# Patient Record
Sex: FEMALE | Race: WHITE | NOT HISPANIC OR LATINO | Employment: OTHER | URBAN - METROPOLITAN AREA
[De-identification: names, ages, dates, MRNs, and addresses within clinical notes are randomized per-mention and may not be internally consistent; named-entity substitution may affect disease eponyms.]

---

## 2017-09-12 ENCOUNTER — OFFICE VISIT (OUTPATIENT)
Dept: CARDIOLOGY | Facility: CLINIC | Age: 82
End: 2017-09-12

## 2017-09-12 VITALS
HEIGHT: 63 IN | DIASTOLIC BLOOD PRESSURE: 70 MMHG | BODY MASS INDEX: 19.7 KG/M2 | SYSTOLIC BLOOD PRESSURE: 130 MMHG | WEIGHT: 111.2 LBS | HEART RATE: 70 BPM

## 2017-09-12 DIAGNOSIS — I10 ESSENTIAL HYPERTENSION: Primary | ICD-10-CM

## 2017-09-12 DIAGNOSIS — R07.9 CHEST PAIN SYNDROME: ICD-10-CM

## 2017-09-12 DIAGNOSIS — I65.23 BILATERAL CAROTID ARTERY STENOSIS: ICD-10-CM

## 2017-09-12 DIAGNOSIS — E78.2 MIXED HYPERLIPIDEMIA: ICD-10-CM

## 2017-09-12 DIAGNOSIS — R94.31 ABNORMAL EKG: ICD-10-CM

## 2017-09-12 PROBLEM — R00.1 BRADYCARDIA ON ECG: Status: ACTIVE | Noted: 2017-09-12

## 2017-09-12 PROCEDURE — 99213 OFFICE O/P EST LOW 20 MIN: CPT | Performed by: NURSE PRACTITIONER

## 2017-09-12 PROCEDURE — 93000 ELECTROCARDIOGRAM COMPLETE: CPT | Performed by: NURSE PRACTITIONER

## 2017-09-12 RX ORDER — DORZOLAMIDE HYDROCHLORIDE AND TIMOLOL MALEATE 20; 5 MG/ML; MG/ML
1 SOLUTION/ DROPS OPHTHALMIC EVERY 12 HOURS
COMMUNITY
Start: 2017-06-09 | End: 2018-06-05 | Stop reason: SDUPTHER

## 2017-09-12 NOTE — ASSESSMENT & PLAN NOTE
· EKG shows ST and T wave abnormality in lateral leads  · Continue aspirin 81 mg daily  · Schedule myocardial perfusion study

## 2017-09-12 NOTE — ASSESSMENT & PLAN NOTE
· Patient currently asymptomatic  · If patient develops symptoms consider decreasing diltiazem to 90 mg daily

## 2017-09-12 NOTE — PROGRESS NOTES
Encounter Date:09/12/2017    Patient ID: Cristiane Harris is a 87 y.o. female who is retired and lives in [place] 6 months out of the year and the other 6 months she resides in [place], [place].    CC/Reason for visit:  Chest pain Syndrome and Hypertension          Cristiane Harris returns today for follow-up for chest pain syndrome, hypertension and hyperlipidemia.  Since her last visit she had a follow-up appointment with her oncologist and they performed a EKG because her heart rate was found to be low with a pulse rate of 48.  The EKG showed her heart rate was 64 but there was some question as to ST changes in the lateral leads and Q waves in the inferior leads and she was instructed to follow-up with her cardiologist.  The patient has not experienced any episodes of chest pain or dyspnea but she does occasionally feel pain in her bilateral jaws as well as a choking sensation in her throat.  This usually occurs only with exertion such as when she is walking.  She is 87 years ago but still lives independently and drives.  She does perform stretching exercises 3 times per week.  She is fairly asymptomatic in regards to her bradycardia and denies weakness, dizziness or increased fatigue.  She did have a heart catheter back in the 1990s that was reportedly normal.  Her last stress test was in 2014 and was negative for ischemia or scar but frequent PACs were noted.  She has known carotid artery disease in his underwent bilateral carotid endarterectomies in the past with Dr. Kendell Willams.  She recently had a repeat carotid ultrasound that showed no hemodynamically significant disease bilaterally.  She denies any signs or symptoms of a TIA or stroke.  She is currently taking statin therapy and tolerating without any reports myalgias.  She reports having blood work with her primary care provider who manages her cholesterol levels.    Review of Systems   HENT:        Throat tightness, jaw discomfort        The  "patient's past medical, social, family history and ROS reviewed in the patient's electronic medical record.    Allergies  Review of patient's allergies indicates no known allergies.    Outpatient Prescriptions Marked as Taking for the 9/12/17 encounter (Office Visit) with EARNEST Alexander   Medication Sig Dispense Refill   • aspirin 81 MG tablet Take  by mouth.     • Calcium Carbonate (CALCIUM 600 PO) Take  by mouth.     • diltiazem CD (CARDIZEM CD) 180 MG 24 hr capsule Take  by mouth daily.     • dorzolamide-timolol (COSOPT) 22.3-6.8 MG/ML ophthalmic solution Daily.     • Glucosamine HCl (GLUCOSAMINE PO) Take  by mouth daily.     • imatinib (GLEEVEC) 400 MG chemo tablet Take 200 mg by mouth daily.     • Multiple Vitamins-Minerals (MULTIVITAMIN PO) Take  by mouth daily.     • omeprazole (PriLOSEC) 20 MG capsule Take 20 mg by mouth Daily.     • pentosan polysulfate (ELMIRON) 100 MG capsule Take  by mouth 2 (two) times a day.     • simvastatin (ZOCOR) 20 MG tablet Take 20 mg by mouth.     • telmisartan (MICARDIS) 40 MG tablet Take 40 mg by mouth Daily.     • TRAMADOL HCL PO Take  by mouth as needed.     • Travoprost (TRAVATAN OP) Apply 1 drop to eye 2 (Two) Times a Day.           Blood pressure 130/70, pulse 70, height 63\" (160 cm), weight 111 lb 3.2 oz (50.4 kg).  Body mass index is 19.7 kg/(m^2).    Physical Exam   Constitutional: She is oriented to person, place, and time. She appears well-developed and well-nourished.   HENT:   Head: Normocephalic and atraumatic.   Eyes: Pupils are equal, round, and reactive to light. No scleral icterus.   Neck: No JVD present. Carotid bruit is not present. No thyromegaly present.   Cardiovascular: Normal rate, regular rhythm, S1 normal and S2 normal.  Exam reveals no gallop.    No murmur heard.  Pulmonary/Chest: Effort normal and breath sounds normal.   Abdominal: Soft. There is no hepatosplenomegaly. There is no tenderness.   Neurological: She is alert and oriented to " person, place, and time.   Skin: Skin is warm and dry. No cyanosis. Nails show no clubbing.   Psychiatric: She has a normal mood and affect. Her behavior is normal.       Data Review:     ECG 12 Lead  Date/Time: 9/12/2017 4:38 PM  Performed by: PREM FERNÁNDEZ  Authorized by: PREM FERNÁNDEZ   Rhythm: sinus bradycardia  BPM: 53  Clinical impression: abnormal ECG  Comments: Sinus bradycardia  ST and T wave abnormality, consider lateral ischemia  MA interval 1:30 MS  QRS duration 98 MS  QT/QTc 432/405 MS                 Problem List Items Addressed This Visit        Cardiovascular and Mediastinum    Essential hypertension - Primary    Current Assessment & Plan     · Hypertension is controlled  · Continue diltiazem 180 mg daily  · Continue Micardis 40 mg daily         Stenosis of carotid artery    Overview     · 2012 Right carotid endarterectomy by Dr Kendell Willams   · 2015 Left carotid endarterectomy by Kendell Lazar  · Bilateral carotid ultrasound 08/28/2017: Right carotid mild luminal irregularities.  Left internal carotid 16-49% stenosis.         Current Assessment & Plan     · Patient asymptomatic  · No significant stenosis noted on recent ultrasound         Mixed hyperlipidemia    Overview     · Follow-up with PCP to have lipids checked  · Titrate statin therapy to achieve LDL less than 100         Current Assessment & Plan     · Continue Zocor 20 mg daily  · Follow-up PCP for routine lipid monitoring            Nervous and Auditory    Chest pain syndrome    Overview     · Cardiac catheterization, 1993, reportedly normal.   · Nuclear stress test at [facility], July 2009 shows no inducible ischemia, normal EF.   · Echocardiogram, 08/18/2014: LVEF (60% to 65%). Impaired LV relaxation. Mild MR. RVSP 29 mmHg.  · Nuclear treadmill exercise stress test, 08/18/2014: Negative for ischemia and scars; LVEF (74%). JUSTIN = -30%. Frequent PACs.         Current Assessment & Plan     · EKG shows ST and T wave  abnormality in lateral leads  · Continue aspirin 81 mg daily  · Schedule myocardial perfusion study         Relevant Orders    Stress Test With Myocardial Perfusion         Mrs. Harris has been experiencing bilateral jaw discomfort along with neck tightness which could be unstable angina.  I will schedule her to undergo a myocardial perfusion study.  She does have bradycardia but appears to be asymptomatic with it.  She developed symptoms we will consider decreasing her diltiazem to 90 mg daily.  She should continue all her current medications.  We will schedule follow-up for 6 months or sooner pending results of her stress test.         · Continue current medications  · Follow-up PCP for titration of statin therapy  · Schedule myocardial perfusion study  · Follow-up 6 months or sooner if needed pending results    Zeny Maza, APRN  9/12/2017

## 2017-10-05 ENCOUNTER — HOSPITAL ENCOUNTER (OUTPATIENT)
Dept: CARDIOLOGY | Facility: HOSPITAL | Age: 82
Discharge: HOME | End: 2017-10-05

## 2017-10-05 ENCOUNTER — HOSPITAL ENCOUNTER (OUTPATIENT)
Dept: CARDIOLOGY | Facility: HOSPITAL | Age: 82
Discharge: HOME | End: 2017-10-05
Admitting: NURSE PRACTITIONER

## 2017-10-05 VITALS
DIASTOLIC BLOOD PRESSURE: 72 MMHG | SYSTOLIC BLOOD PRESSURE: 128 MMHG | WEIGHT: 110 LBS | HEART RATE: 58 BPM | HEIGHT: 63 IN | BODY MASS INDEX: 19.49 KG/M2

## 2017-10-05 DIAGNOSIS — R07.9 CHEST PAIN SYNDROME: ICD-10-CM

## 2017-10-05 LAB
BH CV STRESS BP STAGE 1: NORMAL
BH CV STRESS BP STAGE 2: NORMAL
BH CV STRESS DURATION MIN STAGE 1: 3
BH CV STRESS DURATION MIN STAGE 2: 3
BH CV STRESS DURATION MIN STAGE 3: 1
BH CV STRESS DURATION SEC STAGE 1: 0
BH CV STRESS DURATION SEC STAGE 2: 0
BH CV STRESS DURATION SEC STAGE 3: 37
BH CV STRESS GRADE STAGE 1: 10
BH CV STRESS GRADE STAGE 2: 12
BH CV STRESS GRADE STAGE 3: 14
BH CV STRESS HR STAGE 1: 86
BH CV STRESS HR STAGE 2: 108
BH CV STRESS HR STAGE 3: 117
BH CV STRESS METS STAGE 1: 5
BH CV STRESS METS STAGE 2: 7.5
BH CV STRESS METS STAGE 3: 10
BH CV STRESS O2 STAGE 2: 99
BH CV STRESS PROTOCOL 1: NORMAL
BH CV STRESS RECOVERY BP: NORMAL MMHG
BH CV STRESS RECOVERY HR: 64 BPM
BH CV STRESS SPEED STAGE 1: 1.7
BH CV STRESS SPEED STAGE 2: 2.5
BH CV STRESS SPEED STAGE 3: 3.4
BH CV STRESS STAGE 1: 1
BH CV STRESS STAGE 2: 2
BH CV STRESS STAGE 3: 3
LV EF NUC BP: 75 %
MAXIMAL PREDICTED HEART RATE: 133 BPM
PERCENT MAX PREDICTED HR: 87.97 %
STRESS BASELINE BP: NORMAL MMHG
STRESS BASELINE HR: 59 BPM
STRESS PERCENT HR: 103 %
STRESS POST ESTIMATED WORKLOAD: 7.2 METS
STRESS POST EXERCISE DUR MIN: 7 MIN
STRESS POST EXERCISE DUR SEC: 37 SEC
STRESS POST O2 SAT PEAK: 99 %
STRESS POST PEAK BP: NORMAL MMHG
STRESS POST PEAK HR: 117 BPM
STRESS TARGET HR: 113 BPM

## 2017-10-05 PROCEDURE — 0 TECHNETIUM SESTAMIBI: Performed by: NURSE PRACTITIONER

## 2017-10-05 PROCEDURE — 93017 CV STRESS TEST TRACING ONLY: CPT

## 2017-10-05 PROCEDURE — 93018 CV STRESS TEST I&R ONLY: CPT | Performed by: INTERNAL MEDICINE

## 2017-10-05 PROCEDURE — 78452 HT MUSCLE IMAGE SPECT MULT: CPT

## 2017-10-05 PROCEDURE — 78452 HT MUSCLE IMAGE SPECT MULT: CPT | Performed by: INTERNAL MEDICINE

## 2017-10-05 PROCEDURE — A9500 TC99M SESTAMIBI: HCPCS | Performed by: NURSE PRACTITIONER

## 2017-10-05 RX ADMIN — TECHNETIUM TC-99M SESTAMIBI 1 DOSE: 1 INJECTION INTRAVENOUS at 11:10

## 2017-10-05 RX ADMIN — TECHNETIUM TC-99M SESTAMIBI 1 DOSE: 1 INJECTION INTRAVENOUS at 12:40

## 2018-06-05 ENCOUNTER — OFFICE VISIT (OUTPATIENT)
Dept: CARDIOLOGY | Facility: CLINIC | Age: 83
End: 2018-06-05

## 2018-06-05 VITALS
DIASTOLIC BLOOD PRESSURE: 56 MMHG | HEART RATE: 46 BPM | HEIGHT: 62 IN | SYSTOLIC BLOOD PRESSURE: 136 MMHG | WEIGHT: 109.4 LBS | BODY MASS INDEX: 20.13 KG/M2

## 2018-06-05 DIAGNOSIS — Z01.810 PREOPERATIVE CARDIOVASCULAR EXAMINATION: ICD-10-CM

## 2018-06-05 DIAGNOSIS — I10 ESSENTIAL HYPERTENSION: Primary | ICD-10-CM

## 2018-06-05 DIAGNOSIS — R00.1 BRADYCARDIA ON ECG: ICD-10-CM

## 2018-06-05 DIAGNOSIS — E78.5 HYPERLIPIDEMIA LDL GOAL <100: ICD-10-CM

## 2018-06-05 DIAGNOSIS — I65.23 ATHEROSCLEROSIS OF BOTH CAROTID ARTERIES: ICD-10-CM

## 2018-06-05 PROCEDURE — 93000 ELECTROCARDIOGRAM COMPLETE: CPT | Performed by: INTERNAL MEDICINE

## 2018-06-05 PROCEDURE — 99214 OFFICE O/P EST MOD 30 MIN: CPT | Performed by: INTERNAL MEDICINE

## 2018-06-05 RX ORDER — DORZOLAMIDE HCL 20 MG/ML
1 SOLUTION/ DROPS OPHTHALMIC 2 TIMES DAILY
COMMUNITY
Start: 2018-05-21

## 2018-06-05 RX ORDER — DILTIAZEM HYDROCHLORIDE 120 MG/1
120 CAPSULE, COATED, EXTENDED RELEASE ORAL DAILY
Qty: 90 CAPSULE | Refills: 3 | Status: SHIPPED | OUTPATIENT
Start: 2018-06-05 | End: 2019-05-31

## 2018-06-05 RX ORDER — TRAVOPROST 0.004 %
1 DROPS OPHTHALMIC (EYE) DAILY
COMMUNITY
Start: 2018-05-21

## 2018-06-05 RX ORDER — ESOMEPRAZOLE MAGNESIUM 40 MG/1
40 CAPSULE, DELAYED RELEASE ORAL 2 TIMES DAILY
COMMUNITY
End: 2018-07-07 | Stop reason: HOSPADM

## 2018-06-05 RX ORDER — TIMOLOL MALEATE 5 MG/ML
1 SOLUTION/ DROPS OPHTHALMIC 2 TIMES DAILY
COMMUNITY
Start: 2018-05-21 | End: 2022-09-06

## 2018-06-05 NOTE — ASSESSMENT & PLAN NOTE
The patient is asymptomatic for angina and heart failure symptoms. Her EKG today is normal with the exception of sinus bradycardia, which is been chronic and asymptomatic. She is at acceptable cardiovascular risk to proceed with right total knee arthroplasty.

## 2018-07-02 ENCOUNTER — APPOINTMENT (OUTPATIENT)
Dept: GENERAL RADIOLOGY | Facility: HOSPITAL | Age: 83
End: 2018-07-02

## 2018-07-02 ENCOUNTER — HOSPITAL ENCOUNTER (INPATIENT)
Facility: HOSPITAL | Age: 83
LOS: 5 days | Discharge: HOME HEALTH | End: 2018-07-07
Attending: EMERGENCY MEDICINE | Admitting: INTERNAL MEDICINE

## 2018-07-02 DIAGNOSIS — Z74.09 IMPAIRED MOBILITY AND ADLS: ICD-10-CM

## 2018-07-02 DIAGNOSIS — D64.9 ANEMIA, UNSPECIFIED TYPE: Primary | ICD-10-CM

## 2018-07-02 DIAGNOSIS — R11.2 NAUSEA AND VOMITING, INTRACTABILITY OF VOMITING NOT SPECIFIED, UNSPECIFIED VOMITING TYPE: ICD-10-CM

## 2018-07-02 DIAGNOSIS — K59.39 DILATATION OF COLON: ICD-10-CM

## 2018-07-02 DIAGNOSIS — R19.7 DIARRHEA, UNSPECIFIED TYPE: ICD-10-CM

## 2018-07-02 DIAGNOSIS — K92.2 GASTROINTESTINAL HEMORRHAGE, UNSPECIFIED GASTROINTESTINAL HEMORRHAGE TYPE: ICD-10-CM

## 2018-07-02 DIAGNOSIS — E87.6 HYPOKALEMIA: ICD-10-CM

## 2018-07-02 DIAGNOSIS — Z78.9 IMPAIRED MOBILITY AND ADLS: ICD-10-CM

## 2018-07-02 LAB
ABO GROUP BLD: NORMAL
ALBUMIN SERPL-MCNC: 2.7 G/DL (ref 3.5–5)
ALBUMIN/GLOB SERPL: 1.1 G/DL (ref 1.1–2.5)
ALP SERPL-CCNC: 46 U/L (ref 24–120)
ALT SERPL W P-5'-P-CCNC: 28 U/L (ref 0–54)
ANION GAP SERPL CALCULATED.3IONS-SCNC: 13 MMOL/L (ref 4–13)
AST SERPL-CCNC: 32 U/L (ref 7–45)
BASOPHILS # BLD MANUAL: 0.05 10*3/MM3 (ref 0–0.2)
BASOPHILS NFR BLD AUTO: 1.1 % (ref 0–2)
BILIRUB SERPL-MCNC: 0.2 MG/DL (ref 0.1–1)
BLD GP AB SCN SERPL QL: NEGATIVE
BUN BLD-MCNC: 14 MG/DL (ref 5–21)
BUN/CREAT SERPL: 18.4 (ref 7–25)
CALCIUM SPEC-SCNC: 8.3 MG/DL (ref 8.4–10.4)
CHLORIDE SERPL-SCNC: 104 MMOL/L (ref 98–110)
CO2 SERPL-SCNC: 20 MMOL/L (ref 24–31)
CREAT BLD-MCNC: 0.76 MG/DL (ref 0.5–1.4)
D-LACTATE SERPL-SCNC: 0.9 MMOL/L (ref 0.5–2)
DEPRECATED RDW RBC AUTO: 49 FL (ref 40–54)
DEVELOPER EXPIRATION DATE: ABNORMAL
DEVELOPER LOT NUMBER: 129
ERYTHROCYTE [DISTWIDTH] IN BLOOD BY AUTOMATED COUNT: 14.2 % (ref 12–15)
EXPIRATION DATE: ABNORMAL
FECAL OCCULT BLOOD SCREEN, POC: POSITIVE
GFR SERPL CREATININE-BSD FRML MDRD: 72 ML/MIN/1.73
GIANT PLATELETS: ABNORMAL
GLOBULIN UR ELPH-MCNC: 2.5 GM/DL
GLUCOSE BLD-MCNC: 114 MG/DL (ref 70–100)
HCT VFR BLD AUTO: 22.3 % (ref 37–47)
HGB BLD-MCNC: 7.3 G/DL (ref 12–16)
HYPOCHROMIA BLD QL: ABNORMAL
INR PPP: 1.37 (ref 0.91–1.09)
LIPASE SERPL-CCNC: <10 U/L (ref 23–203)
LYMPHOCYTES # BLD MANUAL: 0.49 10*3/MM3 (ref 0.72–4.86)
LYMPHOCYTES NFR BLD MANUAL: 10.3 % (ref 15–45)
LYMPHOCYTES NFR BLD MANUAL: 8 % (ref 4–12)
Lab: 129
MCH RBC QN AUTO: 31.2 PG (ref 28–32)
MCHC RBC AUTO-ENTMCNC: 32.7 G/DL (ref 33–36)
MCV RBC AUTO: 95.3 FL (ref 82–98)
METAMYELOCYTES NFR BLD MANUAL: 1.1 % (ref 0–0)
MONOCYTES # BLD AUTO: 0.38 10*3/MM3 (ref 0.19–1.3)
MYELOCYTES NFR BLD MANUAL: 2.3 % (ref 0–0)
NEGATIVE CONTROL: NEGATIVE
NEUTROPHILS # BLD AUTO: 3.63 10*3/MM3 (ref 1.87–8.4)
NEUTROPHILS NFR BLD MANUAL: 48.3 % (ref 39–78)
NEUTS BAND NFR BLD MANUAL: 28.7 % (ref 0–10)
PLATELET # BLD AUTO: 328 10*3/MM3 (ref 130–400)
PMV BLD AUTO: 9.6 FL (ref 6–12)
POLYCHROMASIA BLD QL SMEAR: ABNORMAL
POSITIVE CONTROL: POSITIVE
POTASSIUM BLD-SCNC: 2.6 MMOL/L (ref 3.5–5.3)
PROT SERPL-MCNC: 5.2 G/DL (ref 6.3–8.7)
PROTHROMBIN TIME: 17.3 SECONDS (ref 11.9–14.6)
RBC # BLD AUTO: 2.34 10*6/MM3 (ref 4.2–5.4)
RH BLD: NEGATIVE
SODIUM BLD-SCNC: 137 MMOL/L (ref 135–145)
T&S EXPIRATION DATE: NORMAL
WBC MORPH BLD: NORMAL
WBC NRBC COR # BLD: 4.72 10*3/MM3 (ref 4.8–10.8)

## 2018-07-02 PROCEDURE — 85025 COMPLETE CBC W/AUTO DIFF WBC: CPT | Performed by: EMERGENCY MEDICINE

## 2018-07-02 PROCEDURE — 74018 RADEX ABDOMEN 1 VIEW: CPT

## 2018-07-02 PROCEDURE — 25010000002 ONDANSETRON PER 1 MG: Performed by: FAMILY MEDICINE

## 2018-07-02 PROCEDURE — 85007 BL SMEAR W/DIFF WBC COUNT: CPT | Performed by: EMERGENCY MEDICINE

## 2018-07-02 PROCEDURE — 83605 ASSAY OF LACTIC ACID: CPT | Performed by: EMERGENCY MEDICINE

## 2018-07-02 PROCEDURE — 80053 COMPREHEN METABOLIC PANEL: CPT | Performed by: EMERGENCY MEDICINE

## 2018-07-02 PROCEDURE — 25010000003 POTASSIUM CHLORIDE 10 MEQ/100ML SOLUTION: Performed by: EMERGENCY MEDICINE

## 2018-07-02 PROCEDURE — 25010000002 POTASSIUM CHLORIDE PER 2 MEQ: Performed by: FAMILY MEDICINE

## 2018-07-02 PROCEDURE — 87040 BLOOD CULTURE FOR BACTERIA: CPT | Performed by: EMERGENCY MEDICINE

## 2018-07-02 PROCEDURE — 86901 BLOOD TYPING SEROLOGIC RH(D): CPT | Performed by: EMERGENCY MEDICINE

## 2018-07-02 PROCEDURE — 94799 UNLISTED PULMONARY SVC/PX: CPT

## 2018-07-02 PROCEDURE — 86900 BLOOD TYPING SEROLOGIC ABO: CPT | Performed by: EMERGENCY MEDICINE

## 2018-07-02 PROCEDURE — 82270 OCCULT BLOOD FECES: CPT | Performed by: EMERGENCY MEDICINE

## 2018-07-02 PROCEDURE — 83690 ASSAY OF LIPASE: CPT | Performed by: EMERGENCY MEDICINE

## 2018-07-02 PROCEDURE — 85610 PROTHROMBIN TIME: CPT | Performed by: EMERGENCY MEDICINE

## 2018-07-02 PROCEDURE — 99285 EMERGENCY DEPT VISIT HI MDM: CPT

## 2018-07-02 PROCEDURE — 86850 RBC ANTIBODY SCREEN: CPT | Performed by: EMERGENCY MEDICINE

## 2018-07-02 RX ORDER — DILTIAZEM HYDROCHLORIDE 120 MG/1
120 CAPSULE, COATED, EXTENDED RELEASE ORAL DAILY
Status: DISCONTINUED | OUTPATIENT
Start: 2018-07-03 | End: 2018-07-07 | Stop reason: HOSPADM

## 2018-07-02 RX ORDER — ONDANSETRON 2 MG/ML
4 INJECTION INTRAMUSCULAR; INTRAVENOUS EVERY 6 HOURS PRN
Status: DISCONTINUED | OUTPATIENT
Start: 2018-07-02 | End: 2018-07-07 | Stop reason: HOSPADM

## 2018-07-02 RX ORDER — POTASSIUM CHLORIDE 750 MG/1
40 CAPSULE, EXTENDED RELEASE ORAL ONCE
Status: COMPLETED | OUTPATIENT
Start: 2018-07-02 | End: 2018-07-02

## 2018-07-02 RX ORDER — SODIUM CHLORIDE 0.9 % (FLUSH) 0.9 %
1-10 SYRINGE (ML) INJECTION AS NEEDED
Status: DISCONTINUED | OUTPATIENT
Start: 2018-07-02 | End: 2018-07-07 | Stop reason: HOSPADM

## 2018-07-02 RX ORDER — POTASSIUM CHLORIDE 14.9 MG/ML
20 INJECTION INTRAVENOUS ONCE
Status: COMPLETED | OUTPATIENT
Start: 2018-07-02 | End: 2018-07-03

## 2018-07-02 RX ORDER — SODIUM CHLORIDE 9 MG/ML
50 INJECTION, SOLUTION INTRAVENOUS CONTINUOUS
Status: DISCONTINUED | OUTPATIENT
Start: 2018-07-02 | End: 2018-07-04

## 2018-07-02 RX ORDER — POTASSIUM CHLORIDE 7.45 MG/ML
10 INJECTION INTRAVENOUS ONCE
Status: COMPLETED | OUTPATIENT
Start: 2018-07-02 | End: 2018-07-02

## 2018-07-02 RX ORDER — SODIUM CHLORIDE 0.9 % (FLUSH) 0.9 %
10 SYRINGE (ML) INJECTION AS NEEDED
Status: DISCONTINUED | OUTPATIENT
Start: 2018-07-02 | End: 2018-07-07 | Stop reason: HOSPADM

## 2018-07-02 RX ORDER — LOSARTAN POTASSIUM 50 MG/1
50 TABLET ORAL
Status: DISCONTINUED | OUTPATIENT
Start: 2018-07-03 | End: 2018-07-03

## 2018-07-02 RX ORDER — ATORVASTATIN CALCIUM 10 MG/1
10 TABLET, FILM COATED ORAL NIGHTLY
Status: DISCONTINUED | OUTPATIENT
Start: 2018-07-02 | End: 2018-07-07 | Stop reason: HOSPADM

## 2018-07-02 RX ADMIN — POTASSIUM CHLORIDE 10 MEQ: 10 INJECTION, SOLUTION INTRAVENOUS at 18:45

## 2018-07-02 RX ADMIN — SODIUM CHLORIDE 1000 ML: 9 INJECTION, SOLUTION INTRAVENOUS at 18:18

## 2018-07-02 RX ADMIN — ONDANSETRON 4 MG: 2 INJECTION, SOLUTION INTRAMUSCULAR; INTRAVENOUS at 23:35

## 2018-07-02 RX ADMIN — ATORVASTATIN CALCIUM 10 MG: 10 TABLET, FILM COATED ORAL at 22:54

## 2018-07-02 RX ADMIN — POTASSIUM CHLORIDE 20 MEQ: 200 INJECTION, SOLUTION INTRAVENOUS at 22:58

## 2018-07-02 RX ADMIN — POTASSIUM CHLORIDE 40 MEQ: 750 CAPSULE, EXTENDED RELEASE ORAL at 19:47

## 2018-07-02 RX ADMIN — SODIUM CHLORIDE 8 MG/HR: 900 INJECTION INTRAVENOUS at 22:57

## 2018-07-02 RX ADMIN — PENTOSAN POLYSULFATE SODIUM 100 MG: 100 CAPSULE, GELATIN COATED ORAL at 22:54

## 2018-07-02 RX ADMIN — SODIUM CHLORIDE 100 ML/HR: 9 INJECTION, SOLUTION INTRAVENOUS at 21:55

## 2018-07-02 NOTE — ED PROVIDER NOTES
Subjective   Patient is an 88-year-old female who presents with anemia and hypokalemia.  Patient had total knee arthroplasty on June 20 in [place].  Patient has been doing well however is 2 days ago, started having multiple episodes of diarrhea and vomiting.  She notes at least 20 episodes per day of loose stool which has been nonbloody.  No fevers.  She also notes multiple episodes of nonbloody and nonbilious emesis.  Denies any significant abdominal pain.  Denies any fevers.  No previous history of GI bleed.  She has been taking 650 mg of aspirin twice daily since her surgery on June 20.  No other sick family members.  She does have generalized weakness and has been trying Pedialyte at home but does not feel any better.  She had her blood work drawn today and her hemoglobin was 7.4 and potassium was 2.7.  EMS did give her fluids and Zofran with some relief of her nausea.    Family does think she looks slightly more pale than normal but she denies any dizziness, lightheadedness, shortness of breath, syncope, presyncope.  She denies any knee pain.  She has been ambulatory.  No history of potassium problems.            Review of Systems   Constitutional: Negative for chills, diaphoresis, fatigue and fever.   HENT: Negative for sore throat.    Eyes: Negative for visual disturbance.   Respiratory: Negative for shortness of breath.    Cardiovascular: Negative for chest pain.   Gastrointestinal: Positive for diarrhea, nausea and vomiting. Negative for abdominal pain, blood in stool and constipation.   Genitourinary: Negative for dysuria, hematuria and vaginal bleeding.   Musculoskeletal: Negative for back pain.   Skin: Negative for rash.   Neurological: Negative for dizziness, syncope, weakness, light-headedness, numbness and headaches.       Past Medical History:   Diagnosis Date   • Hypertension    • Skin cancer     squamous cell   • Stenosis of carotid artery        No Known Allergies    Past Surgical History:    Procedure Laterality Date   • BREAST SURGERY  2011   • CAROTID ENDARTERECTOMY Left    • OTHER SURGICAL HISTORY      Facial Surgery - Skin cancer removed   • THROMBOENDARTERECTOMY Right 11/20/2012    carotid   • TUMOR REMOVAL      GASTROINTESTINAL STOMA TUMOR       Family History   Problem Relation Age of Onset   • Cancer Mother    • Aneurysm Father         of abdominal aorta   • Coronary artery disease Father    • Diabetes Father    • Cancer Sister        Social History     Social History   • Marital status:      Social History Main Topics   • Smoking status: Never Smoker   • Smokeless tobacco: Never Used   • Alcohol use No   • Drug use: No   • Sexual activity: Defer     Other Topics Concern   • Not on file       Lab Results (last 24 hours)     Procedure Component Value Units Date/Time    CBC & Differential [[id number]] Collected:  07/02/18 1833    Specimen:  Blood Updated:  07/02/18 1921    Narrative:       The following orders were created for panel order CBC & Differential.  Procedure                               Abnormality         Status                     ---------                               -----------         ------                     Manual Differential[[id number]]          Abnormal            Final result               CBC Auto Differential[[id number]]        Abnormal            Final result                 Please view results for these tests on the individual orders.    Comprehensive Metabolic Panel [[id number]]  (Abnormal) Collected:  07/02/18 1833    Specimen:  Blood Updated:  07/02/18 1901     Glucose 114 (H) mg/dL      BUN 14 mg/dL      Creatinine 0.76 mg/dL      Sodium 137 mmol/L      Potassium 2.6 (C) mmol/L      Chloride 104 mmol/L      CO2 20.0 (L) mmol/L      Calcium 8.3 (L) mg/dL      Total Protein 5.2 (L) g/dL      Albumin 2.70 (L) g/dL      ALT (SGPT) 28 U/L      AST (SGOT) 32 U/L      Alkaline Phosphatase 46 U/L      Total Bilirubin 0.2 mg/dL      eGFR Non African Amer 72  mL/min/1.73      Globulin 2.5 gm/dL      A/G Ratio 1.1 g/dL      BUN/Creatinine Ratio 18.4     Anion Gap 13.0 mmol/L     Narrative:       The MDRD GFR formula is only valid for adults with stable renal function between ages 18 and 70.    Protime-INR [[id number]]  (Abnormal) Collected:  07/02/18 1833    Specimen:  Blood Updated:  07/02/18 1856     Protime 17.3 (H) Seconds      INR 1.37 (H)    Lactic Acid, Plasma [[id number]]  (Normal) Collected:  07/02/18 1833    Specimen:  Blood Updated:  07/02/18 1900     Lactate 0.9 mmol/L     Blood Culture - Blood, [[id number]] Collected:  07/02/18 1833    Specimen:  Blood from Arm, Left Updated:  07/02/18 1902    Lipase [[id number]]  (Abnormal) Collected:  07/02/18 1833    Specimen:  Blood Updated:  07/02/18 1859     Lipase <10 (L) U/L     CBC Auto Differential [[id number]]  (Abnormal) Collected:  07/02/18 1833    Specimen:  Blood Updated:  07/02/18 1921     WBC 4.72 (L) 10*3/mm3      RBC 2.34 (L) 10*6/mm3      Hemoglobin 7.3 (L) g/dL      Hematocrit 22.3 (L) %      MCV 95.3 fL      MCH 31.2 pg      MCHC 32.7 (L) g/dL      RDW 14.2 %      RDW-SD 49.0 fl      MPV 9.6 fL      Platelets 328 10*3/mm3     Manual Differential [[id number]]  (Abnormal) Collected:  07/02/18 1833    Specimen:  Blood Updated:  07/02/18 1921     Neutrophil % 48.3 %      Lymphocyte % 10.3 (L) %      Monocyte % 8.0 %      Basophil % 1.1 %      Bands %  28.7 (H) %      Metamyelocyte % 1.1 (H) %      Myelocyte % 2.3 (H) %      Neutrophils Absolute 3.63 10*3/mm3      Lymphocytes Absolute 0.49 (L) 10*3/mm3      Monocytes Absolute 0.38 10*3/mm3      Basophils Absolute 0.05 10*3/mm3      Hypochromia Slight/1+     Polychromasia Slight/1+     WBC Morphology Normal     Giant Platelets Mod/2+          Objective   Physical Exam   Constitutional: She is oriented to person, place, and time. She appears well-nourished. No distress.   Temperature is 100.1   HENT:   Head: Atraumatic.   Mouth/Throat: Oropharynx is clear and moist  "and mucous membranes are normal.   Eyes: Conjunctivae and EOM are normal. Pupils are equal, round, and reactive to light. No scleral icterus.   Neck: Normal range of motion. Neck supple.   Cardiovascular: Normal rate, regular rhythm, normal heart sounds and intact distal pulses.  Exam reveals no gallop and no friction rub.    No murmur heard.  Pulmonary/Chest: Effort normal and breath sounds normal. No respiratory distress.   Abdominal: Soft. She exhibits no distension. There is no tenderness. There is no rebound and no guarding.   Genitourinary: Rectal exam shows external hemorrhoid and guaiac positive stool. Rectal exam shows no internal hemorrhoid, no fissure, no mass, no tenderness and anal tone normal.   Genitourinary Comments: No blood noted on finger.  Brown stool noted.  Hemoccult was very faintly positive   Musculoskeletal: She exhibits no edema, tenderness or deformity.   Patient does have healing incision of her right knee.  No erythema, purulence, tenderness.  Normal range of motion.   Neurological: She is alert and oriented to person, place, and time. No cranial nerve deficit.   Skin: Skin is warm and dry. Capillary refill takes less than 2 seconds. No pallor.   Psychiatric: She has a normal mood and affect.   Nursing note and vitals reviewed.      Procedures         XR Abdomen KUB   Final Result   1. No radiographic evidence of acute abdominopelvic process. Bowel gas   pattern is nonobstructive.   2. Moderate colonic stool with mild dilatation of the ascending colon up   to 6.3 cm.           This report was finalized on 07/02/2018 18:13 by Dr Sandip Fox, .          /59   Pulse 97   Temp 100.1 °F (37.8 °C) (Temporal Artery )   Resp 20   Ht 157.5 cm (62\")   Wt 51.2 kg (112 lb 14.4 oz)   SpO2 100%   BMI 20.65 kg/m²     ED Course    ED Course as of Jul 02 1951 Mon Jul 02, 2018 1915 Potassium: (!!) 2.6 [TH]   1915 CO2: (!) 20.0 [TH]   1915 Calcium: (!) 8.3 [TH]   1915 Albumin: (!) 2.70 " [TH]   1924 Hemoglobin: (!) 7.3 [TH]   1933 This is an 88-year-old female who presents in setting of nausea, vomiting, diarrhea, anemia, hypokalemia.  Recent right knee arthroplasty.  Patient has had multiple episodes of vomiting and diarrhea over 2 days.  Also noted acute anemia with hemoglobin 7.3.  Previous hemoglobin was 9 two years ago.   [TH]   1938 Potassium: (!!) 2.6 [TH]   1938 WBC: (!) 4.72 [TH]   1938 RBC: (!) 2.34 [TH]   1938 Hemoglobin: (!) 7.3 [TH]   1938 Hematocrit: (!) 22.3 [TH]   1938 Her abdominal x-ray does show moderate stool with colonic dilatation up to 6.3 cm.  [TH]   1946 Her rectal exam was faintly positive but no obvious blood on finger.  We have repleted her potassium.  She is currently stable.  We will admit to the hospitalist for further care given lab abnormalities for further hydration, serial H&H, electrolyte replacement, and further workup.  [TH]      ED Course User Index  [TH] Nicko Seth MD       Medications   sodium chloride 0.9 % flush 10 mL (not administered)   potassium chloride 10 mEq in 100 mL IVPB (0 mEq Intravenous Stopped 7/2/18 1922)   sodium chloride 0.9 % bolus 1,000 mL (1,000 mL Intravenous New Bag 7/2/18 1818)   potassium chloride (MICRO-K) CR capsule 40 mEq (40 mEq Oral Given 7/2/18 1947)            MDM  Number of Diagnoses or Management Options  Anemia, unspecified type: new and requires workup  Diarrhea, unspecified type: new and requires workup  Dilatation of colon: new and requires workup  Gastrointestinal hemorrhage, unspecified gastrointestinal hemorrhage type: new and requires workup  Hypokalemia: new and requires workup  Nausea and vomiting, intractability of vomiting not specified, unspecified vomiting type: new and requires workup     Amount and/or Complexity of Data Reviewed  Clinical lab tests: reviewed and ordered  Tests in the radiology section of CPT®: reviewed and ordered  Decide to obtain previous medical records or to obtain history from  someone other than the patient: yes  Review and summarize past medical records: yes    Risk of Complications, Morbidity, and/or Mortality  Presenting problems: moderate  Diagnostic procedures: moderate  Management options: moderate    Patient Progress  Patient progress: stable      Final diagnoses:   Anemia, unspecified type   Gastrointestinal hemorrhage, unspecified gastrointestinal hemorrhage type   Hypokalemia   Diarrhea, unspecified type   Nausea and vomiting, intractability of vomiting not specified, unspecified vomiting type   Dilatation of colon          Nicko Seth MD  07/02/18 1951

## 2018-07-03 ENCOUNTER — ANESTHESIA (OUTPATIENT)
Dept: GASTROENTEROLOGY | Facility: HOSPITAL | Age: 83
End: 2018-07-03

## 2018-07-03 ENCOUNTER — ANESTHESIA EVENT (OUTPATIENT)
Dept: GASTROENTEROLOGY | Facility: HOSPITAL | Age: 83
End: 2018-07-03

## 2018-07-03 ENCOUNTER — APPOINTMENT (OUTPATIENT)
Dept: GENERAL RADIOLOGY | Facility: HOSPITAL | Age: 83
End: 2018-07-03

## 2018-07-03 PROBLEM — R11.2 NAUSEA AND VOMITING: Status: ACTIVE | Noted: 2018-07-02

## 2018-07-03 LAB
ADV 40+41 DNA STL QL NAA+NON-PROBE: NOT DETECTED
ALBUMIN SERPL-MCNC: 2.7 G/DL (ref 3.5–5)
ALBUMIN/GLOB SERPL: 1.1 G/DL (ref 1.1–2.5)
ALP SERPL-CCNC: 44 U/L (ref 24–120)
ALT SERPL W P-5'-P-CCNC: 32 U/L (ref 0–54)
ANION GAP SERPL CALCULATED.3IONS-SCNC: 10 MMOL/L (ref 4–13)
ANISOCYTOSIS BLD QL: ABNORMAL
AST SERPL-CCNC: 35 U/L (ref 7–45)
ASTRO TYP 1-8 RNA STL QL NAA+NON-PROBE: NOT DETECTED
BACTERIA UR QL AUTO: ABNORMAL /HPF
BILIRUB SERPL-MCNC: 0.2 MG/DL (ref 0.1–1)
BILIRUB UR QL STRIP: NEGATIVE
BUN BLD-MCNC: 14 MG/DL (ref 5–21)
BUN/CREAT SERPL: 18.4 (ref 7–25)
C CAYETANENSIS DNA STL QL NAA+NON-PROBE: NOT DETECTED
C DIFF TOX GENS STL QL NAA+PROBE: DETECTED
CALCIUM SPEC-SCNC: 8 MG/DL (ref 8.4–10.4)
CAMPY SP DNA.DIARRHEA STL QL NAA+PROBE: NOT DETECTED
CHLORIDE SERPL-SCNC: 106 MMOL/L (ref 98–110)
CLARITY UR: CLEAR
CO2 SERPL-SCNC: 20 MMOL/L (ref 24–31)
COLOR UR: YELLOW
CREAT BLD-MCNC: 0.76 MG/DL (ref 0.5–1.4)
CRYPTOSP STL CULT: NOT DETECTED
DEPRECATED RDW RBC AUTO: 49.9 FL (ref 40–54)
E COLI DNA SPEC QL NAA+PROBE: NOT DETECTED
E HISTOLYT AG STL-ACNC: NOT DETECTED
EAEC PAA PLAS AGGR+AATA ST NAA+NON-PRB: NOT DETECTED
EC STX1 + STX2 GENES STL NAA+PROBE: NOT DETECTED
EPEC EAE GENE STL QL NAA+NON-PROBE: DETECTED
ERYTHROCYTE [DISTWIDTH] IN BLOOD BY AUTOMATED COUNT: 14.3 % (ref 12–15)
ETEC LTA+ST1A+ST1B TOX ST NAA+NON-PROBE: NOT DETECTED
FERRITIN SERPL-MCNC: 47.9 NG/ML (ref 11.1–264)
FOLATE SERPL-MCNC: >20 NG/ML
G LAMBLIA DNA SPEC QL NAA+PROBE: NOT DETECTED
GFR SERPL CREATININE-BSD FRML MDRD: 72 ML/MIN/1.73
GLOBULIN UR ELPH-MCNC: 2.4 GM/DL
GLUCOSE BLD-MCNC: 110 MG/DL (ref 70–100)
GLUCOSE UR STRIP-MCNC: NEGATIVE MG/DL
HCT VFR BLD AUTO: 21.3 % (ref 37–47)
HCT VFR BLD AUTO: 22 % (ref 37–47)
HCT VFR BLD AUTO: 22.2 % (ref 37–47)
HCT VFR BLD AUTO: 22.5 % (ref 37–47)
HGB BLD-MCNC: 7 G/DL (ref 12–16)
HGB BLD-MCNC: 7.1 G/DL (ref 12–16)
HGB UR QL STRIP.AUTO: NEGATIVE
HYALINE CASTS UR QL AUTO: ABNORMAL /LPF
IRON 24H UR-MRATE: <10 MCG/DL (ref 42–180)
IRON SATN MFR SERPL: ABNORMAL % (ref 20–45)
KETONES UR QL STRIP: ABNORMAL
LEUKOCYTE ESTERASE UR QL STRIP.AUTO: ABNORMAL
LYMPHOCYTES # BLD MANUAL: 0.74 10*3/MM3 (ref 0.72–4.86)
LYMPHOCYTES NFR BLD MANUAL: 18 % (ref 15–45)
LYMPHOCYTES NFR BLD MANUAL: 7 % (ref 4–12)
MAGNESIUM SERPL-MCNC: 1.7 MG/DL (ref 1.4–2.2)
MCH RBC QN AUTO: 32.1 PG (ref 28–32)
MCHC RBC AUTO-ENTMCNC: 33.3 G/DL (ref 33–36)
MCV RBC AUTO: 96.4 FL (ref 82–98)
MONOCYTES # BLD AUTO: 0.29 10*3/MM3 (ref 0.19–1.3)
NEUTROPHILS # BLD AUTO: 3.08 10*3/MM3 (ref 1.87–8.4)
NEUTROPHILS NFR BLD MANUAL: 49 % (ref 39–78)
NEUTS BAND NFR BLD MANUAL: 26 % (ref 0–10)
NITRITE UR QL STRIP: NEGATIVE
NOROVIRUS GI+II RNA STL QL NAA+NON-PROBE: NOT DETECTED
P SHIGELLOIDES DNA STL QL NAA+NON-PROBE: NOT DETECTED
PH UR STRIP.AUTO: <=5 [PH] (ref 5–8)
PHOSPHATE SERPL-MCNC: 2.5 MG/DL (ref 2.5–4.5)
PLAT MORPH BLD: NORMAL
PLATELET # BLD AUTO: 308 10*3/MM3 (ref 130–400)
PMV BLD AUTO: 9.7 FL (ref 6–12)
POIKILOCYTOSIS BLD QL SMEAR: ABNORMAL
POTASSIUM BLD-SCNC: 3.4 MMOL/L (ref 3.5–5.3)
PROT SERPL-MCNC: 5.1 G/DL (ref 6.3–8.7)
PROT UR QL STRIP: ABNORMAL
RBC # BLD AUTO: 2.21 10*6/MM3 (ref 4.2–5.4)
RBC # UR: ABNORMAL /HPF
REF LAB TEST METHOD: ABNORMAL
RV RNA STL NAA+PROBE: NOT DETECTED
SALMONELLA DNA SPEC QL NAA+PROBE: DETECTED
SAPO I+II+IV+V RNA STL QL NAA+NON-PROBE: NOT DETECTED
SHIGELLA SP+EIEC IPAH STL QL NAA+PROBE: NOT DETECTED
SODIUM BLD-SCNC: 136 MMOL/L (ref 135–145)
SP GR UR STRIP: 1.02 (ref 1–1.03)
SQUAMOUS #/AREA URNS HPF: ABNORMAL /HPF
TIBC SERPL-MCNC: 293 MCG/DL (ref 225–420)
UROBILINOGEN UR QL STRIP: ABNORMAL
V CHOLERAE DNA SPEC QL NAA+PROBE: NOT DETECTED
VIBRIO DNA SPEC NAA+PROBE: NOT DETECTED
VIT B12 BLD-MCNC: 995 PG/ML (ref 239–931)
WBC MORPH BLD: NORMAL
WBC NRBC COR # BLD: 4.11 10*3/MM3 (ref 4.8–10.8)
WBC UR QL AUTO: ABNORMAL /HPF
YERSINIA STL CULT: NOT DETECTED

## 2018-07-03 PROCEDURE — 93010 ELECTROCARDIOGRAM REPORT: CPT | Performed by: INTERNAL MEDICINE

## 2018-07-03 PROCEDURE — 85014 HEMATOCRIT: CPT | Performed by: NURSE PRACTITIONER

## 2018-07-03 PROCEDURE — 82728 ASSAY OF FERRITIN: CPT | Performed by: NURSE PRACTITIONER

## 2018-07-03 PROCEDURE — 87507 IADNA-DNA/RNA PROBE TQ 12-25: CPT | Performed by: NURSE PRACTITIONER

## 2018-07-03 PROCEDURE — G8989 SELF CARE D/C STATUS: HCPCS

## 2018-07-03 PROCEDURE — 82607 VITAMIN B-12: CPT | Performed by: NURSE PRACTITIONER

## 2018-07-03 PROCEDURE — 43239 EGD BIOPSY SINGLE/MULTIPLE: CPT | Performed by: INTERNAL MEDICINE

## 2018-07-03 PROCEDURE — 81001 URINALYSIS AUTO W/SCOPE: CPT | Performed by: FAMILY MEDICINE

## 2018-07-03 PROCEDURE — 85025 COMPLETE CBC W/AUTO DIFF WBC: CPT | Performed by: FAMILY MEDICINE

## 2018-07-03 PROCEDURE — 25010000002 PROPOFOL 10 MG/ML EMULSION: Performed by: NURSE ANESTHETIST, CERTIFIED REGISTERED

## 2018-07-03 PROCEDURE — 82746 ASSAY OF FOLIC ACID SERUM: CPT | Performed by: NURSE PRACTITIONER

## 2018-07-03 PROCEDURE — 71045 X-RAY EXAM CHEST 1 VIEW: CPT

## 2018-07-03 PROCEDURE — 85014 HEMATOCRIT: CPT | Performed by: FAMILY MEDICINE

## 2018-07-03 PROCEDURE — 87081 CULTURE SCREEN ONLY: CPT | Performed by: NURSE PRACTITIONER

## 2018-07-03 PROCEDURE — 0W3P8ZZ CONTROL BLEEDING IN GASTROINTESTINAL TRACT, VIA NATURAL OR ARTIFICIAL OPENING ENDOSCOPIC: ICD-10-PCS | Performed by: INTERNAL MEDICINE

## 2018-07-03 PROCEDURE — 97165 OT EVAL LOW COMPLEX 30 MIN: CPT

## 2018-07-03 PROCEDURE — 99222 1ST HOSP IP/OBS MODERATE 55: CPT | Performed by: INTERNAL MEDICINE

## 2018-07-03 PROCEDURE — 85018 HEMOGLOBIN: CPT | Performed by: FAMILY MEDICINE

## 2018-07-03 PROCEDURE — 85018 HEMOGLOBIN: CPT | Performed by: NURSE PRACTITIONER

## 2018-07-03 PROCEDURE — 80053 COMPREHEN METABOLIC PANEL: CPT | Performed by: FAMILY MEDICINE

## 2018-07-03 PROCEDURE — 88305 TISSUE EXAM BY PATHOLOGIST: CPT | Performed by: INTERNAL MEDICINE

## 2018-07-03 PROCEDURE — 83735 ASSAY OF MAGNESIUM: CPT | Performed by: FAMILY MEDICINE

## 2018-07-03 PROCEDURE — 83550 IRON BINDING TEST: CPT | Performed by: NURSE PRACTITIONER

## 2018-07-03 PROCEDURE — 94799 UNLISTED PULMONARY SVC/PX: CPT

## 2018-07-03 PROCEDURE — G8987 SELF CARE CURRENT STATUS: HCPCS

## 2018-07-03 PROCEDURE — 25010000002 POTASSIUM CHLORIDE PER 2 MEQ: Performed by: NURSE PRACTITIONER

## 2018-07-03 PROCEDURE — 93005 ELECTROCARDIOGRAM TRACING: CPT | Performed by: FAMILY MEDICINE

## 2018-07-03 PROCEDURE — 87077 CULTURE AEROBIC IDENTIFY: CPT | Performed by: NURSE PRACTITIONER

## 2018-07-03 PROCEDURE — 25010000002 IRON SUCROSE PER 1 MG: Performed by: NURSE PRACTITIONER

## 2018-07-03 PROCEDURE — 0DB68ZX EXCISION OF STOMACH, VIA NATURAL OR ARTIFICIAL OPENING ENDOSCOPIC, DIAGNOSTIC: ICD-10-PCS | Performed by: INTERNAL MEDICINE

## 2018-07-03 PROCEDURE — G8988 SELF CARE GOAL STATUS: HCPCS

## 2018-07-03 PROCEDURE — 25010000002 PHENYLEPHRINE PER 1 ML: Performed by: NURSE ANESTHETIST, CERTIFIED REGISTERED

## 2018-07-03 PROCEDURE — 84100 ASSAY OF PHOSPHORUS: CPT | Performed by: FAMILY MEDICINE

## 2018-07-03 PROCEDURE — 85007 BL SMEAR W/DIFF WBC COUNT: CPT | Performed by: FAMILY MEDICINE

## 2018-07-03 PROCEDURE — 83540 ASSAY OF IRON: CPT | Performed by: NURSE PRACTITIONER

## 2018-07-03 RX ORDER — LIDOCAINE HYDROCHLORIDE 20 MG/ML
INJECTION, SOLUTION INFILTRATION; PERINEURAL AS NEEDED
Status: DISCONTINUED | OUTPATIENT
Start: 2018-07-03 | End: 2018-07-03 | Stop reason: SURG

## 2018-07-03 RX ORDER — TIMOLOL MALEATE 5 MG/ML
1 SOLUTION/ DROPS OPHTHALMIC EVERY 12 HOURS SCHEDULED
Status: DISCONTINUED | OUTPATIENT
Start: 2018-07-03 | End: 2018-07-07 | Stop reason: HOSPADM

## 2018-07-03 RX ORDER — PANTOPRAZOLE SODIUM 40 MG/1
40 TABLET, DELAYED RELEASE ORAL
Status: DISCONTINUED | OUTPATIENT
Start: 2018-07-04 | End: 2018-07-07 | Stop reason: HOSPADM

## 2018-07-03 RX ORDER — DORZOLAMIDE HCL 20 MG/ML
1 SOLUTION/ DROPS OPHTHALMIC 2 TIMES DAILY
Status: DISCONTINUED | OUTPATIENT
Start: 2018-07-03 | End: 2018-07-07 | Stop reason: HOSPADM

## 2018-07-03 RX ORDER — SODIUM CHLORIDE 9 MG/ML
100 INJECTION, SOLUTION INTRAVENOUS CONTINUOUS
Status: DISCONTINUED | OUTPATIENT
Start: 2018-07-03 | End: 2018-07-03 | Stop reason: HOSPADM

## 2018-07-03 RX ORDER — SODIUM CHLORIDE 0.9 % (FLUSH) 0.9 %
1-10 SYRINGE (ML) INJECTION AS NEEDED
Status: DISCONTINUED | OUTPATIENT
Start: 2018-07-03 | End: 2018-07-03 | Stop reason: HOSPADM

## 2018-07-03 RX ORDER — PROPOFOL 10 MG/ML
VIAL (ML) INTRAVENOUS AS NEEDED
Status: DISCONTINUED | OUTPATIENT
Start: 2018-07-03 | End: 2018-07-03 | Stop reason: SURG

## 2018-07-03 RX ORDER — LATANOPROST 50 UG/ML
1 SOLUTION/ DROPS OPHTHALMIC NIGHTLY
Status: DISCONTINUED | OUTPATIENT
Start: 2018-07-03 | End: 2018-07-07 | Stop reason: HOSPADM

## 2018-07-03 RX ORDER — POTASSIUM CHLORIDE 14.9 MG/ML
20 INJECTION INTRAVENOUS ONCE
Status: COMPLETED | OUTPATIENT
Start: 2018-07-03 | End: 2018-07-03

## 2018-07-03 RX ADMIN — POTASSIUM CHLORIDE 20 MEQ: 200 INJECTION, SOLUTION INTRAVENOUS at 08:33

## 2018-07-03 RX ADMIN — DILTIAZEM HYDROCHLORIDE 120 MG: 120 CAPSULE, COATED, EXTENDED RELEASE ORAL at 08:34

## 2018-07-03 RX ADMIN — ATORVASTATIN CALCIUM 10 MG: 10 TABLET, FILM COATED ORAL at 20:43

## 2018-07-03 RX ADMIN — SODIUM CHLORIDE 8 MG/HR: 900 INJECTION INTRAVENOUS at 08:33

## 2018-07-03 RX ADMIN — SODIUM CHLORIDE 50 ML/HR: 9 INJECTION, SOLUTION INTRAVENOUS at 13:54

## 2018-07-03 RX ADMIN — IRON SUCROSE 300 MG: 20 INJECTION, SOLUTION INTRAVENOUS at 13:36

## 2018-07-03 RX ADMIN — VANCOMYCIN HYDROCHLORIDE 125 MG: KIT at 15:03

## 2018-07-03 RX ADMIN — LOSARTAN POTASSIUM 50 MG: 50 TABLET, FILM COATED ORAL at 08:34

## 2018-07-03 RX ADMIN — DORZOLAMIDE HYDROCHLORIDE 1 DROP: 20 SOLUTION OPHTHALMIC at 20:43

## 2018-07-03 RX ADMIN — PHENYLEPHRINE HYDROCHLORIDE 160 MCG: 10 INJECTION INTRAVENOUS at 12:31

## 2018-07-03 RX ADMIN — PROPOFOL 50 MG: 10 INJECTION, EMULSION INTRAVENOUS at 12:26

## 2018-07-03 RX ADMIN — LATANOPROST 1 DROP: 50 SOLUTION OPHTHALMIC at 20:44

## 2018-07-03 RX ADMIN — TIMOLOL MALEATE 1 DROP: 5 SOLUTION/ DROPS OPHTHALMIC at 20:43

## 2018-07-03 RX ADMIN — SODIUM CHLORIDE 8 MG/HR: 900 INJECTION INTRAVENOUS at 03:10

## 2018-07-03 RX ADMIN — TIMOLOL MALEATE 1 DROP: 5 SOLUTION/ DROPS OPHTHALMIC at 13:36

## 2018-07-03 RX ADMIN — PENTOSAN POLYSULFATE SODIUM 100 MG: 100 CAPSULE, GELATIN COATED ORAL at 16:48

## 2018-07-03 RX ADMIN — VANCOMYCIN HYDROCHLORIDE 250 MG: KIT at 20:43

## 2018-07-03 RX ADMIN — DORZOLAMIDE HYDROCHLORIDE 1 DROP: 20 SOLUTION OPHTHALMIC at 08:35

## 2018-07-03 RX ADMIN — PENTOSAN POLYSULFATE SODIUM 100 MG: 100 CAPSULE, GELATIN COATED ORAL at 06:41

## 2018-07-03 RX ADMIN — LIDOCAINE HYDROCHLORIDE 50 MG: 20 INJECTION, SOLUTION INFILTRATION; PERINEURAL at 12:26

## 2018-07-03 NOTE — H&P (VIEW-ONLY)
[facility] Gastroenterology  Inpatient Consult Note  Today's date:  07/03/18    Cristiane Harris  11/13/1929       Referring Provider: Mayela Hartmann MD  Primary Physician: Julius Daniel MD   Primary Gastroenterologist: Unknown    Date of Admission: 7/2/2018  Date of Service:  07/03/18    Reason for Consultation/Chief Complaint: Diarrhea/anemia    History of present illness: This is a very pleasant 88-year-old female who tells me today that on Saturday she began to have diarrhea.  She states that this continued all day and into Sunday until yesterday she came to the hospital.  She states that she really had no abdominal pain with this.  She states that she had 102 fever at home and her son is at bedside in agrees with this.  The patient and her son state that she was having approximately 25 stools a day.  She tells me that today it has decreased some.  Of note the patient tells me that she had a UTI in June and was treated with this with an antibiotic however she does not remember the name of it.  She states that she took this for about 7 days and then she underwent knee surgery in [place] where her son states that she was given IV antibiotics at that time as well.  She states she was given 650 mg of aspirin twice a day after surgery and has continued to take it since that time.  She tells me that she has had a history of chronic anemia.    The patient denies any nausea, vomiting, epigastric pain, dysphagia, pyrosis or hematemesis.  The patient denies any fever or chills.  Denies any melena or hematochezia.  Denies any unintentional weight loss or loss of appetite.    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  According to records from [facility] in care everywhere the patient had a colonoscopy in 2011 and 2012.  The patient tells me that she has had a history of colon polyps.  She also tells me that her father had colon cancer greater than  60 years old.  The patient's last EGD was performed on 7/25/14 for chronic anemia.  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  The patient underwent Whipple per Dr. Stewart Merchant in [facility]. The patient tells me that she was told this was nonmalignant however she states that she has been treated with Gleevac since that time.    Labs and imaging: CMP on admission revealed hyperglycemia, hypokalemia and hypocalcemia, total protein 5.2, 2.70.  INR 1.37.  CBC reveals a hemoglobin of 7.3.  Hemoglobin today 7.1.  Stools positive for occult blood.  Iron profile reveals iron of less than 10, ferritin 47.9, total iron-binding capacity 293.  Percent saturation lab unable to calculate.  KUB revealed no acute abdominal pelvic process.  Moderate colonic stool with mild dilation of ascending colon up to 6.3 cm.    Past Medical History:   Diagnosis Date   • Anemia    • Arthritis    • GERD (gastroesophageal reflux disease)    • GIST (gastrointestinal stromal tumor), non-malignant    • Hyperlipemia    • Hypertension    • Skin cancer     squamous cell   • Stenosis of carotid artery        Past Surgical History:   Procedure Laterality Date   • BREAST SURGERY  2011   • CAROTID ENDARTERECTOMY Left    • CATARACT EXTRACTION W/ INTRAOCULAR LENS IMPLANT     • JOINT REPLACEMENT     • OTHER SURGICAL HISTORY      Facial Surgery - Skin cancer removed   • THROMBOENDARTERECTOMY Right 11/20/2012    carotid   • TOTAL KNEE ARTHROPLASTY Right    • TUMOR REMOVAL      GASTROINTESTINAL STOMA TUMOR   • WHIPPLE PROCEDURE          No Known Allergies    Prescriptions Prior to Admission   Medication Sig Dispense Refill Last Dose   • aspirin 81 MG tablet Take 81 mg by mouth Daily.   Taking   • diltiaZEM CD (CARDIZEM CD) 120 MG 24 hr capsule Take 1 capsule by mouth Daily for 360 days. 90 capsule 3    • dorzolamide (TRUSOPT) 2 % ophthalmic solution Administer  1 drop to both eyes 2 (Two) Times a Day.   Taking   • esomeprazole (nexIUM) 40 MG capsule Take 40 mg by mouth 2 (Two) Times a Day.   Taking   • Glucosamine HCl (GLUCOSAMINE PO) Take 1 tablet by mouth Daily.   Taking   • imatinib (GLEEVEC) 400 MG chemo tablet Take 200 mg by mouth daily.   Taking   • Multiple Vitamins-Minerals (MULTIVITAMIN PO) Take 1 tablet by mouth Daily.   Taking   • omeprazole (PriLOSEC) 20 MG capsule Take 20 mg by mouth Daily.   Taking   • pentosan polysulfate (ELMIRON) 100 MG capsule Take  by mouth 2 (two) times a day.   Taking   • simvastatin (ZOCOR) 20 MG tablet Take 20 mg by mouth Every Night.   Taking   • telmisartan (MICARDIS) 40 MG tablet Take 40 mg by mouth Daily.   Taking   • timolol (TIMOPTIC) 0.5 % ophthalmic solution Administer 1 drop to both eyes 2 (Two) Times a Day.   Taking   • TRAMADOL HCL PO Take 1 tablet by mouth As Needed.   Taking   • TRAVATAN Z 0.004 % solution ophthalmic solution Administer 1 drop to both eyes Daily.   Taking       Hospital Medications (active)       Dose Frequency Start End    atorvastatin (LIPITOR) tablet 10 mg 10 mg Nightly 7/2/2018     Sig - Route: Take 1 tablet by mouth Every Night. - Oral    diltiaZEM CD (CARDIZEM CD) 24 hr capsule 120 mg 120 mg Daily 7/3/2018 6/1/2019    Sig - Route: Take 1 capsule by mouth Daily. - Oral    dorzolamide (TRUSOPT) 2 % ophthalmic solution 1 drop 1 drop 2 Times Daily 7/3/2018     Sig - Route: Administer 1 drop to both eyes 2 (Two) Times a Day. - Both Eyes    iron sucrose (VENOFER) 300 mg in sodium chloride 0.9 % 100 mL IVPB 300 mg Every 24 Hours 7/3/2018 7/6/2018    Sig - Route: Infuse 300 mg into a venous catheter Daily. - Intravenous    latanoprost (XALATAN) 0.005 % ophthalmic solution 1 drop 1 drop Nightly 7/3/2018     Sig - Route: Administer 1 drop to both eyes Every Night. - Both Eyes    ondansetron (ZOFRAN) injection 4 mg 4 mg Every 6 Hours PRN 7/2/2018     Sig - Route: Infuse 2 mL into a venous catheter Every 6  "(Six) Hours As Needed for Nausea or Vomiting. - Intravenous    pantoprazole (PROTONIX) 40 mg/100 mL (0.4 mg/mL) in 0.9% NS IVPB 8 mg/hr Continuous 7/2/2018     Sig - Route: Infuse 8 mg/hr into a venous catheter Continuous. - Intravenous    pentosan polysulfate (ELMIRON) capsule 100 mg 100 mg 2 Times Daily Before Meals 7/2/2018     Sig - Route: Take 1 capsule by mouth 2 (Two) Times a Day Before Meals. - Oral    potassium chloride (MICRO-K) CR capsule 40 mEq 40 mEq Once 7/2/2018 7/2/2018    Sig - Route: Take 4 capsules by mouth 1 (One) Time. - Oral    potassium chloride 10 mEq in 100 mL IVPB 10 mEq Once 7/2/2018 7/2/2018    Sig - Route: Infuse 100 mL into a venous catheter 1 (One) Time. - Intravenous    potassium chloride 20 mEq in 100 mL IVPB 20 mEq Once 7/2/2018 7/3/2018    Sig - Route: Infuse 100 mL into a venous catheter 1 (One) Time. - Intravenous    potassium chloride 20 mEq in 100 mL IVPB 20 mEq Once 7/3/2018 7/3/2018    Sig - Route: Infuse 100 mL into a venous catheter 1 (One) Time. - Intravenous    sodium chloride 0.9 % bolus 1,000 mL 1,000 mL Once 7/2/2018 7/2/2018    Sig - Route: Infuse 1,000 mL into a venous catheter 1 (One) Time. - Intravenous    sodium chloride 0.9 % flush 1-10 mL 1-10 mL As Needed 7/2/2018     Sig - Route: Infuse 1-10 mL into a venous catheter As Needed for Line Care. - Intravenous    sodium chloride 0.9 % flush 10 mL 10 mL As Needed 7/2/2018     Sig - Route: Infuse 10 mL into a venous catheter As Needed for Line Care. - Intravenous    Linked Group 1:  \"And\" Linked Group Details        sodium chloride 0.9 % infusion 50 mL/hr Continuous 7/2/2018     Sig - Route: Infuse 50 mL/hr into a venous catheter Continuous. - Intravenous    timolol (TIMOPTIC) 0.5 % ophthalmic solution 1 drop 1 drop Every 12 Hours Scheduled 7/3/2018     Sig - Route: Administer 1 drop to both eyes Every 12 (Twelve) Hours. - Both Eyes    losartan (COZAAR) tablet 50 mg (Discontinued) 50 mg Every 24 Hours Scheduled " 7/3/2018 7/3/2018    Sig - Route: Take 1 tablet by mouth Daily. - Oral          Social History   Substance Use Topics   • Smoking status: Never Smoker   • Smokeless tobacco: Never Used   • Alcohol use No        Past Family History:  Family History   Problem Relation Age of Onset   • Cancer Mother    • Aneurysm Father         of abdominal aorta   • Coronary artery disease Father    • Diabetes Father    • Cancer Sister        Review of Systems:  Review of Systems   Constitutional: Negative for fever and unexpected weight change.   HENT: Negative for hearing loss.    Eyes: Negative for visual disturbance.   Respiratory: Negative for cough.    Cardiovascular: Negative for chest pain.   Gastrointestinal:        See HPI   Endocrine: Negative for cold intolerance and heat intolerance.   Genitourinary: Negative for dysuria.   Musculoskeletal: Negative for arthralgias.   Skin: Negative for rash.   Neurological: Negative for seizures.   Psychiatric/Behavioral: Negative for hallucinations.       Physical Exam:  Temp:  [98.1 °F (36.7 °C)-100.1 °F (37.8 °C)] 98.4 °F (36.9 °C)  Heart Rate:  [] 81  Resp:  [14-22] 18  BP: ()/() 135/57  Body mass index is 17.89 kg/m².    Intake/Output Summary (Last 24 hours) at 07/03/18 1041  Last data filed at 07/03/18 0751   Gross per 24 hour   Intake          1277.17 ml   Output             1825 ml   Net          -547.83 ml     I/O this shift:  In: 435 [I.V.:363; IV Piggyback:72]  Out: -   Physical Exam   Constitutional: She is oriented to person, place, and time. She appears well-developed and well-nourished.   HENT:   Mouth/Throat: Oropharynx is clear and moist.   Eyes: EOM are normal.   Cardiovascular: Normal rate, regular rhythm and normal heart sounds.  Exam reveals no gallop and no friction rub.    No murmur heard.  Pulmonary/Chest: Effort normal and breath sounds normal. She has no wheezes. She has no rales.   Abdominal: Soft. Bowel sounds are normal. She exhibits no  distension. There is no hepatosplenomegaly. There is no tenderness. There is no rigidity, no rebound and no guarding.   Musculoskeletal: Normal range of motion. She exhibits no edema, tenderness or deformity.   Neurological: She is alert and oriented to person, place, and time.   Skin: Skin is warm and dry. No rash noted.   Psychiatric: She has a normal mood and affect. Her behavior is normal. Judgment and thought content normal.   Vitals reviewed.    I have performed the physical exam and agree with the findings as noted above.   RAMON Dozier MD    Results Review:  Lab Results (last 24 hours)     Procedure Component Value Units Date/Time    Gastrointestinal Panel, PCR - Stool, Per Rectum [[id number]] Collected:  07/03/18 1006    Specimen:  Stool from Per Rectum Updated:  07/03/18 1032    Vitamin B12 [[id number]]  (Abnormal) Collected:  07/03/18 0819    Specimen:  Blood Updated:  07/03/18 1004     Vitamin B-12 995 (H) pg/mL     Folate [[id number]] Collected:  07/03/18 0819    Specimen:  Blood Updated:  07/03/18 1004     Folate >20.00 ng/mL     Ferritin [[id number]]  (Normal) Collected:  07/03/18 0819    Specimen:  Blood Updated:  07/03/18 0934     Ferritin 47.90 ng/mL     Iron Profile [[id number]]  (Abnormal) Collected:  07/03/18 0819    Specimen:  Blood Updated:  07/03/18 0923     Iron <10 (L) mcg/dL      TIBC 293 mcg/dL      Iron Saturation -- %      Comment: Unable to calculate.       Blood Culture - Blood, [[id number]]  (Normal) Collected:  07/02/18 2000    Specimen:  Blood from Arm, Left Updated:  07/03/18 0915     Blood Culture No growth at less than 24 hours    Blood Culture - Blood, [[id number]]  (Normal) Collected:  07/02/18 1833    Specimen:  Blood from Arm, Left Updated:  07/03/18 0715     Blood Culture No growth at less than 24 hours    Urinalysis With Culture If Indicated - Urine, Clean Catch [[id number]]  (Abnormal) Collected:  07/03/18 0643    Specimen:  Urine from Urine, Clean Catch Updated:  07/03/18 0712      Color, UA Yellow     Appearance, UA Clear     pH, UA <=5.0     Specific Gravity, UA 1.018     Glucose, UA Negative     Ketones, UA Trace (A)     Bilirubin, UA Negative     Blood, UA Negative     Protein, UA Trace (A)     Leuk Esterase, UA Small (1+) (A)     Nitrite, UA Negative     Urobilinogen, UA 0.2 E.U./dL    Urinalysis, Microscopic Only - Urine, Clean Catch [[id number]]  (Abnormal) Collected:  07/03/18 0643    Specimen:  Urine from Urine, Clean Catch Updated:  07/03/18 0712     RBC, UA 6-12 (A) /HPF      WBC, UA 3-5 (A) /HPF      Bacteria, UA None Seen /HPF      Squamous Epithelial Cells, UA 0-2 /HPF      Hyaline Casts, UA 3-6 /LPF      Methodology Automated Microscopy    Hemoglobin & Hematocrit, Blood [[id number]]  (Abnormal) Collected:  07/03/18 0335    Specimen:  Blood Updated:  07/03/18 0343     Hemoglobin 7.1 (L) g/dL      Hematocrit 22.5 (L) %     CBC Auto Differential [[id number]]  (Abnormal) Collected:  07/03/18 0025    Specimen:  Blood Updated:  07/03/18 0150     WBC 4.11 (L) 10*3/mm3      RBC 2.21 (L) 10*6/mm3      Hemoglobin 7.1 (L) g/dL      Hematocrit 21.3 (L) %      MCV 96.4 fL      MCH 32.1 (H) pg      MCHC 33.3 g/dL      RDW 14.3 %      RDW-SD 49.9 fl      MPV 9.7 fL      Platelets 308 10*3/mm3     Narrative:       The previously reported component NRBC is no longer being reported.    Manual Differential [[id number]]  (Abnormal) Collected:  07/03/18 0025    Specimen:  Blood Updated:  07/03/18 0150     Neutrophil % 49.0 %      Lymphocyte % 18.0 %      Monocyte % 7.0 %      Bands %  26.0 (H) %      Neutrophils Absolute 3.08 10*3/mm3      Lymphocytes Absolute 0.74 10*3/mm3      Monocytes Absolute 0.29 10*3/mm3      Anisocytosis Slight/1+     Poikilocytes Slight/1+     WBC Morphology Normal     Platelet Morphology Normal    Phosphorus [[id number]]  (Normal) Collected:  07/03/18 0025    Specimen:  Blood Updated:  07/03/18 0054     Phosphorus 2.5 mg/dL     Magnesium [[id number]]  (Normal) Collected:   07/03/18 0025    Specimen:  Blood Updated:  07/03/18 0054     Magnesium 1.7 mg/dL     Comprehensive Metabolic Panel [[id number]]  (Abnormal) Collected:  07/03/18 0025    Specimen:  Blood Updated:  07/03/18 0053     Glucose 110 (H) mg/dL      BUN 14 mg/dL      Creatinine 0.76 mg/dL      Sodium 136 mmol/L      Potassium 3.4 (L) mmol/L      Chloride 106 mmol/L      CO2 20.0 (L) mmol/L      Calcium 8.0 (L) mg/dL      Total Protein 5.1 (L) g/dL      Albumin 2.70 (L) g/dL      ALT (SGPT) 32 U/L      AST (SGOT) 35 U/L      Alkaline Phosphatase 44 U/L      Total Bilirubin 0.2 mg/dL      eGFR Non African Amer 72 mL/min/1.73      Globulin 2.4 gm/dL      A/G Ratio 1.1 g/dL      BUN/Creatinine Ratio 18.4     Anion Gap 10.0 mmol/L     Narrative:       The MDRD GFR formula is only valid for adults with stable renal function between ages 18 and 70.    POC Occult Blood Stool [[id number]]  (Abnormal) Collected:  07/02/18 2031    Specimen:  Stool Updated:  07/02/18 2033     Fecal Occult Blood Positive (A)     Lot Number [id number]     Expiration Date 03/31/2019     DEVELOPER LOT NUMBER 129     DEVELOPER EXPIRATION DATE 03/31/2019     Positive Control Positive     Negative Control Negative    CBC & Differential [[id number]] Collected:  07/02/18 1833    Specimen:  Blood Updated:  07/02/18 1921    Narrative:       The following orders were created for panel order CBC & Differential.  Procedure                               Abnormality         Status                     ---------                               -----------         ------                     Manual Differential[[id number]]          Abnormal            Final result               CBC Auto Differential[[id number]]        Abnormal            Final result                 Please view results for these tests on the individual orders.    CBC Auto Differential [[id number]]  (Abnormal) Collected:  07/02/18 1833    Specimen:  Blood Updated:  07/02/18 1921     WBC 4.72 (L) 10*3/mm3      RBC 2.34  (L) 10*6/mm3      Hemoglobin 7.3 (L) g/dL      Hematocrit 22.3 (L) %      MCV 95.3 fL      MCH 31.2 pg      MCHC 32.7 (L) g/dL      RDW 14.2 %      RDW-SD 49.0 fl      MPV 9.6 fL      Platelets 328 10*3/mm3     Manual Differential [[id number]]  (Abnormal) Collected:  07/02/18 1833    Specimen:  Blood Updated:  07/02/18 1921     Neutrophil % 48.3 %      Lymphocyte % 10.3 (L) %      Monocyte % 8.0 %      Basophil % 1.1 %      Bands %  28.7 (H) %      Metamyelocyte % 1.1 (H) %      Myelocyte % 2.3 (H) %      Neutrophils Absolute 3.63 10*3/mm3      Lymphocytes Absolute 0.49 (L) 10*3/mm3      Monocytes Absolute 0.38 10*3/mm3      Basophils Absolute 0.05 10*3/mm3      Hypochromia Slight/1+     Polychromasia Slight/1+     WBC Morphology Normal     Giant Platelets Mod/2+    Comprehensive Metabolic Panel [[id number]]  (Abnormal) Collected:  07/02/18 1833    Specimen:  Blood Updated:  07/02/18 1901     Glucose 114 (H) mg/dL      BUN 14 mg/dL      Creatinine 0.76 mg/dL      Sodium 137 mmol/L      Potassium 2.6 (C) mmol/L      Chloride 104 mmol/L      CO2 20.0 (L) mmol/L      Calcium 8.3 (L) mg/dL      Total Protein 5.2 (L) g/dL      Albumin 2.70 (L) g/dL      ALT (SGPT) 28 U/L      AST (SGOT) 32 U/L      Alkaline Phosphatase 46 U/L      Total Bilirubin 0.2 mg/dL      eGFR Non African Amer 72 mL/min/1.73      Globulin 2.5 gm/dL      A/G Ratio 1.1 g/dL      BUN/Creatinine Ratio 18.4     Anion Gap 13.0 mmol/L     Narrative:       The MDRD GFR formula is only valid for adults with stable renal function between ages 18 and 70.    Lactic Acid, Plasma [[id number]]  (Normal) Collected:  07/02/18 1833    Specimen:  Blood Updated:  07/02/18 1900     Lactate 0.9 mmol/L     Lipase [[id number]]  (Abnormal) Collected:  07/02/18 1833    Specimen:  Blood Updated:  07/02/18 1859     Lipase <10 (L) U/L     Protime-INR [[id number]]  (Abnormal) Collected:  07/02/18 1833    Specimen:  Blood Updated:  07/02/18 1856     Protime 17.3 (H) Seconds      INR  1.37 (H)          Radiology Review:  Imaging Results (last 72 hours)     Procedure Component Value Units Date/Time    XR Chest 1 View [[id number]] Collected:  07/03/18 0743     Updated:  07/03/18 0746    Narrative:       XR CHEST 1 VW- 7/3/2018 3:12 AM CDT     HISTORY: leukopenia; D64.9-Anemia, unspecified; K92.2-Gastrointestinal  hemorrhage, unspecified; E87.6-Hypokalemia; R19.7-Diarrhea, unspecified;  R11.2-Nausea with vomiting, unspecified; K59.39-Other megacolon       COMPARISON: None.     FINDINGS:   The lungs are clear. The cardiomediastinal silhouette and pulmonary  vascularity are within normal limits.      The osseous structures and surrounding soft tissues demonstrate no acute  abnormality. Surgical clips are seen in the neck.       Impression:       1. No radiographic evidence of acute cardiopulmonary process.        This report was finalized on 07/03/2018 07:43 by Dr. Corey Rodriguez MD.    XR Abdomen KUB [[id number]] Collected:  07/02/18 1812     Updated:  07/02/18 1816    Narrative:       XR ABDOMEN KUB- 7/2/2018 5:56 PM CDT     HISTORY: diarrhea       COMPARISON: None     FINDINGS:  There is a nonobstructive bowel gas pattern. Moderate colonic stool with  mild dilatation of the ascending colon up to 6.3 cm. Small bowel appears  decompressed. Extensive calcification of the costal cartilages. Splenic  artery calcification. Suture material noted in the left upper quadrant.  Evaluation for free intraperitoneal air is limited on a supine  radiograph, but there are no definite findings of free intraperitoneal  air.      The osseous structures demonstrate no acute abnormality. 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       Impression:       1. No radiographic evidence of acute abdominopelvic process. Bowel gas  pattern is nonobstructive.  2. Moderate colonic stool with mild dilatation of the ascending colon up  to 6.3 cm.        This report was finalized on 07/02/2018 18:13 by Dr Oropzea  Ruddy, .          Impression/Plan:  Patient Active Problem List   Diagnosis Code   • Essential hypertension I10   • Atherosclerosis of both carotid arteries I65.23   • Chest pain syndrome R07.9   • Hyperlipidemia LDL goal <100 E78.5   • Bradycardia on ECG R00.1   • Preoperative cardiovascular examination Z01.810   • Anemia D64.9       Diarrhea  History is most consistent with infectious cause specifically Clostridium difficile given her recent antibiotics.  Stool PCR is pending.  She is up to date on colonoscopy.  No plans to repeat at this stage.    Heme positive stool/anemia  Hemoglobin is in the low 7 range.  The patient has been on NSAIDs.  I prefer to proceed with endoscopy today as tomorrow is a holiday.  She has been NPO.    The risks, benefits, and alternatives of endoscopy were reviewed with the patient today.  Risks including perforation, with or without dilation, possibly requiring surgery.  Additional risks include risk of bleeding.  There is also the risk of a drug reaction or problems with anesthesia.  This will be discussed with the further by the anesthesia team on the day of the procedure. The benefits include the diagnosis and management of disease of the upper digestive tract.  Alternatives to endoscopy include upper GI series, laboratory testing, radiographic evaluation, or no intervention.  The patient verbalizes understanding and agrees.    Chronic NSAID use    Rachana King EARNEST  07/03/18   10:41 AM    Patient Seen, Chart, Consults, Notes, Labs, Radiology studies reviewed    Latoya Dozier MD  [facility] acute hospital Gastroenterology  07/03/18  12:15 PM    Much of this encounter note is an electronic transcription/translation of spoken language to printed text. The electronic translation of spoken language may permit erroneous, or at times, nonsensical words or phrases to be inadvertently transcribed; although I have reviewed the note for such errors, some may still exist.

## 2018-07-03 NOTE — PLAN OF CARE
Problem: Fall Risk (Adult)  Goal: Identify Related Risk Factors and Signs and Symptoms  PT is to remain free from falls and Extremity injury, by using the call light each and every time she needs to use the BSC.

## 2018-07-03 NOTE — CONSULTS
[facility]enter[facility]  Inpatient Consult Note  Today's date:  07/03/18    Cristiane Harris  11/13/1929       Referring Provider: Mayela Hartmann MD  Primary Physician: Julius Daniel MD   Primary Gastroenterologist: Unknown    Date of Admission: 7/2/2018  Date of Service:  07/03/18    Reason for Consultation/Chief Complaint: Diarrhea/anemia    History of present illness: This is a very pleasant 88-year-old female who tells me today that on Saturday she began to have diarrhea.  She states that this continued all day and into Sunday until yesterday she came to the hospital.  She states that she really had no abdominal pain with this.  She states that she had 102 fever at home and her son is at bedside in agrees with this.  The patient and her son state that she was having approximately 25 stools a day.  She tells me that today it has decreased some.  Of note the patient tells me that she had a UTI in June and was treated with this with an antibiotic however she does not remember the name of it.  She states that she took this for about 7 days and then she underwent knee surgery in [place] where her son states that she was given IV antibiotics at that time as well.  She states she was given 650 mg of aspirin twice a day after surgery and has continued to take it since that time.  She tells me that she has had a history of chronic anemia.    The patient denies any nausea, vomiting, epigastric pain, dysphagia, pyrosis or hematemesis.  The patient denies any fever or chills.  Denies any melena or hematochezia.  Denies any unintentional weight loss or loss of appetite.    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  According to records from [facility] in care everywhere the patient had a colonoscopy in 2011 and 2012.  The patient tells me that she has had a history of colon polyps.  She also tells me that her father had colon cancer greater than  60 years old.  The patient's last EGD was performed on 7/25/14 for chronic anemia.  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  The patient underwent Whipple per Dr. Stewart Merchant in [facility]. The patient tells me that she was told this was nonmalignant however she states that she has been treated with Gleevac since that time.    Labs and imaging: CMP on admission revealed hyperglycemia, hypokalemia and hypocalcemia, total protein 5.2, 2.70.  INR 1.37.  CBC reveals a hemoglobin of 7.3.  Hemoglobin today 7.1.  Stools positive for occult blood.  Iron profile reveals iron of less than 10, ferritin 47.9, total iron-binding capacity 293.  Percent saturation lab unable to calculate.  KUB revealed no acute abdominal pelvic process.  Moderate colonic stool with mild dilation of ascending colon up to 6.3 cm.    Past Medical History:   Diagnosis Date   • Anemia    • Arthritis    • GERD (gastroesophageal reflux disease)    • GIST (gastrointestinal stromal tumor), non-malignant    • Hyperlipemia    • Hypertension    • Skin cancer     squamous cell   • Stenosis of carotid artery        Past Surgical History:   Procedure Laterality Date   • BREAST SURGERY  2011   • CAROTID ENDARTERECTOMY Left    • CATARACT EXTRACTION W/ INTRAOCULAR LENS IMPLANT     • JOINT REPLACEMENT     • OTHER SURGICAL HISTORY      Facial Surgery - Skin cancer removed   • THROMBOENDARTERECTOMY Right 11/20/2012    carotid   • TOTAL KNEE ARTHROPLASTY Right    • TUMOR REMOVAL      GASTROINTESTINAL STOMA TUMOR   • WHIPPLE PROCEDURE          No Known Allergies    Prescriptions Prior to Admission   Medication Sig Dispense Refill Last Dose   • aspirin 81 MG tablet Take 81 mg by mouth Daily.   Taking   • diltiaZEM CD (CARDIZEM CD) 120 MG 24 hr capsule Take 1 capsule by mouth Daily for 360 days. 90 capsule 3    • dorzolamide (TRUSOPT) 2 % ophthalmic solution Administer  1 drop to both eyes 2 (Two) Times a Day.   Taking   • esomeprazole (nexIUM) 40 MG capsule Take 40 mg by mouth 2 (Two) Times a Day.   Taking   • Glucosamine HCl (GLUCOSAMINE PO) Take 1 tablet by mouth Daily.   Taking   • imatinib (GLEEVEC) 400 MG chemo tablet Take 200 mg by mouth daily.   Taking   • Multiple Vitamins-Minerals (MULTIVITAMIN PO) Take 1 tablet by mouth Daily.   Taking   • omeprazole (PriLOSEC) 20 MG capsule Take 20 mg by mouth Daily.   Taking   • pentosan polysulfate (ELMIRON) 100 MG capsule Take  by mouth 2 (two) times a day.   Taking   • simvastatin (ZOCOR) 20 MG tablet Take 20 mg by mouth Every Night.   Taking   • telmisartan (MICARDIS) 40 MG tablet Take 40 mg by mouth Daily.   Taking   • timolol (TIMOPTIC) 0.5 % ophthalmic solution Administer 1 drop to both eyes 2 (Two) Times a Day.   Taking   • TRAMADOL HCL PO Take 1 tablet by mouth As Needed.   Taking   • TRAVATAN Z 0.004 % solution ophthalmic solution Administer 1 drop to both eyes Daily.   Taking       Hospital Medications (active)       Dose Frequency Start End    atorvastatin (LIPITOR) tablet 10 mg 10 mg Nightly 7/2/2018     Sig - Route: Take 1 tablet by mouth Every Night. - Oral    diltiaZEM CD (CARDIZEM CD) 24 hr capsule 120 mg 120 mg Daily 7/3/2018 6/1/2019    Sig - Route: Take 1 capsule by mouth Daily. - Oral    dorzolamide (TRUSOPT) 2 % ophthalmic solution 1 drop 1 drop 2 Times Daily 7/3/2018     Sig - Route: Administer 1 drop to both eyes 2 (Two) Times a Day. - Both Eyes    iron sucrose (VENOFER) 300 mg in sodium chloride 0.9 % 100 mL IVPB 300 mg Every 24 Hours 7/3/2018 7/6/2018    Sig - Route: Infuse 300 mg into a venous catheter Daily. - Intravenous    latanoprost (XALATAN) 0.005 % ophthalmic solution 1 drop 1 drop Nightly 7/3/2018     Sig - Route: Administer 1 drop to both eyes Every Night. - Both Eyes    ondansetron (ZOFRAN) injection 4 mg 4 mg Every 6 Hours PRN 7/2/2018     Sig - Route: Infuse 2 mL into a venous catheter Every 6  "(Six) Hours As Needed for Nausea or Vomiting. - Intravenous    pantoprazole (PROTONIX) 40 mg/100 mL (0.4 mg/mL) in 0.9% NS IVPB 8 mg/hr Continuous 7/2/2018     Sig - Route: Infuse 8 mg/hr into a venous catheter Continuous. - Intravenous    pentosan polysulfate (ELMIRON) capsule 100 mg 100 mg 2 Times Daily Before Meals 7/2/2018     Sig - Route: Take 1 capsule by mouth 2 (Two) Times a Day Before Meals. - Oral    potassium chloride (MICRO-K) CR capsule 40 mEq 40 mEq Once 7/2/2018 7/2/2018    Sig - Route: Take 4 capsules by mouth 1 (One) Time. - Oral    potassium chloride 10 mEq in 100 mL IVPB 10 mEq Once 7/2/2018 7/2/2018    Sig - Route: Infuse 100 mL into a venous catheter 1 (One) Time. - Intravenous    potassium chloride 20 mEq in 100 mL IVPB 20 mEq Once 7/2/2018 7/3/2018    Sig - Route: Infuse 100 mL into a venous catheter 1 (One) Time. - Intravenous    potassium chloride 20 mEq in 100 mL IVPB 20 mEq Once 7/3/2018 7/3/2018    Sig - Route: Infuse 100 mL into a venous catheter 1 (One) Time. - Intravenous    sodium chloride 0.9 % bolus 1,000 mL 1,000 mL Once 7/2/2018 7/2/2018    Sig - Route: Infuse 1,000 mL into a venous catheter 1 (One) Time. - Intravenous    sodium chloride 0.9 % flush 1-10 mL 1-10 mL As Needed 7/2/2018     Sig - Route: Infuse 1-10 mL into a venous catheter As Needed for Line Care. - Intravenous    sodium chloride 0.9 % flush 10 mL 10 mL As Needed 7/2/2018     Sig - Route: Infuse 10 mL into a venous catheter As Needed for Line Care. - Intravenous    Linked Group 1:  \"And\" Linked Group Details        sodium chloride 0.9 % infusion 50 mL/hr Continuous 7/2/2018     Sig - Route: Infuse 50 mL/hr into a venous catheter Continuous. - Intravenous    timolol (TIMOPTIC) 0.5 % ophthalmic solution 1 drop 1 drop Every 12 Hours Scheduled 7/3/2018     Sig - Route: Administer 1 drop to both eyes Every 12 (Twelve) Hours. - Both Eyes    losartan (COZAAR) tablet 50 mg (Discontinued) 50 mg Every 24 Hours Scheduled " 7/3/2018 7/3/2018    Sig - Route: Take 1 tablet by mouth Daily. - Oral          Social History   Substance Use Topics   • Smoking status: Never Smoker   • Smokeless tobacco: Never Used   • Alcohol use No        Past Family History:  Family History   Problem Relation Age of Onset   • Cancer Mother    • Aneurysm Father         of abdominal aorta   • Coronary artery disease Father    • Diabetes Father    • Cancer Sister        Review of Systems:  Review of Systems   Constitutional: Negative for fever and unexpected weight change.   HENT: Negative for hearing loss.    Eyes: Negative for visual disturbance.   Respiratory: Negative for cough.    Cardiovascular: Negative for chest pain.   Gastrointestinal:        See HPI   Endocrine: Negative for cold intolerance and heat intolerance.   Genitourinary: Negative for dysuria.   Musculoskeletal: Negative for arthralgias.   Skin: Negative for rash.   Neurological: Negative for seizures.   Psychiatric/Behavioral: Negative for hallucinations.       Physical Exam:  Temp:  [98.1 °F (36.7 °C)-100.1 °F (37.8 °C)] 98.4 °F (36.9 °C)  Heart Rate:  [] 81  Resp:  [14-22] 18  BP: ()/() 135/57  Body mass index is 17.89 kg/m².    Intake/Output Summary (Last 24 hours) at 07/03/18 1041  Last data filed at 07/03/18 0751   Gross per 24 hour   Intake          1277.17 ml   Output             1825 ml   Net          -547.83 ml     I/O this shift:  In: 435 [I.V.:363; IV Piggyback:72]  Out: -   Physical Exam   Constitutional: She is oriented to person, place, and time. She appears well-developed and well-nourished.   HENT:   Mouth/Throat: Oropharynx is clear and moist.   Eyes: EOM are normal.   Cardiovascular: Normal rate, regular rhythm and normal heart sounds.  Exam reveals no gallop and no friction rub.    No murmur heard.  Pulmonary/Chest: Effort normal and breath sounds normal. She has no wheezes. She has no rales.   Abdominal: Soft. Bowel sounds are normal. She exhibits no  distension. There is no hepatosplenomegaly. There is no tenderness. There is no rigidity, no rebound and no guarding.   Musculoskeletal: Normal range of motion. She exhibits no edema, tenderness or deformity.   Neurological: She is alert and oriented to person, place, and time.   Skin: Skin is warm and dry. No rash noted.   Psychiatric: She has a normal mood and affect. Her behavior is normal. Judgment and thought content normal.   Vitals reviewed.    I have performed the physical exam and agree with the findings as noted above.   RAMON Dozier MD    Results Review:  Lab Results (last 24 hours)     Procedure Component Value Units Date/Time    Gastrointestinal Panel, PCR - Stool, Per Rectum [[id number]] Collected:  07/03/18 1006    Specimen:  Stool from Per Rectum Updated:  07/03/18 1032    Vitamin B12 [[id number]]  (Abnormal) Collected:  07/03/18 0819    Specimen:  Blood Updated:  07/03/18 1004     Vitamin B-12 995 (H) pg/mL     Folate [[id number]] Collected:  07/03/18 0819    Specimen:  Blood Updated:  07/03/18 1004     Folate >20.00 ng/mL     Ferritin [[id number]]  (Normal) Collected:  07/03/18 0819    Specimen:  Blood Updated:  07/03/18 0934     Ferritin 47.90 ng/mL     Iron Profile [[id number]]  (Abnormal) Collected:  07/03/18 0819    Specimen:  Blood Updated:  07/03/18 0923     Iron <10 (L) mcg/dL      TIBC 293 mcg/dL      Iron Saturation -- %      Comment: Unable to calculate.       Blood Culture - Blood, [[id number]]  (Normal) Collected:  07/02/18 2000    Specimen:  Blood from Arm, Left Updated:  07/03/18 0915     Blood Culture No growth at less than 24 hours    Blood Culture - Blood, [[id number]]  (Normal) Collected:  07/02/18 1833    Specimen:  Blood from Arm, Left Updated:  07/03/18 0715     Blood Culture No growth at less than 24 hours    Urinalysis With Culture If Indicated - Urine, Clean Catch [[id number]]  (Abnormal) Collected:  07/03/18 0643    Specimen:  Urine from Urine, Clean Catch Updated:  07/03/18 0712      Color, UA Yellow     Appearance, UA Clear     pH, UA <=5.0     Specific Gravity, UA 1.018     Glucose, UA Negative     Ketones, UA Trace (A)     Bilirubin, UA Negative     Blood, UA Negative     Protein, UA Trace (A)     Leuk Esterase, UA Small (1+) (A)     Nitrite, UA Negative     Urobilinogen, UA 0.2 E.U./dL    Urinalysis, Microscopic Only - Urine, Clean Catch [[id number]]  (Abnormal) Collected:  07/03/18 0643    Specimen:  Urine from Urine, Clean Catch Updated:  07/03/18 0712     RBC, UA 6-12 (A) /HPF      WBC, UA 3-5 (A) /HPF      Bacteria, UA None Seen /HPF      Squamous Epithelial Cells, UA 0-2 /HPF      Hyaline Casts, UA 3-6 /LPF      Methodology Automated Microscopy    Hemoglobin & Hematocrit, Blood [[id number]]  (Abnormal) Collected:  07/03/18 0335    Specimen:  Blood Updated:  07/03/18 0343     Hemoglobin 7.1 (L) g/dL      Hematocrit 22.5 (L) %     CBC Auto Differential [[id number]]  (Abnormal) Collected:  07/03/18 0025    Specimen:  Blood Updated:  07/03/18 0150     WBC 4.11 (L) 10*3/mm3      RBC 2.21 (L) 10*6/mm3      Hemoglobin 7.1 (L) g/dL      Hematocrit 21.3 (L) %      MCV 96.4 fL      MCH 32.1 (H) pg      MCHC 33.3 g/dL      RDW 14.3 %      RDW-SD 49.9 fl      MPV 9.7 fL      Platelets 308 10*3/mm3     Narrative:       The previously reported component NRBC is no longer being reported.    Manual Differential [[id number]]  (Abnormal) Collected:  07/03/18 0025    Specimen:  Blood Updated:  07/03/18 0150     Neutrophil % 49.0 %      Lymphocyte % 18.0 %      Monocyte % 7.0 %      Bands %  26.0 (H) %      Neutrophils Absolute 3.08 10*3/mm3      Lymphocytes Absolute 0.74 10*3/mm3      Monocytes Absolute 0.29 10*3/mm3      Anisocytosis Slight/1+     Poikilocytes Slight/1+     WBC Morphology Normal     Platelet Morphology Normal    Phosphorus [[id number]]  (Normal) Collected:  07/03/18 0025    Specimen:  Blood Updated:  07/03/18 0054     Phosphorus 2.5 mg/dL     Magnesium [[id number]]  (Normal) Collected:   07/03/18 0025    Specimen:  Blood Updated:  07/03/18 0054     Magnesium 1.7 mg/dL     Comprehensive Metabolic Panel [[id number]]  (Abnormal) Collected:  07/03/18 0025    Specimen:  Blood Updated:  07/03/18 0053     Glucose 110 (H) mg/dL      BUN 14 mg/dL      Creatinine 0.76 mg/dL      Sodium 136 mmol/L      Potassium 3.4 (L) mmol/L      Chloride 106 mmol/L      CO2 20.0 (L) mmol/L      Calcium 8.0 (L) mg/dL      Total Protein 5.1 (L) g/dL      Albumin 2.70 (L) g/dL      ALT (SGPT) 32 U/L      AST (SGOT) 35 U/L      Alkaline Phosphatase 44 U/L      Total Bilirubin 0.2 mg/dL      eGFR Non African Amer 72 mL/min/1.73      Globulin 2.4 gm/dL      A/G Ratio 1.1 g/dL      BUN/Creatinine Ratio 18.4     Anion Gap 10.0 mmol/L     Narrative:       The MDRD GFR formula is only valid for adults with stable renal function between ages 18 and 70.    POC Occult Blood Stool [[id number]]  (Abnormal) Collected:  07/02/18 2031    Specimen:  Stool Updated:  07/02/18 2033     Fecal Occult Blood Positive (A)     Lot Number [id number]     Expiration Date 03/31/2019     DEVELOPER LOT NUMBER 129     DEVELOPER EXPIRATION DATE 03/31/2019     Positive Control Positive     Negative Control Negative    CBC & Differential [[id number]] Collected:  07/02/18 1833    Specimen:  Blood Updated:  07/02/18 1921    Narrative:       The following orders were created for panel order CBC & Differential.  Procedure                               Abnormality         Status                     ---------                               -----------         ------                     Manual Differential[[id number]]          Abnormal            Final result               CBC Auto Differential[[id number]]        Abnormal            Final result                 Please view results for these tests on the individual orders.    CBC Auto Differential [[id number]]  (Abnormal) Collected:  07/02/18 1833    Specimen:  Blood Updated:  07/02/18 1921     WBC 4.72 (L) 10*3/mm3      RBC 2.34  (L) 10*6/mm3      Hemoglobin 7.3 (L) g/dL      Hematocrit 22.3 (L) %      MCV 95.3 fL      MCH 31.2 pg      MCHC 32.7 (L) g/dL      RDW 14.2 %      RDW-SD 49.0 fl      MPV 9.6 fL      Platelets 328 10*3/mm3     Manual Differential [[id number]]  (Abnormal) Collected:  07/02/18 1833    Specimen:  Blood Updated:  07/02/18 1921     Neutrophil % 48.3 %      Lymphocyte % 10.3 (L) %      Monocyte % 8.0 %      Basophil % 1.1 %      Bands %  28.7 (H) %      Metamyelocyte % 1.1 (H) %      Myelocyte % 2.3 (H) %      Neutrophils Absolute 3.63 10*3/mm3      Lymphocytes Absolute 0.49 (L) 10*3/mm3      Monocytes Absolute 0.38 10*3/mm3      Basophils Absolute 0.05 10*3/mm3      Hypochromia Slight/1+     Polychromasia Slight/1+     WBC Morphology Normal     Giant Platelets Mod/2+    Comprehensive Metabolic Panel [[id number]]  (Abnormal) Collected:  07/02/18 1833    Specimen:  Blood Updated:  07/02/18 1901     Glucose 114 (H) mg/dL      BUN 14 mg/dL      Creatinine 0.76 mg/dL      Sodium 137 mmol/L      Potassium 2.6 (C) mmol/L      Chloride 104 mmol/L      CO2 20.0 (L) mmol/L      Calcium 8.3 (L) mg/dL      Total Protein 5.2 (L) g/dL      Albumin 2.70 (L) g/dL      ALT (SGPT) 28 U/L      AST (SGOT) 32 U/L      Alkaline Phosphatase 46 U/L      Total Bilirubin 0.2 mg/dL      eGFR Non African Amer 72 mL/min/1.73      Globulin 2.5 gm/dL      A/G Ratio 1.1 g/dL      BUN/Creatinine Ratio 18.4     Anion Gap 13.0 mmol/L     Narrative:       The MDRD GFR formula is only valid for adults with stable renal function between ages 18 and 70.    Lactic Acid, Plasma [[id number]]  (Normal) Collected:  07/02/18 1833    Specimen:  Blood Updated:  07/02/18 1900     Lactate 0.9 mmol/L     Lipase [[id number]]  (Abnormal) Collected:  07/02/18 1833    Specimen:  Blood Updated:  07/02/18 1859     Lipase <10 (L) U/L     Protime-INR [[id number]]  (Abnormal) Collected:  07/02/18 1833    Specimen:  Blood Updated:  07/02/18 1856     Protime 17.3 (H) Seconds      INR  1.37 (H)          Radiology Review:  Imaging Results (last 72 hours)     Procedure Component Value Units Date/Time    XR Chest 1 View [[id number]] Collected:  07/03/18 0743     Updated:  07/03/18 0746    Narrative:       XR CHEST 1 VW- 7/3/2018 3:12 AM CDT     HISTORY: leukopenia; D64.9-Anemia, unspecified; K92.2-Gastrointestinal  hemorrhage, unspecified; E87.6-Hypokalemia; R19.7-Diarrhea, unspecified;  R11.2-Nausea with vomiting, unspecified; K59.39-Other megacolon       COMPARISON: None.     FINDINGS:   The lungs are clear. The cardiomediastinal silhouette and pulmonary  vascularity are within normal limits.      The osseous structures and surrounding soft tissues demonstrate no acute  abnormality. Surgical clips are seen in the neck.       Impression:       1. No radiographic evidence of acute cardiopulmonary process.        This report was finalized on 07/03/2018 07:43 by Dr. Corey Rodriguez MD.    XR Abdomen KUB [[id number]] Collected:  07/02/18 1812     Updated:  07/02/18 1816    Narrative:       XR ABDOMEN KUB- 7/2/2018 5:56 PM CDT     HISTORY: diarrhea       COMPARISON: None     FINDINGS:  There is a nonobstructive bowel gas pattern. Moderate colonic stool with  mild dilatation of the ascending colon up to 6.3 cm. Small bowel appears  decompressed. Extensive calcification of the costal cartilages. Splenic  artery calcification. Suture material noted in the left upper quadrant.  Evaluation for free intraperitoneal air is limited on a supine  radiograph, but there are no definite findings of free intraperitoneal  air.      The osseous structures demonstrate no acute abnormality. 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       Impression:       1. No radiographic evidence of acute abdominopelvic process. Bowel gas  pattern is nonobstructive.  2. Moderate colonic stool with mild dilatation of the ascending colon up  to 6.3 cm.        This report was finalized on 07/02/2018 18:13 by Dr Oropeza  Ruddy, .          Impression/Plan:  Patient Active Problem List   Diagnosis Code   • Essential hypertension I10   • Atherosclerosis of both carotid arteries I65.23   • Chest pain syndrome R07.9   • Hyperlipidemia LDL goal <100 E78.5   • Bradycardia on ECG R00.1   • Preoperative cardiovascular examination Z01.810   • Anemia D64.9       Diarrhea  History is most consistent with infectious cause specifically Clostridium difficile given her recent antibiotics.  Stool PCR is pending.  She is up to date on colonoscopy.  No plans to repeat at this stage.    Heme positive stool/anemia  Hemoglobin is in the low 7 range.  The patient has been on NSAIDs.  I prefer to proceed with endoscopy today as tomorrow is a holiday.  She has been NPO.    The risks, benefits, and alternatives of endoscopy were reviewed with the patient today.  Risks including perforation, with or without dilation, possibly requiring surgery.  Additional risks include risk of bleeding.  There is also the risk of a drug reaction or problems with anesthesia.  This will be discussed with the further by the anesthesia team on the day of the procedure. The benefits include the diagnosis and management of disease of the upper digestive tract.  Alternatives to endoscopy include upper GI series, laboratory testing, radiographic evaluation, or no intervention.  The patient verbalizes understanding and agrees.    Chronic NSAID use    Rahcana King EARNEST  07/03/18   10:41 AM    Patient Seen, Chart, Consults, Notes, Labs, Radiology studies reviewed    Latoya Dozier MD  [facility]  07/03/18  12:15 PM    Much of this encounter note is an electronic transcription/translation of spoken language to printed text. The electronic translation of spoken language may permit erroneous, or at times, nonsensical words or phrases to be inadvertently transcribed; although I have reviewed the note for such errors, some may still exist.

## 2018-07-03 NOTE — PLAN OF CARE
Problem: Skin Injury Risk (Adult)  Goal: Identify Related Risk Factors and Signs and Symptoms  Outcome: Ongoing (interventions implemented as appropriate)  PT has frequent liquid BM's and has a significant unintentional weight ly w/ decreased fluid and nutritional intake x3 days.  Goal: Skin Health and Integrity  Outcome: Ongoing (interventions implemented as appropriate)

## 2018-07-03 NOTE — PROGRESS NOTES
[facility] Medicine Services  INPATIENT PROGRESS NOTE    Patient Name: Cristiane Harris  Date of Admission: 7/2/2018  Today's Date: 07/03/18  Length of Stay: 1  Primary Care Physician: Julius Daniel MD    Subjective   Chief Complaint: diarrhea     HPI   The patient is currently laying in bed.  She states that she is having lots of diarrhea.  15+ stools/day since Saturday night.  She is having some mild abdominal pain.  She had a total knee replacement from Dr. Fleming in [place], [place] on 6/20/18.  Doing well from that, was taking  mg BID after surgery per his order.  She denies any hematemesis or melena.  She does state that she has bad hemorrhoids.  She is from [place], [place] but came to [place], [place] to stay with her son while she had/recovered from her TKR.  She states that she has had some chills and subjective fevers.  Temp max here 100.1.  She has had some nausea and vomiting as well.  Poor appetite, has not ate since Saturday night.  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     Review of Systems   Constitutional: Positive for activity change, appetite change, chills, fatigue and fever.   HENT: Negative for hearing loss, nosebleeds, tinnitus and trouble swallowing.    Eyes: Negative for visual disturbance.   Respiratory: Negative for cough, chest tightness, shortness of breath and wheezing.    Cardiovascular: Negative for chest pain, palpitations and leg swelling.   Gastrointestinal: Positive for abdominal pain, diarrhea, nausea and vomiting. Negative for abdominal distention, blood in stool and constipation.   Endocrine: Negative for cold intolerance, heat intolerance, polydipsia, polyphagia and polyuria.   Genitourinary: Negative for decreased urine volume, difficulty urinating, dysuria, flank pain, frequency and hematuria.   Musculoskeletal: Negative for arthralgias, joint swelling and myalgias.   Skin:  Negative for rash.   Allergic/Immunologic: Negative for immunocompromised state.   Neurological: Positive for weakness. Negative for dizziness, syncope, light-headedness and headaches.   Hematological: Negative for adenopathy. Does not bruise/bleed easily.   Psychiatric/Behavioral: Negative for confusion and sleep disturbance. The patient is not nervous/anxious.       All pertinent negatives and positives are as above. All other systems have been reviewed and are negative unless otherwise stated.     Objective    Temp:  [98.1 °F (36.7 °C)-100.1 °F (37.8 °C)] 98.1 °F (36.7 °C)  Heart Rate:  [] 71  Resp:  [14-22] 21  BP: ()/() 110/51     Physical Exam   Constitutional: She is oriented to person, place, and time. She appears well-developed and well-nourished.   HENT:   Head: Normocephalic and atraumatic.   Eyes: Conjunctivae and EOM are normal. Pupils are equal, round, and reactive to light.   Neck: Neck supple. No JVD present. No thyromegaly present.   Cardiovascular: Normal rate, regular rhythm, normal heart sounds and intact distal pulses.  Exam reveals no gallop and no friction rub.    No murmur heard.  Pulmonary/Chest: Effort normal and breath sounds normal. No respiratory distress. She has no wheezes. She has no rales. She exhibits no tenderness.   Abdominal: Soft. Bowel sounds are normal. She exhibits no distension. There is tenderness (mild ). There is no rebound and no guarding.   Musculoskeletal: Normal range of motion. She exhibits no edema, tenderness or deformity.   Right knee surgical wound with mepliex (C/D/I).   Lymphadenopathy:     She has no cervical adenopathy.   Neurological: She is alert and oriented to person, place, and time. She displays normal reflexes. No cranial nerve deficit. She exhibits normal muscle tone.   Skin: Skin is warm and dry. No rash noted.   Psychiatric: She has a normal mood and affect. Her behavior is normal. Judgment and thought content normal.   Nursing  note and vitals reviewed.    Results Review:  I have reviewed the labs, radiology results, and diagnostic studies.    Laboratory Data:     Results from last 7 days  Lab Units 07/03/18  0335 07/03/18  0025 07/02/18  1833   WBC 10*3/mm3  --  4.11* 4.72*   HEMOGLOBIN g/dL 7.1* 7.1* 7.3*   HEMATOCRIT % 22.5* 21.3* 22.3*   PLATELETS 10*3/mm3  --  308 328        Results from last 7 days  Lab Units 07/03/18  0025 07/02/18  1833   SODIUM mmol/L 136 137   POTASSIUM mmol/L 3.4* 2.6*   CHLORIDE mmol/L 106 104   CO2 mmol/L 20.0* 20.0*   BUN mg/dL 14 14   CREATININE mg/dL 0.76 0.76   CALCIUM mg/dL 8.0* 8.3*   BILIRUBIN mg/dL 0.2 0.2   ALK PHOS U/L 44 46   ALT (SGPT) U/L 32 28   AST (SGOT) U/L 35 32   GLUCOSE mg/dL 110* 114*       Results from last 7 days  Lab Units 07/03/18  0819   IRON mcg/dL <10*   TIBC mcg/dL 293     Culture Data:   Blood Culture   Date Value Ref Range Status   07/02/2018 No growth at less than 24 hours  Preliminary     Radiology Data:   Imaging Results (last 24 hours)     Procedure Component Value Units Date/Time    XR Chest 1 View [[id number]] Updated:  07/03/18 0357    XR Abdomen KUB [[id number]] Collected:  07/02/18 1812     Updated:  07/02/18 1816    Narrative:       XR ABDOMEN KUB- 7/2/2018 5:56 PM CDT     HISTORY: diarrhea       COMPARISON: None     FINDINGS:  There is a nonobstructive bowel gas pattern. Moderate colonic stool with  mild dilatation of the ascending colon up to 6.3 cm. Small bowel appears  decompressed. Extensive calcification of the costal cartilages. Splenic  artery calcification. Suture material noted in the left upper quadrant.  Evaluation for free intraperitoneal air is limited on a supine  radiograph, but there are no definite findings of free intraperitoneal  air.      The osseous structures demonstrate no acute abnormality. 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       Impression:       1. No radiographic evidence of acute abdominopelvic process. Bowel  gas  pattern is nonobstructive.  2. Moderate colonic stool with mild dilatation of the ascending colon up  to 6.3 cm.        This report was finalized on 07/02/2018 18:13 by Dr Sandip Fox, .        I have reviewed the patient's current medications.     Assessment/Plan     Assessment:  1.  Nausea and vomiting  2.  Intractable diarrhea  3.  Abdominal pain, generalized   4.  Hypokalemia  5.  Positive fecal occult   6.  Anemia  7.  History of a gastrointestinal stromal tumor, non-malignant, status post whipple procedure in 09/2014   8.  Hyperlipidemia  9.  Essential hypertension  10.  Right total knee replacement, 6/20/18    Plan:  1.  IV fluids continued   2.  CBC and BMP in AM  3.  GI PCR  4.  Hemoglobin and hematocrit every 6 hours   5.  Anemia substrates  6.  Consult PT/OT - recent knee replacement  7.  Normal colonoscopy in May 2018  8.  SCDs for DVT prophylaxis   9.  GI consult per admission orders - no reports of melena or hematemesis.  Does have chronic anemia with recent surgical procedure.   10.  Up with assistance  11.  Monitor blood pressure closely  12.  Strict I&O  13.  Replace K+   14.  Type and screened - currently asymptomatic with hemoglobin 7.1   15.  Venofer     Discharge Planning: I expect the patient to be discharged to home in 2-4 days.    EARNEST Santos   07/03/18   7:27 AM    I personally evaluated and examined the patient in conjunction with EARNEST Marquez and agree with the assessment, treatment plan, and disposition of the patient as recorded by her. My history, exam, and further recommendations are: I have reviewed the labs obtained at an outside facility on May 25 prior to her knee surgery and at that time her hemoglobin was 10.9.  She does report having a history of chronic anemia.  Her primary symptoms recently have been persistent diarrhea, which has been nonbloody per her report.  She denies having any melena.  She reports no hematemesis.  She has been on 650 mg of aspirin  twice daily for DVT prophylaxis after her right total knee arthroplasty.  She does not describe any signs or symptoms of upper gastrointestinal bleeding.  She does report a history of hemorrhoids.  She is currently on PPI therapy, we will continue to monitor serial H&H's, and we will go ahead and start her on clear liquids at this time.  Her aspirin medication has been stopped, and she is actually requesting to get up and ambulate, will have physical therapy and occupational therapy assist us.  One of my primary concerns is her persistent diarrhea, and patient reports that she completed 7 days of antibiotics for urinary tract infection prior to her knee replacement.  A gastrointestinal PCR panel was just been sent and I do want to make sure that she does not have C. difficile.  Pending the above, anticipate she will be eligible for transfer to the medical floor later today for ongoing care.  Workup ongoing.        Ministerio Terrell MD  07/03/18  10:31 AM

## 2018-07-03 NOTE — THERAPY DISCHARGE NOTE
Acute Care - Occupational Therapy Initial Eval/Discharge  [facility]     Patient Name: Cristiane Harris  : 1929  MRN: [id number]  Today's Date: 7/3/2018  Onset of Illness/Injury or Date of Surgery: 18  Date of Referral to OT: 18  Referring Physician: EARNEST Marquez      Admit Date: 2018       ICD-10-CM ICD-9-CM   1. Anemia, unspecified type D64.9 285.9   2. Gastrointestinal hemorrhage, unspecified gastrointestinal hemorrhage type K92.2 578.9   3. Hypokalemia E87.6 276.8   4. Diarrhea, unspecified type R19.7 787.91   5. Nausea and vomiting, intractability of vomiting not specified, unspecified vomiting type R11.2 787.01   6. Dilatation of colon K59.39 564.7   7. Impaired mobility and ADLs Z74.09 799.89     Patient Active Problem List   Diagnosis   • Essential hypertension   • Atherosclerosis of both carotid arteries   • Chest pain syndrome   • Hyperlipidemia LDL goal <100   • Bradycardia on ECG   • Preoperative cardiovascular examination   • Anemia   • Nausea and vomiting     Past Medical History:   Diagnosis Date   • Anemia    • Arthritis    • GERD (gastroesophageal reflux disease)    • GIST (gastrointestinal stromal tumor), non-malignant    • Hyperlipemia    • Hypertension    • Skin cancer     squamous cell   • Stenosis of carotid artery      Past Surgical History:   Procedure Laterality Date   • BREAST SURGERY     • CAROTID ENDARTERECTOMY Left    • CATARACT EXTRACTION W/ INTRAOCULAR LENS IMPLANT     • JOINT REPLACEMENT     • OTHER SURGICAL HISTORY      Facial Surgery - Skin cancer removed   • THROMBOENDARTERECTOMY Right 2012    carotid   • TOTAL KNEE ARTHROPLASTY Right    • TUMOR REMOVAL      GASTROINTESTINAL STOMA TUMOR   • WHIPPLE PROCEDURE            OT ASSESSMENT FLOWSHEET (last 72 hours)      Occupational Therapy Evaluation     Row Name 18 1116                   OT Evaluation Time/Intention    Subjective Information no complaints  -CS        Document Type evaluation   -CS        Mode of Treatment occupational therapy  -CS           General Information    Patient Profile Reviewed? yes  -CS        Onset of Illness/Injury or Date of Surgery 07/02/18  -CS        Referring Physician EARNEST Marquez  -CS        Patient Observations alert;agree to therapy;cooperative  -CS        Patient/Family Observations Pt family present  -CS        General Observations of Patient Upon entering room, pt supine in bed with HOB elevated, IV, cont pulse ox, BP cuff  -CS        Prior Level of Function min assist:;bathing;independent:;dressing;all household mobility   PLOF since knee replacement 6/18, I prior to TKR  -CS        Equipment Currently Used at Home shower chair;walker, rolling;raised toilet;grab bar  -CS        Pertinent History of Current Functional Problem Pt present with abdominal pain, N/V, diarrhea, poor appetite.  Pt has history of TKR on 6/18.  DX: Acute anemia with possible GI bleed, H/H: 7.1/22.5  -CS        Existing Precautions/Restrictions fall  -CS        Risks Reviewed patient and family:;LOB;nausea/vomiting;dizziness;increased discomfort  -CS        Benefits Reviewed patient and family:;improve function;increase independence;increase strength;increase balance;decrease pain  -CS           Relationship/Environment    Lives With child(alicia), adult  -CS           Resource/Environmental Concerns    Current Living Arrangements home/apartment/condo  -CS           Home Main Entrance    Number of Stairs, Main Entrance one  -CS           Cognitive Assessment/Interventions    Additional Documentation Cognitive Assessment/Intervention (Group)  -CS           Cognitive Assessment/Intervention- PT/OT    Affect/Mental Status (Cognitive) WNL  -CS        Orientation Status (Cognition) oriented x 4  -CS        Follows Commands (Cognition) WNL  -CS        Cognitive Function (Cognitive) WNL  -CS        Personal Safety Interventions fall prevention program maintained;gait belt;nonskid shoes/slippers  when out of bed;supervised activity  -CS           Bed Mobility Assessment/Treatment    Bed Mobility Assessment/Treatment supine-sit;sit-supine  -CS        Supine-Sit [place] (Bed Mobility) conditional independence  -CS        Sit-Supine [place] (Bed Mobility) conditional independence  -CS           Functional Mobility    Functional Mobility- Ind. Level supervision required;independent  -CS        Functional Mobility- Comment Pt was able to walk to [facility] in room.  -CS           Transfer Assessment/Treatment    Transfer Assessment/Treatment sit-stand transfer;stand-sit transfer;toilet transfer  -CS           Sit-Stand Transfer    Sit-Stand [place] (Transfers) supervision;independent  -CS           Stand-Sit Transfer    Stand-Sit [place] (Transfers) supervision;independent  -CS           Toilet Transfer    Type (Toilet Transfer) stand-sit;sit-stand  -CS        [place] Level (Toilet Transfer) supervision;independent  -CS           ADL Assessment/Intervention    BADL Assessment/Intervention lower body dressing  -CS           Lower Body Dressing Assessment/Training    Lower Body Dressing [place] Level don;doff;undergarment;supervision;independent  -CS        Lower Body Dressing Position supported sitting;unsupported standing  -           General ROM    GENERAL ROM COMMENTS BUE AROM WFL  -           General Assessment (Manual Muscle Testing)    Comment, General Manual Muscle Testing (MMT) Assessment BUE functional strength: 4/5  -CS           Motor Assessment/Interventions    Additional Documentation Balance (Group)  -CS           Balance    Balance static sitting balance;static standing balance  -CS           Static Sitting Balance    Level of [place] (Unsupported Sitting, Static Balance) independent  -CS           Static Standing Balance    Level of [place] (Supported Standing, Static Balance) supervision;independent  -CS           Sensory Assessment/Intervention    Sensory  General Assessment no sensation deficits identified  -CS           Positioning and Restraints    Pre-Treatment Position in bed  -CS        Post Treatment Position bed  -CS        In Bed fowlers;call light within reach;encouraged to call for assist;with family/caregiver;patient within staff view;side rails up x2  -CS           Pain Assessment    Additional Documentation Pain Scale: Numbers Pre/Post-Treatment (Group)  -CS           Pain Scale: Numbers Pre/Post-Treatment    Pain Scale: Numbers, Pretreatment 0/10 - no pain  -CS           Plan of Care Review    Plan of Care Reviewed With patient;family  -CS           Clinical Impression (OT)    Date of Referral to OT 07/03/18  -CS        OT Diagnosis Impaired ADLs and functional mobility  -CS        Patient/Family Goals Statement (OT Eval) to go home  -CS        Criteria for Skilled Therapeutic Interventions Met (OT Eval) no;no problems identified which require skilled intervention;current level of function same as previous level of function  -CS        Therapy Frequency (OT Eval) evaluation only  -CS        Care Plan Review (OT) evaluation/treatment results reviewed  -CS        Anticipated Discharge Disposition (OT) home with assist  -CS           Living Environment    Home Accessibility stairs to enter home   walk in shower  -CS          User Key  (r) = Recorded By, (t) = Taken By, (c) = Cosigned By    Initials Name Effective Dates    CS Teresa Castellon, OTR/L 08/02/16 -               OT Recommendation and Plan  Outcome Summary/Treatment Plan (OT)  Anticipated Discharge Disposition (OT): home with assist  Therapy Frequency (OT Eval): evaluation only  Plan of Care Review  Plan of Care Reviewed With: patient  Plan of Care Reviewed With: patient  Outcome Summary: OT evaluation completed.  Pt demo no further need for skilled OT services due to pt completing LB dressing with S/independently and is able to complete functional mobility to [facility] with supervision/independent.  Pt  reports feeling at her baseline as she was at home as far as her functional status.  OT will sign off at this time due to pt being at her baseline.  It is recommended for pt to discharge home with her family upon hospital discharge.               Outcome Measures     Row Name 07/03/18 1116             How much help from another is currently needed...    Putting on and taking off regular lower body clothing? 4  -CS      Bathing (including washing, rinsing, and drying) 4  -CS      Toileting (which includes using toilet bed pan or urinal) 4  -CS      Putting on and taking off regular upper body clothing 4  -CS      Taking care of personal grooming (such as brushing teeth) 4  -CS      Eating meals 4  -CS      Score 24  -CS         Functional Assessment    Outcome Measure Options AM-PAC 6 Clicks Daily Activity (OT)  -CS        User Key  (r) = Recorded By, (t) = Taken By, (c) = Cosigned By    Initials Name Provider Type    CS Teresa Castellon OTR/L Occupational Therapist          Time Calculation:         Time Calculation- OT     Row Name 07/03/18 1331             Time Calculation- OT    OT Start Time 1116  -CS      OT Stop Time 1158  -CS      OT Time Calculation (min) 42 min  -CS      OT Received On 07/03/18  -CS        User Key  (r) = Recorded By, (t) = Taken By, (c) = Cosigned By    Initials Name Provider Type    CS Teresa Castellon OTR/L Occupational Therapist        Therapy Suggested Charges     Code   Minutes Charges    None           Therapy Charges for Today     Code Description Service Date Service Provider Modifiers Qty    12780389272 HC OT SELFCARE CURRENT 7/3/2018 TAURUS Hall/L GO,  1    24008185974 HC OT SELFCARE PROJECTED 7/3/2018 Teresa Castellon OTR/L GO,  1    72580886408 HC OT SELFCARE DISCHARGE 7/3/2018 Teresa Castellon OTR/L GO,  1    56765494166 HC OT EVAL LOW COMPLEXITY 3 7/3/2018 TAURUS Hall/L GO, KX 1          OT G-codes  OT Professional Judgement Used?: Yes  OT Functional  Scales Options: AM-PAC 6 Clicks Daily Activity (OT)  Score: 24  Functional Limitation: Self care  Self Care Current Status (): 0 percent impaired, limited or restricted  Self Care Goal Status (): 0 percent impaired, limited or restricted  Self Care Discharge Status (): 0 percent impaired, limited or restricted    OT Discharge Summary  Anticipated Discharge Disposition (OT): home with assist  Reason for Discharge: At baseline function  Outcomes Achieved: Other (1x OT evaluation)  Discharge Destination: Home with assist    Teresa Castellon OTR/PRATIBHA  7/3/2018

## 2018-07-03 NOTE — PROGRESS NOTES
Discharge Planning Assessment  [facility]     Patient Name: Cristiane Harris  MRN: [id number]  Today's Date: 7/3/2018    Admit Date: 7/2/2018          Discharge Needs Assessment     Row Name 07/03/18 1522       Living Environment    Lives With alone;child(alicia), adult    Name(s) of Who Lives With Patient Dennis Harris -son    Current Living Arrangements home/apartment/condo    Primary Care Provided by self;child(alicia)    Provides Primary Care For no one    Family Caregiver if Needed child(alicia), adult    Quality of Family Relationships supportive;helpful;involved    Able to Return to Prior Arrangements yes       Resource/Environmental Concerns    Resource/Environmental Concerns none    Transportation Concerns car, none       Transition Planning    Patient/Family Anticipates Transition to home with help/services;home with family    Patient/Family Anticipated Services at Transition ;durable medical equipment;home health care    Transportation Anticipated car, drives self;family or friend will provide       Discharge Needs Assessment    Readmission Within the Last 30 Days no previous admission in last 30 days    Equipment Currently Used at Home walker, standard    Anticipated Changes Related to Illness none    Equipment Needed After Discharge cane, straight    Outpatient/Agency/Support Group Needs homecare agency    Discharge Facility/Level of Care Needs home with home health    Discharge Coordination/Progress Spoke to patient, patient's daughter and patient's son regarding discharge plan/needs.  Patient usually resides alone but recently had knee replacement surgery and has been staying with her son.  Patient's son advises patient was receiving HH services from [facility].  SW did inform [facility] of patient's admission.  They will also need to be notified of patient's discharge.  Son advised HH therapist had recommended a cane for patient and son stated it wasn't a straight cane or a quad  cane.  SW inquired with HH and they stated they would have to get back with us after speaking with therapist.              Discharge Plan    No documentation.       Destination     No service coordination in this encounter.      Durable Medical Equipment     No service coordination in this encounter.      Dialysis/Infusion     No service coordination in this encounter.      Home Medical Care     No service coordination in this encounter.      Social Care     No service coordination in this encounter.                Demographic Summary    No documentation.           Functional Status    No documentation.           Psychosocial    No documentation.           Abuse/Neglect    No documentation.           Legal    No documentation.           Substance Abuse    No documentation.           Patient Forms    No documentation.         MATEUS Wheeler

## 2018-07-03 NOTE — PLAN OF CARE
Problem: Patient Care Overview  Goal: Plan of Care Review  Outcome: Ongoing (interventions implemented as appropriate)   07/03/18 1211   OTHER   Outcome Summary NPO. Await diet advancement. Cont to follow.

## 2018-07-03 NOTE — H&P
[facility] Medicine Services  HISTORY AND PHYSICAL    Date of Admission: 7/2/2018  Primary Care Physician: Julius Daniel MD    Subjective     Chief Complaint: Acute anemia    History of Present Illness  88-year-old female with past medical history of hypertension, squamous cell carcinoma, carotid stenosis and right knee arthroplasty done in June 20 comes into the ER with complaints of diarrhea and vomiting.  Patient states this started 2 days ago and progressively getting worse.  Patient denies any blood on her stool or her emesis.  Patient denies any sick contacts or recent travel history.  Patient has been on aspirin since her surgery on June 20.  Patient denies any other acute symptoms.  In the ER patient's hemoglobin was noted to be 7.4.  Hypertension was noted to be 2.6.  Patient denies any history of potassium problem in the past.  Patient will be admitted for further evaluation and treatment.    In the ER patient's fecal occult blood test was positive.  Family states patient had a colonoscopy a few months ago which was negative for any findings.        Review of Systems     Otherwise complete ROS reviewed and negative except as mentioned in the HPI.    Past Medical History:   Past Medical History:   Diagnosis Date   • Hypertension    • Skin cancer     squamous cell   • Stenosis of carotid artery      Past Surgical History:  Past Surgical History:   Procedure Laterality Date   • BREAST SURGERY  2011   • CAROTID ENDARTERECTOMY Left    • OTHER SURGICAL HISTORY      Facial Surgery - Skin cancer removed   • THROMBOENDARTERECTOMY Right 11/20/2012    carotid   • TUMOR REMOVAL      GASTROINTESTINAL STOMA TUMOR     Social History:  reports that she has never smoked. She has never used smokeless tobacco. She reports that she does not drink alcohol or use drugs.    Family History: family history includes Aneurysm in her father; Cancer in her mother and sister; Coronary artery disease  "in her father; Diabetes in her father.      Allergies:  No Known Allergies  Medications:  Prior to Admission medications    Medication Sig Start Date End Date Taking? Authorizing Provider   aspirin 81 MG tablet Take 81 mg by mouth Daily. 9/26/13   Historical Provider, MD   diltiaZEM CD (CARDIZEM CD) 120 MG 24 hr capsule Take 1 capsule by mouth Daily for 360 days. 6/5/18 5/31/19  Matt Espinoza IV, MD   dorzolamide (TRUSOPT) 2 % ophthalmic solution Administer 1 drop to both eyes 2 (Two) Times a Day. 5/21/18   Historical Provider, MD   esomeprazole (nexIUM) 40 MG capsule Take 40 mg by mouth 2 (Two) Times a Day.    Historical Provider, MD   Glucosamine HCl (GLUCOSAMINE PO) Take 1 tablet by mouth Daily.    Historical Provider, MD   imatinib (GLEEVEC) 400 MG chemo tablet Take 200 mg by mouth daily. 10/22/15   Historical Provider, MD   Multiple Vitamins-Minerals (MULTIVITAMIN PO) Take 1 tablet by mouth Daily.    Historical Provider, MD   omeprazole (PriLOSEC) 20 MG capsule Take 20 mg by mouth Daily. 5/20/13   Historical Provider, MD   pentosan polysulfate (ELMIRON) 100 MG capsule Take  by mouth 2 (two) times a day. 5/20/13   Historical Provider, MD   simvastatin (ZOCOR) 20 MG tablet Take 20 mg by mouth Every Night. 10/22/15   Historical Provider, MD   telmisartan (MICARDIS) 40 MG tablet Take 40 mg by mouth Daily. 5/20/13   Historical Provider, MD   timolol (TIMOPTIC) 0.5 % ophthalmic solution Administer 1 drop to both eyes 2 (Two) Times a Day. 5/21/18   Historical Provider, MD   TRAMADOL HCL PO Take 1 tablet by mouth As Needed. 10/22/15   Historical Provider, MD   TRAVATAN Z 0.004 % solution ophthalmic solution Administer 1 drop to both eyes Daily. 5/21/18   Historical Provider, MD     Objective     Vital Signs: /59   Pulse 97   Temp 100.1 °F (37.8 °C) (Temporal Artery )   Resp 20   Ht 157.5 cm (62\")   Wt 51.2 kg (112 lb 14.4 oz)   SpO2 100%   BMI 20.65 kg/m²   Physical Exam   Constitutional: She " appears well-developed and well-nourished.   HENT:   Head: Normocephalic and atraumatic.   Eyes: EOM are normal. Pupils are equal, round, and reactive to light.   Neck: Normal range of motion. Neck supple.   Cardiovascular: Normal rate, regular rhythm and normal heart sounds.    Pulmonary/Chest: Effort normal and breath sounds normal.   Abdominal: Soft.   Musculoskeletal: Normal range of motion.   Neurological: She is alert.   Skin: Skin is warm. Capillary refill takes less than 2 seconds.   Psychiatric: She has a normal mood and affect. Her behavior is normal.             Results Reviewed:  Lab Results (last 24 hours)     Procedure Component Value Units Date/Time    CBC & Differential [[id number]] Collected:  07/02/18 1833    Specimen:  Blood Updated:  07/02/18 1921    Narrative:       The following orders were created for panel order CBC & Differential.  Procedure                               Abnormality         Status                     ---------                               -----------         ------                     Manual Differential[[id number]]          Abnormal            Final result               CBC Auto Differential[[id number]]        Abnormal            Final result                 Please view results for these tests on the individual orders.    CBC Auto Differential [[id number]]  (Abnormal) Collected:  07/02/18 1833    Specimen:  Blood Updated:  07/02/18 1921     WBC 4.72 (L) 10*3/mm3      RBC 2.34 (L) 10*6/mm3      Hemoglobin 7.3 (L) g/dL      Hematocrit 22.3 (L) %      MCV 95.3 fL      MCH 31.2 pg      MCHC 32.7 (L) g/dL      RDW 14.2 %      RDW-SD 49.0 fl      MPV 9.6 fL      Platelets 328 10*3/mm3     Manual Differential [[id number]]  (Abnormal) Collected:  07/02/18 1833    Specimen:  Blood Updated:  07/02/18 1921     Neutrophil % 48.3 %      Lymphocyte % 10.3 (L) %      Monocyte % 8.0 %      Basophil % 1.1 %      Bands %  28.7 (H) %      Metamyelocyte % 1.1 (H) %      Myelocyte % 2.3 (H) %       Neutrophils Absolute 3.63 10*3/mm3      Lymphocytes Absolute 0.49 (L) 10*3/mm3      Monocytes Absolute 0.38 10*3/mm3      Basophils Absolute 0.05 10*3/mm3      Hypochromia Slight/1+     Polychromasia Slight/1+     WBC Morphology Normal     Giant Platelets Mod/2+    Blood Culture - Blood, [[id number]] Collected:  07/02/18 1833    Specimen:  Blood from Arm, Left Updated:  07/02/18 1902    Comprehensive Metabolic Panel [[id number]]  (Abnormal) Collected:  07/02/18 1833    Specimen:  Blood Updated:  07/02/18 1901     Glucose 114 (H) mg/dL      BUN 14 mg/dL      Creatinine 0.76 mg/dL      Sodium 137 mmol/L      Potassium 2.6 (C) mmol/L      Chloride 104 mmol/L      CO2 20.0 (L) mmol/L      Calcium 8.3 (L) mg/dL      Total Protein 5.2 (L) g/dL      Albumin 2.70 (L) g/dL      ALT (SGPT) 28 U/L      AST (SGOT) 32 U/L      Alkaline Phosphatase 46 U/L      Total Bilirubin 0.2 mg/dL      eGFR Non African Amer 72 mL/min/1.73      Globulin 2.5 gm/dL      A/G Ratio 1.1 g/dL      BUN/Creatinine Ratio 18.4     Anion Gap 13.0 mmol/L     Narrative:       The MDRD GFR formula is only valid for adults with stable renal function between ages 18 and 70.    Lactic Acid, Plasma [[id number]]  (Normal) Collected:  07/02/18 1833    Specimen:  Blood Updated:  07/02/18 1900     Lactate 0.9 mmol/L     Lipase [[id number]]  (Abnormal) Collected:  07/02/18 1833    Specimen:  Blood Updated:  07/02/18 1859     Lipase <10 (L) U/L     Protime-INR [[id number]]  (Abnormal) Collected:  07/02/18 1833    Specimen:  Blood Updated:  07/02/18 1856     Protime 17.3 (H) Seconds      INR 1.37 (H)        Imaging Results (last 24 hours)     Procedure Component Value Units Date/Time    XR Abdomen KUB [[id number]] Collected:  07/02/18 1812     Updated:  07/02/18 1816    Narrative:       XR ABDOMEN KUB- 7/2/2018 5:56 PM CDT     HISTORY: diarrhea       COMPARISON: None     FINDINGS:  There is a nonobstructive bowel gas pattern. Moderate colonic stool with  mild dilatation of  the ascending colon up to 6.3 cm. Small bowel appears  decompressed. Extensive calcification of the costal cartilages. Splenic  artery calcification. Suture material noted in the left upper quadrant.  Evaluation for free intraperitoneal air is limited on a supine  radiograph, but there are no definite findings of free intraperitoneal  air.      The osseous structures demonstrate no acute abnormality. 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       Impression:       1. No radiographic evidence of acute abdominopelvic process. Bowel gas  pattern is nonobstructive.  2. Moderate colonic stool with mild dilatation of the ascending colon up  to 6.3 cm.        This report was finalized on 07/02/2018 18:13 by Dr Sandip Fox, .        I have personally reviewed and interpreted the radiology studies and ECG obtained at time of admission.     Assessment / Plan     Assessment:   Hospital Problem List     Anemia        1.  Acute anemia possibly secondary to GI bleed  2.  Hypokalemia  3.  Leukopenia  4.  Hyperlipidemia    Plan:      -Admit for further evaluation  -Continue cardiac monitoring  -Continuous pulse ox  -Keep nothing by mouth for now  -Monitor H&H  -Transfuse if needed  -Follow-up GI consult  -Continue Protonix drip  -Replete potassium as needed  -Monitor electrolytes  -Follow-up a.m. WBC  -Follow-up urinalysis and chest x-ray  -No indication for antibiotic at the moment  -GI prophylaxis  -DVT prophylaxis-pneumatic compression only          Code Status: Full code     I discussed the patient's findings and my recommendations with the patient's RN    Estimated length of stay 2-3 days    Mayela Hartmann MD   07/02/18   7:59 PM

## 2018-07-03 NOTE — PLAN OF CARE
Problem: Patient Care Overview  Goal: Plan of Care Review  Outcome: Ongoing (interventions implemented as appropriate)   07/03/18 1327   Coping/Psychosocial   Plan of Care Reviewed With patient   Plan of Care Review   Progress no change   OTHER   Outcome Summary OT evaluation completed. Pt demo no further need for skilled OT services due to pt completing LB dressing with S/independently and is able to complete functional mobility to BS with supervision/independent. Pt reports feeling at her baseline as she was at home as far as her functional status. OT will sign off at this time due to pt being at her baseline. It is recommended for pt to discharge home with her family upon hospital discharge.

## 2018-07-03 NOTE — ANESTHESIA PREPROCEDURE EVALUATION
Anesthesia Evaluation     Patient summary reviewed   no history of anesthetic complications:  NPO Solid Status: > 8 hours  NPO Liquid Status: > 8 hours           Airway   Mallampati: I  TM distance: >3 FB  Neck ROM: full  Dental - normal exam         Pulmonary - normal exam    breath sounds clear to auscultation  (-) asthma, recent URI, sleep apnea, not a smoker  Cardiovascular - normal exam  Exercise tolerance: good (4-7 METS)    ECG reviewed  Rhythm: irregular  Rate: normal    (+) hypertension well controlled, hyperlipidemia,  carotid artery disease (CEA 7 yrs ago)  (-) pacemaker, past MI, angina, cardiac stents, CABG      Neuro/Psych  (-) seizures, TIA, CVA  GI/Hepatic/Renal/Endo    (+)  GERD,    (-) liver disease, no renal disease, diabetes, hypothyroidism, hyperthyroidism    Musculoskeletal     Abdominal    Substance History      OB/GYN          Other                      Anesthesia Plan    ASA 3     general   total IV anesthesia  intravenous induction   Anesthetic plan and risks discussed with patient.

## 2018-07-03 NOTE — ANESTHESIA POSTPROCEDURE EVALUATION
Patient: Cristiane aHrris    Procedure Summary     Date:  07/03/18 Room / Location:  [facility] ENDOSCOPY 2 / [facility]    Anesthesia Start:  1225 Anesthesia Stop:  1240    Procedure:  ESOPHAGOGASTRODUODENOSCOPY WITH ANESTHESIA (N/A ) Diagnosis:       Anemia, unspecified type      Nausea and vomiting, intractability of vomiting not specified, unspecified vomiting type      (Anemia, unspecified type [D64.9])      (Nausea and vomiting, intractability of vomiting not specified, unspecified vomiting type [R11.2])    Surgeon:  Latoya Dozier MD Provider:  Alonzo Torres CRNA    Anesthesia Type:  general ASA Status:  3          Anesthesia Type: general  Last vitals  BP   113/61 (07/03/18 1105)   Temp   98.4 °F (36.9 °C) (07/03/18 0750)   Pulse   89 (07/03/18 1105)   Resp   18 (07/03/18 0750)     SpO2   99 % (07/03/18 1105)     Post Anesthesia Care and Evaluation    Patient location during evaluation: PHASE II  Level of consciousness: awake and alert  Pain management: adequate  Airway patency: patent  Anesthetic complications: No anesthetic complications    Cardiovascular status: acceptable  Respiratory status: acceptable  Hydration status: acceptable

## 2018-07-04 LAB
ANION GAP SERPL CALCULATED.3IONS-SCNC: 11 MMOL/L (ref 4–13)
BUN BLD-MCNC: 11 MG/DL (ref 5–21)
BUN/CREAT SERPL: 15.1 (ref 7–25)
CALCIUM SPEC-SCNC: 8.3 MG/DL (ref 8.4–10.4)
CHLORIDE SERPL-SCNC: 109 MMOL/L (ref 98–110)
CO2 SERPL-SCNC: 18 MMOL/L (ref 24–31)
CREAT BLD-MCNC: 0.73 MG/DL (ref 0.5–1.4)
DEPRECATED RDW RBC AUTO: 50.9 FL (ref 40–54)
ERYTHROCYTE [DISTWIDTH] IN BLOOD BY AUTOMATED COUNT: 14.5 % (ref 12–15)
GFR SERPL CREATININE-BSD FRML MDRD: 75 ML/MIN/1.73
GLUCOSE BLD-MCNC: 77 MG/DL (ref 70–100)
HCT VFR BLD AUTO: 21.7 % (ref 37–47)
HCT VFR BLD AUTO: 22.7 % (ref 37–47)
HCT VFR BLD AUTO: 23.9 % (ref 37–47)
HGB BLD-MCNC: 7 G/DL (ref 12–16)
HGB BLD-MCNC: 7.4 G/DL (ref 12–16)
HGB BLD-MCNC: 7.6 G/DL (ref 12–16)
MCH RBC QN AUTO: 31.1 PG (ref 28–32)
MCHC RBC AUTO-ENTMCNC: 32.3 G/DL (ref 33–36)
MCV RBC AUTO: 96.4 FL (ref 82–98)
PLATELET # BLD AUTO: 324 10*3/MM3 (ref 130–400)
PMV BLD AUTO: 9.8 FL (ref 6–12)
POTASSIUM BLD-SCNC: 2.8 MMOL/L (ref 3.5–5.3)
POTASSIUM BLD-SCNC: 3.1 MMOL/L (ref 3.5–5.3)
RBC # BLD AUTO: 2.25 10*6/MM3 (ref 4.2–5.4)
SODIUM BLD-SCNC: 138 MMOL/L (ref 135–145)
WBC NRBC COR # BLD: 4.05 10*3/MM3 (ref 4.8–10.8)

## 2018-07-04 PROCEDURE — 25810000003 SODIUM CHLORIDE 0.9 % WITH KCL 20 MEQ 20-0.9 MEQ/L-% SOLUTION: Performed by: NURSE PRACTITIONER

## 2018-07-04 PROCEDURE — 94799 UNLISTED PULMONARY SVC/PX: CPT

## 2018-07-04 PROCEDURE — 84132 ASSAY OF SERUM POTASSIUM: CPT | Performed by: NURSE PRACTITIONER

## 2018-07-04 PROCEDURE — 99232 SBSQ HOSP IP/OBS MODERATE 35: CPT | Performed by: INTERNAL MEDICINE

## 2018-07-04 PROCEDURE — 80048 BASIC METABOLIC PNL TOTAL CA: CPT | Performed by: NURSE PRACTITIONER

## 2018-07-04 PROCEDURE — 25010000002 IRON SUCROSE PER 1 MG: Performed by: NURSE PRACTITIONER

## 2018-07-04 PROCEDURE — 97161 PT EVAL LOW COMPLEX 20 MIN: CPT | Performed by: PHYSICAL THERAPIST

## 2018-07-04 PROCEDURE — 25010000003 POTASSIUM CHLORIDE 10 MEQ/100ML SOLUTION: Performed by: FAMILY MEDICINE

## 2018-07-04 PROCEDURE — 85014 HEMATOCRIT: CPT | Performed by: NURSE PRACTITIONER

## 2018-07-04 PROCEDURE — G8979 MOBILITY GOAL STATUS: HCPCS | Performed by: PHYSICAL THERAPIST

## 2018-07-04 PROCEDURE — 85027 COMPLETE CBC AUTOMATED: CPT | Performed by: INTERNAL MEDICINE

## 2018-07-04 PROCEDURE — 97110 THERAPEUTIC EXERCISES: CPT | Performed by: PHYSICAL THERAPIST

## 2018-07-04 PROCEDURE — 94760 N-INVAS EAR/PLS OXIMETRY 1: CPT

## 2018-07-04 PROCEDURE — 85018 HEMOGLOBIN: CPT | Performed by: NURSE PRACTITIONER

## 2018-07-04 PROCEDURE — G8978 MOBILITY CURRENT STATUS: HCPCS | Performed by: PHYSICAL THERAPIST

## 2018-07-04 RX ORDER — POTASSIUM CHLORIDE 20MEQ/15ML
40 LIQUID (ML) ORAL ONCE
Status: COMPLETED | OUTPATIENT
Start: 2018-07-05 | End: 2018-07-05

## 2018-07-04 RX ORDER — SODIUM CHLORIDE AND POTASSIUM CHLORIDE 150; 900 MG/100ML; MG/100ML
50 INJECTION, SOLUTION INTRAVENOUS CONTINUOUS
Status: DISCONTINUED | OUTPATIENT
Start: 2018-07-04 | End: 2018-07-06

## 2018-07-04 RX ORDER — POTASSIUM CHLORIDE 7.45 MG/ML
10 INJECTION INTRAVENOUS
Status: COMPLETED | OUTPATIENT
Start: 2018-07-04 | End: 2018-07-04

## 2018-07-04 RX ADMIN — LATANOPROST 1 DROP: 50 SOLUTION OPHTHALMIC at 21:43

## 2018-07-04 RX ADMIN — DILTIAZEM HYDROCHLORIDE 120 MG: 120 CAPSULE, COATED, EXTENDED RELEASE ORAL at 08:48

## 2018-07-04 RX ADMIN — ATORVASTATIN CALCIUM 10 MG: 10 TABLET, FILM COATED ORAL at 21:43

## 2018-07-04 RX ADMIN — POTASSIUM CHLORIDE 10 MEQ: 7.46 INJECTION, SOLUTION INTRAVENOUS at 13:28

## 2018-07-04 RX ADMIN — PANTOPRAZOLE SODIUM 40 MG: 40 TABLET, DELAYED RELEASE ORAL at 06:32

## 2018-07-04 RX ADMIN — PENTOSAN POLYSULFATE SODIUM 100 MG: 100 CAPSULE, GELATIN COATED ORAL at 18:37

## 2018-07-04 RX ADMIN — POTASSIUM CHLORIDE 10 MEQ: 7.46 INJECTION, SOLUTION INTRAVENOUS at 09:59

## 2018-07-04 RX ADMIN — VANCOMYCIN HYDROCHLORIDE 125 MG: KIT at 06:32

## 2018-07-04 RX ADMIN — TIMOLOL MALEATE 1 DROP: 5 SOLUTION/ DROPS OPHTHALMIC at 21:43

## 2018-07-04 RX ADMIN — VANCOMYCIN HYDROCHLORIDE 125 MG: KIT at 18:37

## 2018-07-04 RX ADMIN — POTASSIUM CHLORIDE AND SODIUM CHLORIDE 50 ML/HR: 900; 150 INJECTION, SOLUTION INTRAVENOUS at 08:42

## 2018-07-04 RX ADMIN — DORZOLAMIDE HYDROCHLORIDE 1 DROP: 20 SOLUTION OPHTHALMIC at 21:43

## 2018-07-04 RX ADMIN — POTASSIUM CHLORIDE 10 MEQ: 7.46 INJECTION, SOLUTION INTRAVENOUS at 08:49

## 2018-07-04 RX ADMIN — PENTOSAN POLYSULFATE SODIUM 100 MG: 100 CAPSULE, GELATIN COATED ORAL at 06:32

## 2018-07-04 RX ADMIN — DORZOLAMIDE HYDROCHLORIDE 1 DROP: 20 SOLUTION OPHTHALMIC at 08:52

## 2018-07-04 RX ADMIN — VANCOMYCIN HYDROCHLORIDE 125 MG: KIT at 11:56

## 2018-07-04 RX ADMIN — IRON SUCROSE 300 MG: 20 INJECTION, SOLUTION INTRAVENOUS at 09:59

## 2018-07-04 RX ADMIN — TIMOLOL MALEATE 1 DROP: 5 SOLUTION/ DROPS OPHTHALMIC at 08:48

## 2018-07-04 RX ADMIN — POTASSIUM CHLORIDE 10 MEQ: 7.46 INJECTION, SOLUTION INTRAVENOUS at 06:33

## 2018-07-04 NOTE — PLAN OF CARE
Problem: Patient Care Overview  Goal: Plan of Care Review  Outcome: Ongoing (interventions implemented as appropriate)   07/04/18 1552   Coping/Psychosocial   Plan of Care Reviewed With patient   Plan of Care Review   Progress improving   OTHER   Outcome Summary NO C/O'S PAIN VOICED THIS SHIFT. PT. CONT. TO HAVE DIARRHEA. UP WITH ASSISTANCE TO BSC. IV FLUIDS INFUSING. K+ = 2.8  K+ RUNS GIVEN TODAY. HGB = 7.4 DIET ADVANCED TO REGULAR DIET TODAY. TOLERATED OK. NO DISTRESS NOTED.       Problem: Fall Risk (Adult)  Goal: Identify Related Risk Factors and Signs and Symptoms  Outcome: Ongoing (interventions implemented as appropriate)      Problem: Skin Injury Risk (Adult)  Goal: Identify Related Risk Factors and Signs and Symptoms  Outcome: Ongoing (interventions implemented as appropriate)

## 2018-07-04 NOTE — PLAN OF CARE
Problem: Patient Care Overview  Goal: Plan of Care Review  Outcome: Ongoing (interventions implemented as appropriate)  ivf cont. Denies pain. Still with liquid green stools. Tolerated little of full liq diet. Good mobility. mepilex to right knee incision. Cont to monitor.   07/04/18 0239   Coping/Psychosocial   Plan of Care Reviewed With patient   Plan of Care Review   Progress no change     Goal: Individualization and Mutuality  Outcome: Ongoing (interventions implemented as appropriate)    Goal: Discharge Needs Assessment  Outcome: Ongoing (interventions implemented as appropriate)      Problem: Fall Risk (Adult)  Goal: Absence of Fall  Outcome: Ongoing (interventions implemented as appropriate)      Problem: Skin Injury Risk (Adult)  Goal: Skin Health and Integrity  Outcome: Ongoing (interventions implemented as appropriate)

## 2018-07-04 NOTE — PROGRESS NOTES
[facility] Gastroenterology  Inpatient Progress Note  Today's date:  07/04/18    Cristiane Harris  11/13/1929       Reason for Follow Up:  Diarrhea positive for C. Difficile and Escherichia coli/anemia/ bleeding nodule on EGD    Subjective: The patient is sitting up to bed this morning.  She tells me that she ate her full breakfast she denies any nausea or vomiting or abdominal pain with this.  She tells me that she has had approximately 13 bowel movements since yesterday evening and throughout the night.  She denies any bowel movements diarrhea this morning. The patient denies any nausea, vomiting, epigastric pain, dysphagia, pyrosis or hematemesis.  The patient denies any fever or chills.  Denies any melena or hematochezia.  Denies any unintentional weight loss or loss of appetite.    EGD performed on 7/3/18  Findings:  The esophagus was normal.  A single 13 mm papule (nodule) with contact bleeding was found at the anastomosis. Biopsies were taken with a cold forceps for histology. It bled more after the biopsy. To control bleeding after the biopsy, two hemostatic clips were successfully placed (MR conditional). There was no bleeding at the end of the maneuver. The patient has had a prior Whipple procedure. The anastomosis lead directly into the jejunum. The mucosa was normal.    No Known Allergies    Current Facility-Administered Medications:   •  atorvastatin (LIPITOR) tablet 10 mg, 10 mg, Oral, Nightly, Mayela Hartmann MD, 10 mg at 07/03/18 2043  •  diltiaZEM CD (CARDIZEM CD) 24 hr capsule 120 mg, 120 mg, Oral, Daily, Mayela Hartmann MD, 120 mg at 07/04/18 0848  •  dorzolamide (TRUSOPT) 2 % ophthalmic solution 1 drop, 1 drop, Both Eyes, BID, EARNEST Santos, 1 drop at 07/04/18 0852  •  iron sucrose (VENOFER) 300 mg in sodium chloride 0.9 % 100 mL IVPB, 300 mg, Intravenous, Q24H, EARNEST Santos, 300 mg at 07/04/18 0959  •  latanoprost (XALATAN) 0.005 % ophthalmic solution 1 drop, 1  drop, Both Eyes, Nightly, EARNEST Santos, 1 drop at 07/03/18 2044  •  ondansetron (ZOFRAN) injection 4 mg, 4 mg, Intravenous, Q6H PRN, Mayela Hartmann MD, 4 mg at 07/02/18 2335  •  pantoprazole (PROTONIX) EC tablet 40 mg, 40 mg, Oral, Q AM, Latoya Dozier MD, 40 mg at 07/04/18 0632  •  pentosan polysulfate (ELMIRON) capsule 100 mg, 100 mg, Oral, BID AC, Mayela Hartmann MD, 100 mg at 07/04/18 0632  •  Pharmacy Consult, , Does not apply, Continuous PRN, EARNEST Santos  •  potassium chloride 10 mEq in 100 mL IVPB, 10 mEq, Intravenous, Q1H, Mayela Hartmann MD, Last Rate: 100 mL/hr at 07/04/18 0959, 10 mEq at 07/04/18 0959  •  sodium chloride 0.9 % flush 1-10 mL, 1-10 mL, Intravenous, PRN, Mayela Hartmann MD  •  Insert peripheral IV, , , Once **AND** sodium chloride 0.9 % flush 10 mL, 10 mL, Intravenous, PRN, Nicko Seth MD  •  sodium chloride 0.9 % with KCl 20 mEq/L infusion, 50 mL/hr, Intravenous, Continuous, EARNEST Santos, Last Rate: 50 mL/hr at 07/04/18 0842, 50 mL/hr at 07/04/18 0842  •  timolol (TIMOPTIC) 0.5 % ophthalmic solution 1 drop, 1 drop, Both Eyes, Q12H, Ministerio Terrell MD, 1 drop at 07/04/18 0848  •  vancomycin oral solution 125 mg, 125 mg, Oral, Q6H, Ministerio Terrell MD, 125 mg at 07/04/18 0632    Review of Systems:   Review of Systems   Constitutional: Negative for fever and unexpected weight change.   HENT: Negative for hearing loss.    Eyes: Negative for visual disturbance.   Respiratory: Negative for cough.    Cardiovascular: Negative for chest pain.   Gastrointestinal:        See HPI   Endocrine: Negative for cold intolerance and heat intolerance.   Genitourinary: Negative for dysuria.   Musculoskeletal: Negative for arthralgias.   Skin: Negative for rash.   Neurological: Negative for seizures.   Psychiatric/Behavioral: Negative for hallucinations.        Vital Signs:  Temp:  [97.5 °F (36.4 °C)-98.6 °F (37 °C)] 98.6 °F (37 °C)  Heart Rate:  [60-90]  90  Resp:  [16] 16  BP: (113-139)/(51-68) 138/60  Body mass index is 17.89 kg/m².     Intake/Output Summary (Last 24 hours) at 07/04/18 1030  Last data filed at 07/04/18 0959   Gross per 24 hour   Intake             1830 ml   Output                0 ml   Net             1830 ml     I/O this shift:  In: 1300 [I.V.:1000; IV Piggyback:300]  Out: -   Physical Exam:  Physical Exam   Constitutional: She appears well-developed.   Cardiovascular: Normal rate and regular rhythm.    Pulmonary/Chest: Effort normal.   Abdominal: Soft. Bowel sounds are normal.   Neurological: She is alert.   Psychiatric: She has a normal mood and affect.     I have performed the physical exam and agree with the findings as noted above.   RAMON Dozier MD      Results Review:   I have reviewed all of the patient's current test results      Results from last 7 days  Lab Units 07/04/18  0747 07/04/18  0512 07/03/18  1949  07/03/18  0025 07/02/18  1833   WBC 10*3/mm3  --  4.05*  --   --  4.11* 4.72*   HEMOGLOBIN g/dL 7.4* 7.0* 7.1*  < > 7.1* 7.3*   HEMATOCRIT % 22.7* 21.7* 22.0*  < > 21.3* 22.3*   PLATELETS 10*3/mm3  --  324  --   --  308 328   < > = values in this interval not displayed.      Results from last 7 days  Lab Units 07/04/18  0512 07/03/18  0025 07/02/18  1833   SODIUM mmol/L 138 136 137   POTASSIUM mmol/L 2.8* 3.4* 2.6*   CHLORIDE mmol/L 109 106 104   CO2 mmol/L 18.0* 20.0* 20.0*   BUN mg/dL 11 14 14   CREATININE mg/dL 0.73 0.76 0.76   CALCIUM mg/dL 8.3* 8.0* 8.3*   BILIRUBIN mg/dL  --  0.2 0.2   ALK PHOS U/L  --  44 46   ALT (SGPT) U/L  --  32 28   AST (SGOT) U/L  --  35 32   GLUCOSE mg/dL 77 110* 114*         Results from last 7 days  Lab Units 07/02/18  1833   INR  1.37*         Impression/Plan:  Patient Active Problem List   Diagnosis Code   • Essential hypertension I10   • Atherosclerosis of both carotid arteries I65.23   • Chest pain syndrome R07.9   • Hyperlipidemia LDL goal <100 E78.5   • Bradycardia on ECG R00.1   • Preoperative  cardiovascular examination Z01.810   • Anemia D64.9   • Nausea and vomiting R11.2     C difficile diarrhea  She is up to date on colonoscopy.  No plans to repeat at this stage.The patient was found to have C. difficile along with Escherichia coli.  She is currently being treated with vancomycin. OK to advance diet.      Heme positive stool/anemia  Hemoglobin is in the low 7 range.  EGD as noted above with findings of bleeding papule in stomach that was clipped.  Pathology is still pending.  The patient is receiving IV Venofer.  Hemoglobin remains the same.      Chronic NSAID use    EARNEST Valenzuela  07/04/18  10:30 AM     Patient Seen, Chart, Consults, Notes, Labs, Radiology studies reviewed    Latoya Dozier MD  [facility] Gastroenter[facility]  07/04/18  10:43 AM    Much of this encounter note is an electronic transcription/translation of spoken language to printed text. The electronic translation of spoken language may permit erroneous, or at times, nonsensical words or phrases to be inadvertently transcribed; although I have reviewed the note for such errors, some may still exist.

## 2018-07-04 NOTE — THERAPY EVALUATION
Acute Care - Physical Therapy Initial Evaluation  [facility]     Patient Name: Cristiane Harris  : 1929  MRN: [id number]  Today's Date: 2018   Onset of Illness/Injury or Date of Surgery: 18  Date of Referral to PT: 18  Referring Physician: BERNARDO Chavarria      Admit Date: 2018    Visit Dx:     ICD-10-CM ICD-9-CM   1. Anemia, unspecified type D64.9 285.9   2. Gastrointestinal hemorrhage, unspecified gastrointestinal hemorrhage type K92.2 578.9   3. Hypokalemia E87.6 276.8   4. Diarrhea, unspecified type R19.7 787.91   5. Nausea and vomiting, intractability of vomiting not specified, unspecified vomiting type R11.2 787.01   6. Dilatation of colon K59.39 564.7   7. Impaired mobility and ADLs Z74.09 799.89     Patient Active Problem List   Diagnosis   • Essential hypertension   • Atherosclerosis of both carotid arteries   • Chest pain syndrome   • Hyperlipidemia LDL goal <100   • Bradycardia on ECG   • Preoperative cardiovascular examination   • Anemia   • Nausea and vomiting     Past Medical History:   Diagnosis Date   • Anemia    • Arthritis    • GERD (gastroesophageal reflux disease)    • GIST (gastrointestinal stromal tumor), non-malignant    • Hyperlipemia    • Hypertension    • Skin cancer     squamous cell   • Stenosis of carotid artery      Past Surgical History:   Procedure Laterality Date   • BREAST SURGERY     • CAROTID ENDARTERECTOMY Left    • CATARACT EXTRACTION W/ INTRAOCULAR LENS IMPLANT     • ENDOSCOPY N/A 7/3/2018    Procedure: ESOPHAGOGASTRODUODENOSCOPY WITH ANESTHESIA;  Surgeon: Latoya Dozier MD;  Location: [facility];  Service: Gastroenterology   • JOINT REPLACEMENT     • OTHER SURGICAL HISTORY      Facial Surgery - Skin cancer removed   • THROMBOENDARTERECTOMY Right 2012    carotid   • TOTAL KNEE ARTHROPLASTY Right    • TUMOR REMOVAL      GASTROINTESTINAL STOMA TUMOR   • WHIPPLE PROCEDURE          PT ASSESSMENT (last 12 hours)      Physical Therapy Evaluation      Row Name 07/04/18 1500          PT Evaluation Time/Intention    Subjective Information complains of;weakness  -TB     Document Type evaluation  -TB     Mode of Treatment physical therapy;individual therapy  -TB     Total Evaluation Minutes, Physical Therapy 30  -TB     Row Name 07/04/18 1500          General Information    Patient Profile Reviewed? yes  -TB     Onset of Illness/Injury or Date of Surgery 06/20/18  -TB     Referring Physician BERNARDO Chavarria  -TB     Patient Observations alert;cooperative;agree to therapy  -TB     Patient/Family Observations drsg intact right knee with no redness or swelling or warmth, IV left arm  -TB     Prior Level of Function independent:;all household mobility;community mobility;gait;transfer;ADL's  -TB     Equipment Currently Used at Home walker, rolling;cane, straight  -TB     Pertinent History of Current Functional Problem She had a TKR Right on 6/20/18. She was doing well before she developed a C diff infection. Her knee continues to do well. She had been getting HH PT.   -TB     Equipment Issued to Patient cane, quad  -TB     Risks Reviewed patient:;family:  -TB     Benefits Reviewed patient and family:  -TB     Barriers to Rehab none identified  -TB     Row Name 07/04/18 1500          Relationship/Environment    Primary Source of Support/Comfort child(alicia)  -TB     Lives With alone;child(alicia), adult  -TB     Row Name 07/04/18 1500          Resource/Environmental Concerns    Current Living Arrangements home/apartment/condo  -TB     Resource/Environmental Concerns none  -TB     Transportation Concerns car, none  -TB     Row Name 07/04/18 1500          Home Main Entrance    Number of Stairs, Main Entrance one  -TB     Row Name 07/04/18 1500          Cognitive Assessment/Intervention- PT/OT    Affect/Mental Status (Cognitive) WNL  -TB     Orientation Status (Cognition) oriented x 4  -TB     Follows Commands (Cognition) WNL  -TB     Cognitive Function (Cognitive) WNL  -TB      Personal Safety Interventions fall prevention program maintained  -TB     Row Name 07/04/18 1500          Bed Mobility Assessment/Treatment    Bed Mobility Assessment/Treatment supine-sit;sit-supine  -TB     Supine-Sit [place] (Bed Mobility) conditional independence  -TB     Sit-Supine [place] (Bed Mobility) conditional independence  -TB     Row Name 07/04/18 1500          Transfer Assessment/Treatment    Transfer Assessment/Treatment sit-stand transfer;stand-sit transfer;toilet transfer  -TB     Sit-Stand [place] (Transfers) conditional independence  -TB     Stand-Sit [place] (Transfers) conditional independence  -TB     Row Name 07/04/18 1500          Gait/Stairs Assessment/Training    [place] Level (Gait) supervision  -TB     Assistive Device (Gait) walker, front-wheeled  -TB     Distance in Feet (Gait) 30  -TB     Pattern (Gait) step-through  -TB     Comment (Gait/Stairs) walked well with a rwx so gave her a cane to use in the room  -TB     Row Name 07/04/18 1500          General ROM    RT Lower Ext Rt Knee Extension/Flexion  -TB     Row Name 07/04/18 1500          Right Lower Ext    Rt Knee Extension/Flexion PROM 5-105 deg  -TB     Row Name 07/04/18 1500          General Assessment (Manual Muscle Testing)    Comment, General Manual Muscle Testing (MMT) Assessment right quad 4/5  -TB     Row Name 07/04/18 1500          Plan of Care Review    Plan of Care Reviewed With patient;family  -TB     Row Name 07/04/18 1500          Physical Therapy Clinical Impression    Date of Referral to PT 07/03/18  -TB     PT Diagnosis (PT Clinical Impression) right TKR  -TB     Prognosis (PT Clinical Impression) good  -TB     Functional Level at Time of Evaluation (PT Clinical Impression) supervision  -TB     Patient/Family Goals Statement (PT Clinical Impression) cont rehab on TKR  -TB     Criteria for Skilled Interventions Met (PT Clinical Impression) yes  -TB     Pathology/Pathophysiology Noted (Describe  Specifically for Each System) musculoskeletal  -TB     Impairments Found (describe specific impairments) ROM;muscle performance;gait, locomotion, and balance  -TB     Functional Limitations in Following Categories (Describe Specific Limitations) self-care;home management  -TB     Rehab Potential (PT Clinical Summary) good, to achieve stated therapy goals  -TB     Predicted Duration of Therapy (PT) LOS  -TB     Care Plan Review (PT) evaluation/treatment results reviewed  -TB     Care Plan Review, Other Participant (PT Clinical Impression) caregiver  -TB     Row Name 07/04/18 1500          Physical Therapy Goals    Gait Training Goal Selection (PT) gait training, PT goal 1  -TB     ROM Goal Selection (PT) ROM, PT goal 1  -TB     Additional Documentation ROM Goal Selection (PT) (Row)  -TB     Row Name 07/04/18 1500          Gait Training Goal 1 (PT)    Activity/Assistive Device (Gait Training Goal 1, PT) gait (walking locomotion);assistive device use;improve balance and speed;increase endurance/gait distance  -TB     Line [place] Level (Gait Training Goal 1, PT) supervision required  -TB     Distance (Gait Goal 1, PT) 300  -TB     Time Frame (Gait Training Goal 1, PT) long term goal (LTG);by discharge  -TB     Barriers (Gait Training Goal 1, PT) none  -TB     Progress/Outcome (Gait Training Goal 1, PT) goal ongoing  -TB     Row Name 07/04/18 1500          ROM Goal 1 (PT)    ROM Goal 1 (PT) Right knee PROM 0-120 deg  -TB     Time Frame (ROM Goal 1, PT) long term goal (LTG);by discharge  -TB     Barriers (ROM Goal 1, PT) phys  -TB     Progress/Outcome (ROM Goal 1, PT) goal ongoing  -TB     Row Name 07/04/18 1500          Positioning and Restraints    Pre-Treatment Position in bed  -TB     Post Treatment Position bed  -TB     In Bed supine;call light within reach;with family/caregiver  -TB     Row Name 07/04/18 1500          Living Environment    Home Accessibility stairs to enter home  -TB       User Key  (r) = Recorded  By, (t) = Taken By, (c) = Cosigned By    Initials Name Provider Type     Zana Mullins PT Physical Therapist          Physical Therapy Education     Title: PT OT SLP Therapies (Active)     Topic: Physical Therapy (Active)     Point: Mobility training (Active)    Learning Progress Summary     Learner Status Readiness Method Response Comment Documented by    Patient Active Eager E NR knee stretches  07/04/18 1606    Family Active Eager E NR knee stretches  07/04/18 1606          Point: Home exercise program (Active)    Learning Progress Summary     Learner Status Readiness Method Response Comment Documented by    Patient Active Eager E NR knee stretches  07/04/18 1606    Family Active Eager E NR knee stretches  07/04/18 1606          Point: Body mechanics (Active)    Learning Progress Summary     Learner Status Readiness Method Response Comment Documented by    Patient Active Eager E NR knee stretches  07/04/18 1606    Family Active Eager E NR knee stretches  07/04/18 1606          Point: Precautions (Active)    Learning Progress Summary     Learner Status Readiness Method Response Comment Documented by    Patient Active Eager E NR knee stretches  07/04/18 1606    Family Active Eager E NR knee stretches  07/04/18 1606                      User Key     Initials Effective Dates Name Provider Type Discipline     06/08/18 -  Zana Mullins PT Physical Therapist PT                PT Recommendation and Plan  Anticipated Discharge Disposition (PT): home with home health  Planned Therapy Interventions (PT Eval): gait training, ROM (range of motion), stretching, strengthening, patient/family education  Therapy Frequency (PT Clinical Impression): 2 times/day  Outcome Summary/Treatment Plan (PT)  Anticipated Discharge Disposition (PT): home with home health  Plan of Care Reviewed With: patient, family  Outcome Summary: PT eval done. She is walking well with a rwx so put a cane in her room. Our primary  focus will be on her right TKR to work on ROM and right LE strenthening.          Outcome Measures     Row Name 07/04/18 1600 07/03/18 1116          How much help from another person do you currently need...    Turning from your back to your side while in flat bed without using bedrails? 4  -TB  --     Moving from lying on back to sitting on the side of a flat bed without bedrails? 4  -TB  --     Moving to and from a bed to a chair (including a wheelchair)? 4  -TB  --     Standing up from a chair using your arms (e.g., wheelchair, bedside chair)? 4  -TB  --     Climbing 3-5 steps with a railing? 3  -TB  --     To walk in hospital room? 3  -TB  --     AM-PAC 6 Clicks Score 22  -TB  --        How much help from another is currently needed...    Putting on and taking off regular lower body clothing?  -- 4  -CS     Bathing (including washing, rinsing, and drying)  -- 4  -CS     Toileting (which includes using toilet bed pan or urinal)  -- 4  -CS     Putting on and taking off regular upper body clothing  -- 4  -CS     Taking care of personal grooming (such as brushing teeth)  -- 4  -CS     Eating meals  -- 4  -CS     Score  -- 24  -CS        Functional Assessment    Outcome Measure Options AM-PAC 6 Clicks Basic Mobility (PT)  -TB AM-PAC 6 Clicks Daily Activity (OT)  -CS       User Key  (r) = Recorded By, (t) = Taken By, (c) = Cosigned By    Initials Name Provider Type    TB Zana Mullins, PT Physical Therapist    CS Teresa Castellon, OTR/L Occupational Therapist           Time Calculation:         PT Charges     Row Name 07/04/18 1608             Time Calculation    Start Time 1500  -TB      Stop Time 1600  -TB      Time Calculation (min) 60 min  -TB      PT Received On 07/04/18  -TB      PT Goal Re-Cert Due Date 07/14/18  -TB         Time Calculation- PT    Total Timed Code Minutes- PT 30 minute(s)  -TB         Timed Charges    25083 - PT Therapeutic Exercise Minutes 30  -TB        User Key  (r) = Recorded By, (t) =  Taken By, (c) = Cosigned By    Initials Name Provider Type    TB Zana Mullins, PT Physical Therapist        Therapy Suggested Charges     Code   Minutes Charges    29777 (CPT®) Hc Pt Neuromusc Re Education Ea 15 Min      73030 (CPT®) Hc Pt Ther Proc Ea 15 Min 30 2    57062 (CPT®) Hc Gait Training Ea 15 Min      38808 (CPT®) Hc Pt Therapeutic Act Ea 15 Min      80857 (CPT®) Hc Pt Manual Therapy Ea 15 Min      46854 (CPT®) Hc Pt Iontophoresis Ea 15 Min      07409 (CPT®) Hc Pt Elec Stim Ea-Per 15 Min      31567 (CPT®) Hc Pt Ultrasound Ea 15 Min      45001 (CPT®) Hc Pt Self Care/Mgmt/Train Ea 15 Min      Total  30 2        Therapy Charges for Today     Code Description Service Date Service Provider Modifiers Qty    18140867930 HC PT MOBILITY CURRENT 7/4/2018 Zana Mullins, PT GP, CJ 1    26082502886 HC PT MOBILITY PROJECTED 7/4/2018 Zana Mullins, PT GP, CI 1    03010029314 HC PT EVAL LOW COMPLEXITY 2 7/4/2018 Zana Mullins, PT GP, KX 1    61424954501 HC PT THER PROC EA 15 MIN 7/4/2018 Zana Mullins, PT GP, KX 2          PT G-Codes  Outcome Measure Options: AM-PAC 6 Clicks Basic Mobility (PT)  Score: 22  Functional Limitation: Mobility: Walking and moving around  Mobility: Walking and Moving Around Current Status (): At least 20 percent but less than 40 percent impaired, limited or restricted  Mobility: Walking and Moving Around Goal Status (): At least 1 percent but less than 20 percent impaired, limited or restricted      Zana Mullins, PT  7/4/2018

## 2018-07-04 NOTE — PLAN OF CARE
Problem: Patient Care Overview  Goal: Plan of Care Review  Outcome: Ongoing (interventions implemented as appropriate)   07/04/18 1880   Coping/Psychosocial   Plan of Care Reviewed With patient;family   OTHER   Outcome Summary PT eval done. She is walking well with a rwx so put a cane in her room. Our primary focus will be on her right TKR to work on ROM and right LE strenthening.

## 2018-07-04 NOTE — PROGRESS NOTES
[facility] Medicine Services  INPATIENT PROGRESS NOTE    Patient Name: Cristiane Harris  Date of Admission: 7/2/2018  Today's Date: 07/04/18  Length of Stay: 2  Primary Care Physician: Julius Daniel MD    Subjective   Chief Complaint: diarrhea     HPI   Patient is currently laying in bed.  She states she is still having frequent liquid stools.  Still does not feel the best.  Tolerating a full liquid diet currently.  No bleeding noted.  Generalized abdominal cramping.    Review of Systems   Constitutional: Positive for activity change and appetite change.   HENT: Negative for hearing loss, nosebleeds, tinnitus and trouble swallowing.    Eyes: Negative for visual disturbance.   Respiratory: Negative for chest tightness, shortness of breath and wheezing.    Cardiovascular: Negative for palpitations and leg swelling.   Gastrointestinal: Positive for abdominal pain (Cramping), diarrhea and nausea (Mild). Negative for abdominal distention, blood in stool and constipation.   Endocrine: Negative for cold intolerance, heat intolerance, polydipsia, polyphagia and polyuria.   Genitourinary: Negative for decreased urine volume, difficulty urinating, dysuria, flank pain, frequency and hematuria.   Allergic/Immunologic: Negative for immunocompromised state.   Neurological: Positive for weakness. Negative for dizziness, syncope and light-headedness.   Hematological: Negative for adenopathy. Does not bruise/bleed easily.   Psychiatric/Behavioral: Negative for confusion and sleep disturbance. The patient is not nervous/anxious.       All pertinent negatives and positives are as above. All other systems have been reviewed and are negative unless otherwise stated.     Objective    Temp:  [97.5 °F (36.4 °C)-98.6 °F (37 °C)] 97.5 °F (36.4 °C)  Heart Rate:  [60-89] 78  Resp:  [16-18] 16  BP: (113-139)/(51-68) 134/51     Physical Exam   Constitutional: She is oriented to person, place, and time. She  appears well-developed and well-nourished.   HENT:   Head: Normocephalic and atraumatic.   Eyes: Conjunctivae and EOM are normal. Pupils are equal, round, and reactive to light.   Neck: Neck supple. No JVD present. No thyromegaly present.   Cardiovascular: Normal rate, regular rhythm, normal heart sounds and intact distal pulses.  Exam reveals no gallop and no friction rub.    No murmur heard.  Pulmonary/Chest: Effort normal and breath sounds normal. No respiratory distress. She has no wheezes. She has no rales. She exhibits no tenderness.   Abdominal: Soft. Bowel sounds are normal. She exhibits no distension. There is tenderness (mild ). There is no rebound and no guarding.   Musculoskeletal: Normal range of motion. She exhibits no edema, tenderness or deformity.   Right knee surgical wound with mepliex (C/D/I).   Lymphadenopathy:     She has no cervical adenopathy.   Neurological: She is alert and oriented to person, place, and time. She displays normal reflexes. No cranial nerve deficit. She exhibits normal muscle tone.   Skin: Skin is warm and dry. No rash noted.   Psychiatric: She has a normal mood and affect. Her behavior is normal. Judgment and thought content normal.   Nursing note and vitals reviewed.    Results Review:  I have reviewed the labs, radiology results, and diagnostic studies.    Laboratory Data:     Results from last 7 days  Lab Units 07/04/18  0512 07/03/18  1949 07/03/18  1358  07/03/18  0025 07/02/18  1833   WBC 10*3/mm3 4.05*  --   --   --  4.11* 4.72*   HEMOGLOBIN g/dL 7.0* 7.1* 7.0*  < > 7.1* 7.3*   HEMATOCRIT % 21.7* 22.0* 22.2*  < > 21.3* 22.3*   PLATELETS 10*3/mm3 324  --   --   --  308 328   < > = values in this interval not displayed.     Results from last 7 days  Lab Units 07/04/18  0512 07/03/18  0025 07/02/18  1833   SODIUM mmol/L 138 136 137   POTASSIUM mmol/L 2.8* 3.4* 2.6*   CHLORIDE mmol/L 109 106 104   CO2 mmol/L 18.0* 20.0* 20.0*   BUN mg/dL 11 14 14   CREATININE mg/dL 0.73  0.76 0.76   CALCIUM mg/dL 8.3* 8.0* 8.3*   BILIRUBIN mg/dL  --  0.2 0.2   ALK PHOS U/L  --  44 46   ALT (SGPT) U/L  --  32 28   AST (SGOT) U/L  --  35 32   GLUCOSE mg/dL 77 110* 114*       Results from last 7 days  Lab Units 07/03/18  0819   IRON mcg/dL <10*   TIBC mcg/dL 293     Microbiology Results (last 10 days)     Procedure Component Value - Date/Time    Gastrointestinal Panel, PCR - Stool, Per Rectum [[id number]]  (Abnormal) Collected:  07/03/18 1006    Lab Status:  Final result Specimen:  Stool from Per Rectum Updated:  07/03/18 1334     Campylobacter Not Detected     Clostridium difficile (toxin A/B) Detected (A)     Comment:   Due to the high asymptomatic carriage rates, especially in young children, the clinical relevance of the detection of toxigenic C. difficile from stool should be considered in the context of other clinical findings, patient age, and risk factors including hospitalization and antibiotic exposure.        Plesiomonas shigelloides Not Detected     Salmonella Detected (A)     Vibrio Not Detected     Vibrio cholerae Not Detected     Yersinia enterocolitica Not Detected     Enteroaggregative E. coli (EAEC) Not Detected     Enteropathogenic E. coli (EPEC) Detected (A)     Enterotoxigenic E. coli (ETEC) lt/st Not Detected     Shiga-like toxin-producing E. coli (STEC) stx1/stx2 Not Detected     E. coli O157 Not Detected     Shigella/Enteroinvasive E. coli (EIEC) Not Detected     Cryptosporidium Not Detected     Cyclospora cayetanensis Not Detected     Entamoeba histolytica Not Detected     Giardia lamblia Not Detected     Adenovirus F40/41 Not Detected     Astrovirus Not Detected     Norovirus GI/GII Not Detected     Rotavirus A Not Detected     Sapovirus (I, II, IV or V) Not Detected    Blood Culture - Blood, [[id number]]  (Normal) Collected:  07/02/18 2000    Lab Status:  Preliminary result Specimen:  Blood from Arm, Left Updated:  07/03/18 2115     Blood Culture No growth at 24 hours    Blood  Culture - Blood, [[id number]]  (Normal) Collected:  07/02/18 1833    Lab Status:  Preliminary result Specimen:  Blood from Arm, Left Updated:  07/03/18 1915     Blood Culture No growth at 24 hours        Culture Data:   Blood Culture   Date Value Ref Range Status   07/02/2018 No growth at less than 24 hours  Preliminary     Radiology Data:   Imaging Results (last 24 hours)     Procedure Component Value Units Date/Time    XR Chest 1 View [[id number]] Collected:  07/03/18 0743     Updated:  07/03/18 0746    Narrative:       XR CHEST 1 VW- 7/3/2018 3:12 AM CDT     HISTORY: leukopenia; D64.9-Anemia, unspecified; K92.2-Gastrointestinal  hemorrhage, unspecified; E87.6-Hypokalemia; R19.7-Diarrhea, unspecified;  R11.2-Nausea with vomiting, unspecified; K59.39-Other megacolon       COMPARISON: None.     FINDINGS:   The lungs are clear. The cardiomediastinal silhouette and pulmonary  vascularity are within normal limits.      The osseous structures and surrounding soft tissues demonstrate no acute  abnormality. Surgical clips are seen in the neck.       Impression:       1. No radiographic evidence of acute cardiopulmonary process.        This report was finalized on 07/03/2018 07:43 by Dr. Corey Rodriguez MD.        I have reviewed the patient's current medications.     Assessment/Plan     Assessment:  1.  Nausea and vomiting  2.  Intractable diarrhea, C. Difficile   3.  Abdominal pain, generalized   4.  Hypokalemia  5.  Positive fecal occult   6.  Severe iron deficient anemia  7.  History of a gastrointestinal stromal tumor, non-malignant, status post whipple procedure in 09/2014   8.  Hyperlipidemia  9.  Essential hypertension  10.  Right total knee replacement, 6/20/18    Plan:  1.  Change IV fluids to NS with KCL at 50 ml/hr   2.  CBC and BMP in AM  3.  GI PCR - positive for Cdiff, salmonella and E.coli  4.  Hemoglobin and hematocrit every 8 hours   5.  Oral vancomycin day #2  6.  PT/OT - recent knee replacement  7.  Normal  colonoscopy in May 2018  8.  SCDs for DVT prophylaxis   9.  GI - took for endoscopy yesterday and had papule removed with two clips to achieve hemostasis.   10.  Up with assistance  11.  Monitor blood pressure closely  12.  Full liquid diet, advance as tolerated.   13.  Replace K+   14.  Type and screened - currently asymptomatic with hemoglobin 7.0 hematocrit 21.7  15.  Venofer day #2/3  16.  Protonix oral daily   17.  Staples to be removed tomorrow    Discharge Planning: I expect the patient to be discharged to home in 1-2 days.    Patient sitting up in bed washing her face. States that she continues to have more than 10 BMs per day. States that her bottom is not getting irritated. No other complaints. Continue current plan. Patient very pleasant, and no needs expressed at the time of exam.     Rajesh Chavarria, EARNEST   07/04/18   6:59 AM

## 2018-07-05 LAB
ANION GAP SERPL CALCULATED.3IONS-SCNC: 10 MMOL/L (ref 4–13)
BUN BLD-MCNC: 8 MG/DL (ref 5–21)
BUN/CREAT SERPL: 12.5 (ref 7–25)
CALCIUM SPEC-SCNC: 8.5 MG/DL (ref 8.4–10.4)
CHLORIDE SERPL-SCNC: 110 MMOL/L (ref 98–110)
CO2 SERPL-SCNC: 18 MMOL/L (ref 24–31)
CREAT BLD-MCNC: 0.64 MG/DL (ref 0.5–1.4)
DEPRECATED RDW RBC AUTO: 49.5 FL (ref 40–54)
ERYTHROCYTE [DISTWIDTH] IN BLOOD BY AUTOMATED COUNT: 14.7 % (ref 12–15)
GFR SERPL CREATININE-BSD FRML MDRD: 88 ML/MIN/1.73
GIANT PLATELETS: NORMAL
GLUCOSE BLD-MCNC: 94 MG/DL (ref 70–100)
HCT VFR BLD AUTO: 20.1 % (ref 37–47)
HCT VFR BLD AUTO: 21.3 % (ref 37–47)
HCT VFR BLD AUTO: 22.2 % (ref 37–47)
HCT VFR BLD AUTO: 30.8 % (ref 37–47)
HGB BLD-MCNC: 10.2 G/DL (ref 12–16)
HGB BLD-MCNC: 6.7 G/DL (ref 12–16)
HGB BLD-MCNC: 7.1 G/DL (ref 12–16)
HGB BLD-MCNC: 7.3 G/DL (ref 12–16)
HYPOCHROMIA BLD QL: NORMAL
LYMPHOCYTES # BLD MANUAL: 1.11 10*3/MM3 (ref 0.72–4.86)
LYMPHOCYTES NFR BLD MANUAL: 21.3 % (ref 15–45)
LYMPHOCYTES NFR BLD MANUAL: 9.6 % (ref 4–12)
MCH RBC QN AUTO: 31.3 PG (ref 28–32)
MCHC RBC AUTO-ENTMCNC: 33.3 G/DL (ref 33–36)
MCV RBC AUTO: 93.9 FL (ref 82–98)
MONOCYTES # BLD AUTO: 0.5 10*3/MM3 (ref 0.19–1.3)
NEUTROPHILS # BLD AUTO: 3.6 10*3/MM3 (ref 1.87–8.4)
NEUTROPHILS NFR BLD MANUAL: 66 % (ref 39–78)
NEUTS BAND NFR BLD MANUAL: 3.2 % (ref 0–10)
PLATELET # BLD AUTO: 338 10*3/MM3 (ref 130–400)
PMV BLD AUTO: 10 FL (ref 6–12)
POIKILOCYTOSIS BLD QL SMEAR: NORMAL
POLYCHROMASIA BLD QL SMEAR: NORMAL
POTASSIUM BLD-SCNC: 3.4 MMOL/L (ref 3.5–5.3)
RBC # BLD AUTO: 2.14 10*6/MM3 (ref 4.2–5.4)
SODIUM BLD-SCNC: 138 MMOL/L (ref 135–145)
WBC MORPH BLD: NORMAL
WBC NRBC COR # BLD: 5.21 10*3/MM3 (ref 4.8–10.8)

## 2018-07-05 PROCEDURE — 85018 HEMOGLOBIN: CPT | Performed by: NURSE PRACTITIONER

## 2018-07-05 PROCEDURE — 86900 BLOOD TYPING SEROLOGIC ABO: CPT

## 2018-07-05 PROCEDURE — 85014 HEMATOCRIT: CPT | Performed by: NURSE PRACTITIONER

## 2018-07-05 PROCEDURE — 99232 SBSQ HOSP IP/OBS MODERATE 35: CPT | Performed by: INTERNAL MEDICINE

## 2018-07-05 PROCEDURE — P9016 RBC LEUKOCYTES REDUCED: HCPCS

## 2018-07-05 PROCEDURE — 80048 BASIC METABOLIC PNL TOTAL CA: CPT | Performed by: NURSE PRACTITIONER

## 2018-07-05 PROCEDURE — 97110 THERAPEUTIC EXERCISES: CPT

## 2018-07-05 PROCEDURE — 25810000003 SODIUM CHLORIDE 0.9 % WITH KCL 20 MEQ 20-0.9 MEQ/L-% SOLUTION: Performed by: NURSE PRACTITIONER

## 2018-07-05 PROCEDURE — 86920 COMPATIBILITY TEST SPIN: CPT

## 2018-07-05 PROCEDURE — 97116 GAIT TRAINING THERAPY: CPT

## 2018-07-05 PROCEDURE — 85007 BL SMEAR W/DIFF WBC COUNT: CPT | Performed by: NURSE PRACTITIONER

## 2018-07-05 PROCEDURE — 86901 BLOOD TYPING SEROLOGIC RH(D): CPT

## 2018-07-05 PROCEDURE — 25010000002 IRON SUCROSE PER 1 MG: Performed by: NURSE PRACTITIONER

## 2018-07-05 PROCEDURE — 36430 TRANSFUSION BLD/BLD COMPNT: CPT

## 2018-07-05 PROCEDURE — 85025 COMPLETE CBC W/AUTO DIFF WBC: CPT | Performed by: NURSE PRACTITIONER

## 2018-07-05 RX ORDER — ACETAMINOPHEN 325 MG/1
650 TABLET ORAL EVERY 6 HOURS PRN
Status: DISCONTINUED | OUTPATIENT
Start: 2018-07-05 | End: 2018-07-07 | Stop reason: HOSPADM

## 2018-07-05 RX ADMIN — TIMOLOL MALEATE 1 DROP: 5 SOLUTION/ DROPS OPHTHALMIC at 09:05

## 2018-07-05 RX ADMIN — ACETAMINOPHEN 650 MG: 325 TABLET, FILM COATED ORAL at 21:16

## 2018-07-05 RX ADMIN — VANCOMYCIN HYDROCHLORIDE 250 MG: KIT at 06:52

## 2018-07-05 RX ADMIN — LATANOPROST 1 DROP: 50 SOLUTION OPHTHALMIC at 20:30

## 2018-07-05 RX ADMIN — POTASSIUM CHLORIDE AND SODIUM CHLORIDE 50 ML/HR: 900; 150 INJECTION, SOLUTION INTRAVENOUS at 04:49

## 2018-07-05 RX ADMIN — VANCOMYCIN HYDROCHLORIDE 125 MG: KIT at 17:21

## 2018-07-05 RX ADMIN — PENTOSAN POLYSULFATE SODIUM 100 MG: 100 CAPSULE, GELATIN COATED ORAL at 17:20

## 2018-07-05 RX ADMIN — TIMOLOL MALEATE 1 DROP: 5 SOLUTION/ DROPS OPHTHALMIC at 20:27

## 2018-07-05 RX ADMIN — VANCOMYCIN HYDROCHLORIDE 125 MG: KIT at 12:24

## 2018-07-05 RX ADMIN — DORZOLAMIDE HYDROCHLORIDE 1 DROP: 20 SOLUTION OPHTHALMIC at 09:06

## 2018-07-05 RX ADMIN — ATORVASTATIN CALCIUM 10 MG: 10 TABLET, FILM COATED ORAL at 20:25

## 2018-07-05 RX ADMIN — PANTOPRAZOLE SODIUM 40 MG: 40 TABLET, DELAYED RELEASE ORAL at 06:52

## 2018-07-05 RX ADMIN — DORZOLAMIDE HYDROCHLORIDE 1 DROP: 20 SOLUTION OPHTHALMIC at 20:21

## 2018-07-05 RX ADMIN — PENTOSAN POLYSULFATE SODIUM 100 MG: 100 CAPSULE, GELATIN COATED ORAL at 06:52

## 2018-07-05 RX ADMIN — IRON SUCROSE 300 MG: 20 INJECTION, SOLUTION INTRAVENOUS at 09:42

## 2018-07-05 RX ADMIN — DILTIAZEM HYDROCHLORIDE 120 MG: 120 CAPSULE, COATED, EXTENDED RELEASE ORAL at 09:04

## 2018-07-05 RX ADMIN — VANCOMYCIN HYDROCHLORIDE 125 MG: KIT at 00:19

## 2018-07-05 RX ADMIN — POTASSIUM CHLORIDE 40 MEQ: 20 SOLUTION ORAL at 00:19

## 2018-07-05 NOTE — THERAPY TREATMENT NOTE
Acute Care - Physical Therapy Treatment Note  [facility]     Patient Name: Cristiane Harris  : 1929  MRN: [id number]  Today's Date: 2018  Onset of Illness/Injury or Date of Surgery: 18  Date of Referral to PT: 18  Referring Physician: BERNARDO Chavarria    Admit Date: 2018    Visit Dx:    ICD-10-CM ICD-9-CM   1. Anemia, unspecified type D64.9 285.9   2. Gastrointestinal hemorrhage, unspecified gastrointestinal hemorrhage type K92.2 578.9   3. Hypokalemia E87.6 276.8   4. Diarrhea, unspecified type R19.7 787.91   5. Nausea and vomiting, intractability of vomiting not specified, unspecified vomiting type R11.2 787.01   6. Dilatation of colon K59.39 564.7   7. Impaired mobility and ADLs Z74.09 799.89     Patient Active Problem List   Diagnosis   • Essential hypertension   • Atherosclerosis of both carotid arteries   • Chest pain syndrome   • Hyperlipidemia LDL goal <100   • Bradycardia on ECG   • Preoperative cardiovascular examination   • Anemia   • Nausea and vomiting       Therapy Treatment          Rehabilitation Treatment Summary     Row Name 18 1451             Treatment Time/Intention    Discipline physical therapy assistant  -CW      Document Type therapy note (daily note)  -CW2      Subjective Information no complaints  -CW2      Mode of Treatment physical therapy  -CW2      Existing Precautions/Restrictions fall  -CW2      Treatment Considerations/Comments Recent R TKR  -CW2      Recorded by [CW] Joy Golden, \[facility] [place] [facility]\"" 18 1503  [CW2] Joy Golden, \[facility] [place] [facility]\"" 18 1547      Row Name 18 1451             Cognitive Assessment/Intervention- PT/OT    Personal Safety Interventions fall prevention program maintained;gait belt;nonskid shoes/slippers when out of bed  -CW      Recorded by [CW] Joy Golden, \[facility] [place] [facility]\"" 18 1610      Row Name 18 1451             Safety Issues, Functional Mobility    Impairments Affecting Function (Mobility) range of motion (ROM);strength   -CW      Recorded by [CW] Joy Golden, PTA 07/05/18 1610      Row Name 07/05/18 1451             Bed Mobility Assessment/Treatment    Supine-Sit [place] (Bed Mobility) conditional independence  -CW      Sit-Supine [place] (Bed Mobility) conditional independence  -CW      Comment (Bed Mobility) likes to keep bed rail down to get  to BSC easier  -CW      Recorded by [CW] Joy Golden, PTA 07/05/18 1610      Row Name 07/05/18 1451             Sit-Stand Transfer    Sit-Stand [place] (Transfers) conditional independence  -CW      Recorded by [CW] Joy Golden, PTA 07/05/18 1610      Row Name 07/05/18 1451             Stand-Sit Transfer    Stand-Sit [place] (Transfers) conditional independence  -CW      Recorded by [CW] Joy Golden, PTA 07/05/18 1610      Row Name 07/05/18 1451             Gait/Stairs Assessment/Training    [place] Level (Gait) stand by assist;verbal cues  -CW      Assistive Device (Gait) --   quad cane 10' then held IV pole for balance  -CW      Distance in Feet (Gait) 250 x 2  -CW      Pattern (Gait) step-through  -CW      Comment (Gait/Stairs) Pt does not feel safe with quad cane.  Would like to keep using rolling walker for now. Placed rwx in room.  -CW      Recorded by [CW] Joy Golden, PTA 07/05/18 1610      Row Name 07/05/18 1451             Therapeutic Exercise    Lower Extremity (Therapeutic Exercise) gluteal sets;quad sets, right;LAQ (long arc quad), right;heel slides, right;SAQ (short arc quad), right;SLR (straight leg raise), right  -CW      Lower Extremity Range of Motion (Therapeutic Exercise) hip abduction/adduction, right;ankle dorsiflexion/plantar flexion, right  -CW      Exercise Type (Therapeutic Exercise) AROM (active range of motion)  -CW      Position (Therapeutic Exercise) supine  -CW      Sets/Reps (Therapeutic Exercise) 20  -CW      Recorded by [CW] Joy Golden, PTA 07/05/18 1610      Row Name 07/05/18 1451              Positioning and Restraints    Pre-Treatment Position in bed  -CW      Post Treatment Position bed  -CW      In Bed fowlers;call light within reach;encouraged to call for assist  -CW      Recorded by [CW] Joy PRATIBHA Golden, [facility] 07/05/18 1617        User Key  (r) = Recorded By, (t) = Taken By, (c) = Cosigned By    Initials Name Effective Dates Discipline    CW Joy Golden, PTA 06/22/15 -  PT                     Physical Therapy Education     Title: PT OT SLP Therapies (Active)     Topic: Physical Therapy (Active)     Point: Mobility training (Active)    Learning Progress Summary     Learner Status Readiness Method Response Comment Documented by    Patient Done Acceptance E VU,DU bed mobility, transfers, gait, assistive device, HEP, plan of care  07/05/18 1617     Active Eager E NR knee stretches  07/04/18 1606    Family Active Eager E NR knee stretches  07/04/18 1606          Point: Home exercise program (Active)    Learning Progress Summary     Learner Status Readiness Method Response Comment Documented by    Patient Done Acceptance E VU,DU bed mobility, transfers, gait, assistive device, HEP, plan of care  07/05/18 1617     Active Eager E NR knee stretches  07/04/18 1606    Family Active Eager E NR knee stretches  07/04/18 1606          Point: Body mechanics (Active)    Learning Progress Summary     Learner Status Readiness Method Response Comment Documented by    Patient Done Acceptance E VU,DU bed mobility, transfers, gait, assistive device, HEP, plan of care  07/05/18 1617     Active Eager E NR knee stretches  07/04/18 1606    Family Active Eager E NR knee stretches  07/04/18 1606          Point: Precautions (Active)    Learning Progress Summary     Learner Status Readiness Method Response Comment Documented by    Patient Done Acceptance E VU,DU bed mobility, transfers, gait, assistive device, HEP, plan of care  07/05/18 1617     Active Eager E NR knee stretches  07/04/18 1606     Family Active Eager E NR knee stretches  07/04/18 1606                      User Key     Initials Effective Dates Name Provider Type Discipline     06/08/18 -  Zana Mullins, PT Physical Therapist PT     06/22/15 -  Joy Golden PTA Physical Therapy Assistant PT                    PT Recommendation and Plan     Plan of Care Reviewed With: patient  Progress: improving  Outcome Summary: Pt states she is beginning to feel better after blood transfusion.  Completed active ROM exercises in supine.  Independent for bed moility and sit to stand transfers.  Ambulated  in room with quad cane but requested to go back to using rolling walker for now.  She does not feel safe with cane yet.  Ambulated cga holding to IV pole in hallway 250' twice.  Will use rwx next therapy session.  Will continue to benefit from therapy to improve independence with walking.          Outcome Measures     Row Name 07/05/18 1600 07/04/18 1600 07/03/18 1116       How much help from another person do you currently need...    Turning from your back to your side while in flat bed without using bedrails? 4  -CW 4  -TB  --    Moving from lying on back to sitting on the side of a flat bed without bedrails? 4  -CW 4  -TB  --    Moving to and from a bed to a chair (including a wheelchair)? 4  -CW 4  -TB  --    Standing up from a chair using your arms (e.g., wheelchair, bedside chair)? 4  -CW 4  -TB  --    Climbing 3-5 steps with a railing? 3  -CW 3  -TB  --    To walk in hospital room? 3  -CW 3  -TB  --    AM-PAC 6 Clicks Score 22  -CW 22  -TB  --       How much help from another is currently needed...    Putting on and taking off regular lower body clothing?  --  -- 4  -CS    Bathing (including washing, rinsing, and drying)  --  -- 4  -CS    Toileting (which includes using toilet bed pan or urinal)  --  -- 4  -CS    Putting on and taking off regular upper body clothing  --  -- 4  -CS    Taking care of personal grooming (such as brushing  teeth)  --  -- 4  -CS    Eating meals  --  -- 4  -CS    Score  --  -- 24  -CS       Functional Assessment    Outcome Measure Options AM-PAC 6 Clicks Basic Mobility (PT)  -CW AM-PAC 6 Clicks Basic Mobility (PT)  -TB AM-PAC 6 Clicks Daily Activity (OT)  -CS      User Key  (r) = Recorded By, (t) = Taken By, (c) = Cosigned By    Initials Name Provider Type    TB Zana Mullins, PT Physical Therapist    CS Teresa Castellon, OTR/L Occupational Therapist    CW Joy Golden PTA Physical Therapy Assistant           Time Calculation:         PT Charges     Row Name 07/05/18 1622             Time Calculation    Start Time 1451  -CW      Stop Time 1544  -CW      Time Calculation (min) 53 min  -CW      PT Received On 07/05/18  -CW      PT Goal Re-Cert Due Date 07/14/18  -CW         Time Calculation- PT    Total Timed Code Minutes- PT 53 minute(s)  -CW         Timed Charges    71580 - PT Therapeutic Exercise Minutes 30  -CW      05061 - Gait Training Minutes  23  -CW        User Key  (r) = Recorded By, (t) = Taken By, (c) = Cosigned By    Initials Name Provider Type    CW Joy Golden PTA Physical Therapy Assistant        Therapy Suggested Charges     Code   Minutes Charges    95047 (CPT®) Hc Pt Neuromusc Re Education Ea 15 Min      60354 (CPT®) Hc Pt Ther Proc Ea 15 Min 30 2    70444 (CPT®) Hc Gait Training Ea 15 Min 23 2    14891 (CPT®) Hc Pt Therapeutic Act Ea 15 Min      12873 (CPT®) Hc Pt Manual Therapy Ea 15 Min      26844 (CPT®) Hc Pt Iontophoresis Ea 15 Min      20524 (CPT®) Hc Pt Elec Stim Ea-Per 15 Min      72833 (CPT®) Hc Pt Ultrasound Ea 15 Min      97345 (CPT®) Hc Pt Self Care/Mgmt/Train Ea 15 Min      Total  53 4        Therapy Charges for Today     Code Description Service Date Service Provider Modifiers Qty    55543207934 HC PT THER PROC EA 15 MIN 7/5/2018 Joy Golden PTA GP, KX 2    89029590315 HC GAIT TRAINING EA 15 MIN 7/5/2018 Joy Golden PTA GP, KX 2          PT G-Codes  Outcome  Measure Options: AM-PAC 6 Clicks Basic Mobility (PT)  Score: 22  Functional Limitation: Mobility: Walking and moving around  Mobility: Walking and Moving Around Current Status (): At least 20 percent but less than 40 percent impaired, limited or restricted  Mobility: Walking and Moving Around Goal Status (): At least 1 percent but less than 20 percent impaired, limited or restricted    Joy Golden, PTA  7/5/2018

## 2018-07-05 NOTE — PROGRESS NOTES
[facility] Medicine Services  INPATIENT PROGRESS NOTE    Length of Stay: 3  Date of Admission: 7/2/2018  Primary Care Physician: Julius Daniel MD    Subjective   Chief Complaint: diarrhea  HPI   Sitting up in bed.  Daughter in room.  Greater than 20 stools over the past 24 hours.  Stools were yellow yesterday.  Today stools are darker in color.  Still no consistency, not formed.  Mild nausea without vomiting.  Diet has been advanced per GI and she is tolerating this morning.  No bright red bleeding per rectum.  Abdominal cramping improving. Staples to be removed from right knee today.    Review of Systems   Constitutional: Positive for activity change and appetite change.   HENT: Negative for congestion and trouble swallowing.    Eyes: Negative for photophobia and visual disturbance.   Respiratory: Negative for cough, shortness of breath and wheezing.    Cardiovascular: Negative for chest pain, palpitations and leg swelling.   Gastrointestinal: Positive for abdominal pain (cramping lower abdomen some improved), diarrhea (watery) and nausea. Negative for vomiting.   Endocrine: Negative for cold intolerance, heat intolerance and polyuria.   Genitourinary: Negative for dysuria.   Musculoskeletal: Positive for gait problem (recent right knee surgery).   Skin: Positive for wound (Right knee surgical incision).   Allergic/Immunologic: Negative for immunocompromised state.   Neurological: Positive for weakness.   Hematological: Negative for adenopathy. Does not bruise/bleed easily.   Psychiatric/Behavioral: Negative for agitation, behavioral problems and confusion.      All pertinent negatives and positives are as above. All other systems have been reviewed and are negative unless otherwise stated.     Objective    Temp:  [97.7 °F (36.5 °C)-98.6 °F (37 °C)] 98.6 °F (37 °C)  Heart Rate:  [64-91] 71  Resp:  [16-19] 16  BP: (102-143)/(48-63) 143/63  Physical Exam   Constitutional: She is  oriented to person, place, and time. She appears well-developed and well-nourished.   HENT:   Head: Normocephalic and atraumatic.   Eyes: EOM are normal. Pupils are equal, round, and reactive to light.   Neck: Normal range of motion. Neck supple.   Cardiovascular: Normal rate, regular rhythm, normal heart sounds and intact distal pulses.  Exam reveals no friction rub.    No murmur heard.  Pulmonary/Chest: Effort normal and breath sounds normal. No respiratory distress. She has no wheezes. She has no rales.   Abdominal: Soft. Bowel sounds are normal. There is tenderness (Mild).   Genitourinary:   Genitourinary Comments: Voiding   Musculoskeletal: She exhibits no edema or deformity.   Neurological: She is alert and oriented to person, place, and time.   Skin: Skin is warm and dry.   Skin staples right knee surgical incision.  Mepilex dressing in place, dry and intact   Psychiatric: She has a normal mood and affect. Her behavior is normal. Judgment and thought content normal.     Results Review:  I have reviewed the labs, radiology results, and diagnostic studies.    Laboratory Data:     Results from last 7 days  Lab Units 07/05/18  0719 07/05/18  0622 07/04/18  2340  07/04/18  0512  07/03/18  0025   WBC 10*3/mm3  --  5.21  --   --  4.05*  --  4.11*   HEMOGLOBIN g/dL 7.1* 6.7* 7.3*  < > 7.0*  < > 7.1*   HEMATOCRIT % 21.3* 20.1* 22.2*  < > 21.7*  < > 21.3*   PLATELETS 10*3/mm3  --  338  --   --  324  --  308   < > = values in this interval not displayed.       Results from last 7 days  Lab Units 07/05/18  0622 07/04/18  1624 07/04/18  0512 07/03/18  0025 07/02/18  1833   SODIUM mmol/L 138  --  138 136 137   POTASSIUM mmol/L 3.4* 3.1* 2.8* 3.4* 2.6*   CHLORIDE mmol/L 110  --  109 106 104   CO2 mmol/L 18.0*  --  18.0* 20.0* 20.0*   BUN mg/dL 8  --  11 14 14   CREATININE mg/dL 0.64  --  0.73 0.76 0.76   CALCIUM mg/dL 8.5  --  8.3* 8.0* 8.3*   BILIRUBIN mg/dL  --   --   --  0.2 0.2   ALK PHOS U/L  --   --   --  44 46   ALT  (SGPT) U/L  --   --   --  32 28   AST (SGOT) U/L  --   --   --  35 32   GLUCOSE mg/dL 94  --  77 110* 114*     Blood Culture   Date Value Ref Range Status   07/02/2018 No growth at 2 days  Preliminary   07/02/2018 No growth at 2 days  Preliminary     Stool Culture   Date Value Ref Range Status   07/03/2018 Heavy growth (4+) Salmonella species (A)  Preliminary     Abnormal) Collected: 07/03/18 1006   Lab Status: Final result Specimen: Stool from Per Rectum Updated: 07/03/18 1334    Campylobacter Not Detected    Clostridium difficile (toxin A/B) Detected (A)    Comment:   Due to the high asymptomatic carriage rates, especially in young children, the clinical relevance of the detection of toxigenic C. difficile from stool should be considered in the context of other clinical findings, patient age, and risk factors including hospitalization and antibiotic exposure.       Plesiomonas shigelloides Not Detected    Salmonella Detected (A)    Vibrio Not Detected    Vibrio cholerae Not Detected    Yersinia enterocolitica Not Detected    Enteroaggregative E. coli (EAEC) Not Detected    Enteropathogenic E. coli (EPEC) Detected (A)    Enterotoxigenic E. coli (ETEC) lt/st Not Detected    Shiga-like toxin-producing E. coli (STEC) stx1/stx2 Not Detected    E. coli O157 Not Detected    Shigella/Enteroinvasive E. coli (EIEC) Not Detected    Cryptosporidium Not Detected    Cyclospora cayetanensis Not Detected    Entamoeba histolytica Not Detected    Giardia lamblia Not Detected    Adenovirus F40/41 Not Detected    Astrovirus Not Detected    Norovirus GI/GII Not Detected    Rotavirus A Not Detected    Sapovirus (I, II, IV or V) Not Detected     Imaging Results (all)     Procedure Component Value Units Date/Time    XR Chest 1 View [[id number]] Collected:  07/03/18 0743     Updated:  07/03/18 0746    Narrative:       XR CHEST 1 VW- 7/3/2018 3:12 AM CDT     HISTORY: leukopenia; D64.9-Anemia, unspecified; K92.2-Gastrointestinal  hemorrhage,  unspecified; E87.6-Hypokalemia; R19.7-Diarrhea, unspecified;  R11.2-Nausea with vomiting, unspecified; K59.39-Other megacolon       COMPARISON: None.     FINDINGS:   The lungs are clear. The cardiomediastinal silhouette and pulmonary  vascularity are within normal limits.      The osseous structures and surrounding soft tissues demonstrate no acute  abnormality. Surgical clips are seen in the neck.       Impression:       1. No radiographic evidence of acute cardiopulmonary process.        This report was finalized on 07/03/2018 07:43 by Dr. Corey Rodriguez MD.    XR Abdomen KUB [[id number]] Collected:  07/02/18 1812     Updated:  07/02/18 1816    Narrative:       XR ABDOMEN KUB- 7/2/2018 5:56 PM CDT     HISTORY: diarrhea       COMPARISON: None     FINDINGS:  There is a nonobstructive bowel gas pattern. Moderate colonic stool with  mild dilatation of the ascending colon up to 6.3 cm. Small bowel appears  decompressed. Extensive calcification of the costal cartilages. Splenic  artery calcification. Suture material noted in the left upper quadrant.  Evaluation for free intraperitoneal air is limited on a supine  radiograph, but there are no definite findings of free intraperitoneal  air.      The osseous structures demonstrate no acute abnormality. 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       Impression:       1. No radiographic evidence of acute abdominopelvic process. Bowel gas  pattern is nonobstructive.  2. Moderate colonic stool with mild dilatation of the ascending colon up  to 6.3 cm.        This report was finalized on 07/02/2018 18:13 by Dr Sandip Fox, .        Endoscopy 7/3/18  The esophagus was normal.    A single 13 mm papule (nodule) with contact bleeding was found at the anastomosis. Biopsies were  taken with a cold forceps for histology. It bled more after the biopsy. To control bleeding after the  biopsy, two hemostatic clips were successfully placed (MR conditional). There was  no bleeding at the  end of the maneuver. The patient has had a prior Whipple procedure. The anastomosis lead directly  into the jejunum. The mucosa was normal.    Intake/Output    Intake/Output Summary (Last 24 hours) at 07/05/18 0935  Last data filed at 07/05/18 0908   Gross per 24 hour   Intake             1784 ml   Output                0 ml   Net             1784 ml       Scheduled Meds    atorvastatin 10 mg Oral Nightly   diltiaZEM  mg Oral Daily   dorzolamide 1 drop Both Eyes BID   iron sucrose (VENOFER) IVPB 300 mg Intravenous Q24H   latanoprost 1 drop Both Eyes Nightly   pantoprazole 40 mg Oral Q AM   pentosan polysulfate 100 mg Oral BID AC   timolol 1 drop Both Eyes Q12H   vancomycin 125 mg Oral Q6H       I have reviewed the patient current medications.     Assessment/Plan     Hospital Problem List     Anemia    Nausea and vomiting    Overview Signed 7/3/2018 12:05 PM by EARNEST Ny     Added automatically from request for surgery [id number]             Assessment:  1.  Clostridium Difficile diarrhea, salmonella, Enteropathogenic Escherichia coli positive  2.  Nausea with vomiting, improved  3.  Abdominal pain, generalized, improved  4.  Chronic normocytic anemia with iron deficiency, iron less than 10  5.  Blood loss anemia with fecal occult positive required transfusion  6.  Bleeding papule 13 mm stomach clipped per endo 7/3/18  7.  Hypokalemia  8.  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  Essential hypertension  11.  Hyperlipidemia    Plan:  1.  GI panel positive for Clostridium difficile, salmonella, Escherichia coli.  2.  Vancomycin oral day  2  3.  Normal saline with 20 mEq of potassium at 50 mL per hour.  4.  H/H 6.7/20.1.  Transfuse 2 units packed red blood cells for blood loss anemia.  5.  Monitor hemoglobin and hematocrit every 8 hours  6.  Normal colonoscopy May 2018.  7.   Endoscopy 7/3 papule with bleeding, clips applied to achieve hemostasis.  8.  Venofer IV day 3/3  9.  Protonix oral  10.  Physical therapy occupational therapy  11.  Staples to be removed right knee surgical incision today.  12.  SCDs for deep vein thrombosis prophylaxis  13.  Advanced to regular diet.  14.  Her son and daughter are her surrogate decision makers.    The above documentation resulted from a face-to-face encounter by me Marisol TINOCO, [facility].    Patient seen and examined. We discussed her iron deficiency anemia and IV venofer. Several family members at bedside. Patient states that her diarrheal episodes are decreasing and she is starting to feel better. Examined the knee and mild erythema at the patella, has improved. No drainage. Half of the staples removed.     Discharge Planning: I expect patient to be discharged to home in 2-3 days.    EARNEST Garcia   07/05/18   9:35 AM

## 2018-07-05 NOTE — PLAN OF CARE
Problem: Patient Care Overview  Goal: Plan of Care Review  Outcome: Ongoing (interventions implemented as appropriate)   07/05/18 1611   Coping/Psychosocial   Plan of Care Reviewed With patient   Plan of Care Review   Progress no change   OTHER   Outcome Summary Pt cont to have diarrhea. Complains of nausea but no vomiting. She is now on a regular diet. Only 1 meal documented at 50%. Order initiated to offer banana with breakfast and yogurt TID with meals to possibly decrease diarrhea. Will follow for nutrition needs.       Problem: Nutrition, Imbalanced: Inadequate Oral Intake (Adult)  Goal: Identify Related Risk Factors and Signs and Symptoms  Outcome: Outcome(s) achieved Date Met: 07/05/18    Goal: Improved Oral Intake  Outcome: Ongoing (interventions implemented as appropriate)    Goal: Prevent Further Weight Loss  Outcome: Ongoing (interventions implemented as appropriate)

## 2018-07-05 NOTE — PROGRESS NOTES
[facility] Gastroenterology  Inpatient Progress Note  Today's date:  07/05/18    Cristiane Harris  11/13/1929       Reason for Follow Up:  Diarrhea positive for C. difficile and Escherichia coli /anemia -bleeding nodule on EGD    Subjective: The patient is sitting up in bed this morning and eating breakfast.  Family is at bedside.  The patient tells me that she had about 7-10 stools watery about 7 o'clock last night to 7 this morning.  She states that she did have 2 episodes of incontinence of stools which has never happened to her.The patient denies any nausea, vomiting, epigastric pain, dysphagia, pyrosis or hematemesis.  The patient denies any fever or chills.  Denies any melena or hematochezia.  Denies any unintentional weight loss or loss of appetite.    No Known Allergies    Current Facility-Administered Medications:   •  atorvastatin (LIPITOR) tablet 10 mg, 10 mg, Oral, Nightly, Mayela Hartmann MD, 10 mg at 07/04/18 2143  •  diltiaZEM CD (CARDIZEM CD) 24 hr capsule 120 mg, 120 mg, Oral, Daily, Mayela Hartmann MD, 120 mg at 07/05/18 0904  •  dorzolamide (TRUSOPT) 2 % ophthalmic solution 1 drop, 1 drop, Both Eyes, BID, EARNEST Santos, 1 drop at 07/05/18 0906  •  iron sucrose (VENOFER) 300 mg in sodium chloride 0.9 % 100 mL IVPB, 300 mg, Intravenous, Q24H, EARNEST Santos, 300 mg at 07/04/18 0959  •  latanoprost (XALATAN) 0.005 % ophthalmic solution 1 drop, 1 drop, Both Eyes, Nightly, EARNEST Santos, 1 drop at 07/04/18 2143  •  ondansetron (ZOFRAN) injection 4 mg, 4 mg, Intravenous, Q6H PRN, Mayela Hartmann MD, 4 mg at 07/02/18 2335  •  pantoprazole (PROTONIX) EC tablet 40 mg, 40 mg, Oral, Q AM, Latoya Dozier MD, 40 mg at 07/05/18 0652  •  pentosan polysulfate (ELMIRON) capsule 100 mg, 100 mg, Oral, BID AC, Mayela Hartmann MD, 100 mg at 07/05/18 0652  •  sodium chloride 0.9 % flush 1-10 mL, 1-10 mL, Intravenous, PRN, Mayela Hartmann MD  •  Insert peripheral IV, , ,  Once **AND** sodium chloride 0.9 % flush 10 mL, 10 mL, Intravenous, PRN, Nicko Seth MD  •  sodium chloride 0.9 % with KCl 20 mEq/L infusion, 50 mL/hr, Intravenous, Continuous, EARNEST Santos, Last Rate: 50 mL/hr at 07/05/18 0449, 50 mL/hr at 07/05/18 0449  •  timolol (TIMOPTIC) 0.5 % ophthalmic solution 1 drop, 1 drop, Both Eyes, Q12H, Ministerio Terrell MD, 1 drop at 07/05/18 0905  •  vancomycin oral solution 125 mg, 125 mg, Oral, Q6H, Ministerio Terrell MD, 250 mg at 07/05/18 0652    Review of Systems:   Review of Systems   Constitutional: Negative for fever and unexpected weight change.   HENT: Negative for hearing loss.    Eyes: Negative for visual disturbance.   Respiratory: Negative for cough.    Cardiovascular: Negative for chest pain.   Gastrointestinal:        See HPI   Endocrine: Negative for cold intolerance and heat intolerance.   Genitourinary: Negative for dysuria.   Musculoskeletal: Negative for arthralgias.   Skin: Negative for rash.   Neurological: Negative for seizures.   Psychiatric/Behavioral: Negative for hallucinations.        Vital Signs:  Temp:  [97.7 °F (36.5 °C)-98.6 °F (37 °C)] 98.6 °F (37 °C)  Heart Rate:  [64-91] 71  Resp:  [16-19] 16  BP: (102-143)/(48-63) 143/63  Body mass index is 17.89 kg/m².     Intake/Output Summary (Last 24 hours) at 07/05/18 0926  Last data filed at 07/05/18 0908   Gross per 24 hour   Intake             1784 ml   Output                0 ml   Net             1784 ml     I/O this shift:  In: 126 [I.V.:126]  Out: -   Physical Exam:  Physical Exam   Constitutional: She appears well-developed.   Cardiovascular: Normal rate and regular rhythm.    Pulmonary/Chest: Effort normal.   Abdominal: Soft. Bowel sounds are normal.   Neurological: She is alert.   Psychiatric: She has a normal mood and affect.     I have performed the physical exam and agree with the findings as noted above.   RAMON Dozier MD     Results Review:   I have reviewed all of the  patient's current test results      Results from last 7 days  Lab Units 07/05/18  0719 07/05/18  0622 07/04/18  2340  07/04/18  0512  07/03/18  0025   WBC 10*3/mm3  --  5.21  --   --  4.05*  --  4.11*   HEMOGLOBIN g/dL 7.1* 6.7* 7.3*  < > 7.0*  < > 7.1*   HEMATOCRIT % 21.3* 20.1* 22.2*  < > 21.7*  < > 21.3*   PLATELETS 10*3/mm3  --  338  --   --  324  --  308   < > = values in this interval not displayed.      Results from last 7 days  Lab Units 07/05/18  0622 07/04/18  1624 07/04/18  0512 07/03/18  0025 07/02/18  1833   SODIUM mmol/L 138  --  138 136 137   POTASSIUM mmol/L 3.4* 3.1* 2.8* 3.4* 2.6*   CHLORIDE mmol/L 110  --  109 106 104   CO2 mmol/L 18.0*  --  18.0* 20.0* 20.0*   BUN mg/dL 8  --  11 14 14   CREATININE mg/dL 0.64  --  0.73 0.76 0.76   CALCIUM mg/dL 8.5  --  8.3* 8.0* 8.3*   BILIRUBIN mg/dL  --   --   --  0.2 0.2   ALK PHOS U/L  --   --   --  44 46   ALT (SGPT) U/L  --   --   --  32 28   AST (SGOT) U/L  --   --   --  35 32   GLUCOSE mg/dL 94  --  77 110* 114*         Results from last 7 days  Lab Units 07/02/18  1833   INR  1.37*         Impression/Plan:  Patient Active Problem List   Diagnosis Code   • Essential hypertension I10   • Atherosclerosis of both carotid arteries I65.23   • Chest pain syndrome R07.9   • Hyperlipidemia LDL goal <100 E78.5   • Bradycardia on ECG R00.1   • Preoperative cardiovascular examination Z01.810   • Anemia D64.9   • Nausea and vomiting R11.2     C difficile diarrhea  She is up to date on colonoscopy.  No plans to repeat at this stage.The patient was found to have C. difficile along with Escherichia coli.  She is currently being treated with vancomycin.      Heme positive stool/anemia  The patient is to be transfused today with 2 units of packed red blood cells.  Hemoglobin dropped to 6.7 from 7.3 yesterday  EGD as noted above with findings of bleeding nodule in stomach at the whipple anastamosis that was clipped.  Pathology is still pending.  The patient is receiving  IV Venofer.      Chronic NSAID use      Rachana King APRKIMBERLEE  07/05/18  9:26 AM    Patient Seen, Chart, Consults, Notes, Labs, Radiology studies reviewed    Latoya Dozier MD  [facility]  07/05/18  10:40 AM    Much of this encounter note is an electronic transcription/translation of spoken language to printed text. The electronic translation of spoken language may permit erroneous, or at times, nonsensical words or phrases to be inadvertently transcribed; although I have reviewed the note for such errors, some may still exist.

## 2018-07-05 NOTE — PLAN OF CARE
Problem: Patient Care Overview  Goal: Plan of Care Review  Outcome: Ongoing (interventions implemented as appropriate)  ivf cont. Tolerating po intake. Still with some diarrhea, but less stools this shift. md informed of k+ 3.1, orders received and pt got 40 m3eq of liquid kcl x1 dose.  Cont to monitor hgb q8h.    07/05/18 0140   Coping/Psychosocial   Plan of Care Reviewed With patient   Plan of Care Review   Progress improving     Goal: Individualization and Mutuality  Outcome: Ongoing (interventions implemented as appropriate)    Goal: Discharge Needs Assessment  Outcome: Ongoing (interventions implemented as appropriate)      Problem: Fall Risk (Adult)  Goal: Absence of Fall  Outcome: Ongoing (interventions implemented as appropriate)      Problem: Skin Injury Risk (Adult)  Goal: Skin Health and Integrity  Outcome: Ongoing (interventions implemented as appropriate)

## 2018-07-05 NOTE — PLAN OF CARE
Problem: Patient Care Overview  Goal: Plan of Care Review  Outcome: Ongoing (interventions implemented as appropriate)  2Units RBC today, awaiting 1600 H/H results, VSS, BMX3 today, tolerating full liquid diet, safety maintained, up with assist   07/05/18 1447   Coping/Psychosocial   Plan of Care Reviewed With patient   Plan of Care Review   Progress improving     Goal: Individualization and Mutuality  Outcome: Ongoing (interventions implemented as appropriate)    Goal: Discharge Needs Assessment  Outcome: Ongoing (interventions implemented as appropriate)    Goal: Interprofessional Rounds/Family Conf  Outcome: Ongoing (interventions implemented as appropriate)      Problem: Fall Risk (Adult)  Goal: Absence of Fall  Outcome: Ongoing (interventions implemented as appropriate)      Problem: Skin Injury Risk (Adult)  Goal: Identify Related Risk Factors and Signs and Symptoms  Outcome: Ongoing (interventions implemented as appropriate)    Goal: Skin Health and Integrity  Outcome: Ongoing (interventions implemented as appropriate)

## 2018-07-05 NOTE — PLAN OF CARE
Problem: Patient Care Overview  Goal: Plan of Care Review  Outcome: Ongoing (interventions implemented as appropriate)   07/05/18 3468   Coping/Psychosocial   Plan of Care Reviewed With patient   Plan of Care Review   Progress improving   OTHER   Outcome Summary Pt states she is beginning to feel better after blood transfusion. Completed active ROM exercises in supine. Independent for bed mobility and sit to stand transfers. Ambulated in room with quad cane but requested to go back to using rolling walker for now. She does not feel safe with cane yet. Ambulated cga holding to IV pole in hallway 250' twice. Will use rwx next therapy session. Will continue to benefit from therapy to improve independence with walking.

## 2018-07-06 LAB
ABO + RH BLD: NORMAL
ABO + RH BLD: NORMAL
ANION GAP SERPL CALCULATED.3IONS-SCNC: 10 MMOL/L (ref 4–13)
BH BB BLOOD EXPIRATION DATE: NORMAL
BH BB BLOOD EXPIRATION DATE: NORMAL
BH BB BLOOD TYPE BARCODE: 600
BH BB BLOOD TYPE BARCODE: 600
BH BB DISPENSE STATUS: NORMAL
BH BB DISPENSE STATUS: NORMAL
BH BB PRODUCT CODE: NORMAL
BH BB PRODUCT CODE: NORMAL
BH BB UNIT NUMBER: NORMAL
BH BB UNIT NUMBER: NORMAL
BUN BLD-MCNC: 7 MG/DL (ref 5–21)
BUN/CREAT SERPL: 10.4 (ref 7–25)
CALCIUM SPEC-SCNC: 8.9 MG/DL (ref 8.4–10.4)
CHLORIDE SERPL-SCNC: 104 MMOL/L (ref 98–110)
CO2 SERPL-SCNC: 24 MMOL/L (ref 24–31)
CREAT BLD-MCNC: 0.67 MG/DL (ref 0.5–1.4)
CROSSMATCH INTERPRETATION: NORMAL
CROSSMATCH INTERPRETATION: NORMAL
CYTO UR: NORMAL
GFR SERPL CREATININE-BSD FRML MDRD: 83 ML/MIN/1.73
GLUCOSE BLD-MCNC: 87 MG/DL (ref 70–100)
HCT VFR BLD AUTO: 25.7 % (ref 37–47)
HCT VFR BLD AUTO: 28.1 % (ref 37–47)
HCT VFR BLD AUTO: 29.7 % (ref 37–47)
HGB BLD-MCNC: 8.8 G/DL (ref 12–16)
HGB BLD-MCNC: 9.5 G/DL (ref 12–16)
HGB BLD-MCNC: 9.8 G/DL (ref 12–16)
LAB AP CASE REPORT: NORMAL
PATH REPORT.FINAL DX SPEC: NORMAL
PATH REPORT.GROSS SPEC: NORMAL
POTASSIUM BLD-SCNC: 3.4 MMOL/L (ref 3.5–5.3)
SODIUM BLD-SCNC: 138 MMOL/L (ref 135–145)
UNIT  ABO: NORMAL
UNIT  ABO: NORMAL
UNIT  RH: NORMAL
UNIT  RH: NORMAL

## 2018-07-06 PROCEDURE — 85014 HEMATOCRIT: CPT | Performed by: NURSE PRACTITIONER

## 2018-07-06 PROCEDURE — 80048 BASIC METABOLIC PNL TOTAL CA: CPT | Performed by: NURSE PRACTITIONER

## 2018-07-06 PROCEDURE — 85018 HEMOGLOBIN: CPT | Performed by: NURSE PRACTITIONER

## 2018-07-06 PROCEDURE — 97110 THERAPEUTIC EXERCISES: CPT

## 2018-07-06 PROCEDURE — 99232 SBSQ HOSP IP/OBS MODERATE 35: CPT | Performed by: INTERNAL MEDICINE

## 2018-07-06 PROCEDURE — 25810000003 SODIUM CHLORIDE 0.9 % WITH KCL 20 MEQ 20-0.9 MEQ/L-% SOLUTION: Performed by: NURSE PRACTITIONER

## 2018-07-06 PROCEDURE — 97116 GAIT TRAINING THERAPY: CPT

## 2018-07-06 RX ORDER — POTASSIUM CHLORIDE 750 MG/1
40 CAPSULE, EXTENDED RELEASE ORAL ONCE
Status: COMPLETED | OUTPATIENT
Start: 2018-07-06 | End: 2018-07-06

## 2018-07-06 RX ADMIN — PANTOPRAZOLE SODIUM 40 MG: 40 TABLET, DELAYED RELEASE ORAL at 05:34

## 2018-07-06 RX ADMIN — VANCOMYCIN HYDROCHLORIDE 125 MG: KIT at 05:34

## 2018-07-06 RX ADMIN — POTASSIUM CHLORIDE 40 MEQ: 750 CAPSULE, EXTENDED RELEASE ORAL at 15:38

## 2018-07-06 RX ADMIN — VANCOMYCIN HYDROCHLORIDE 125 MG: KIT at 00:08

## 2018-07-06 RX ADMIN — DILTIAZEM HYDROCHLORIDE 120 MG: 120 CAPSULE, COATED, EXTENDED RELEASE ORAL at 08:45

## 2018-07-06 RX ADMIN — TIMOLOL MALEATE 1 DROP: 5 SOLUTION/ DROPS OPHTHALMIC at 08:46

## 2018-07-06 RX ADMIN — PENTOSAN POLYSULFATE SODIUM 100 MG: 100 CAPSULE, GELATIN COATED ORAL at 05:34

## 2018-07-06 RX ADMIN — LATANOPROST 1 DROP: 50 SOLUTION OPHTHALMIC at 20:57

## 2018-07-06 RX ADMIN — VANCOMYCIN HYDROCHLORIDE 125 MG: KIT at 11:12

## 2018-07-06 RX ADMIN — ACETAMINOPHEN 650 MG: 325 TABLET, FILM COATED ORAL at 20:56

## 2018-07-06 RX ADMIN — TIMOLOL MALEATE 1 DROP: 5 SOLUTION/ DROPS OPHTHALMIC at 20:57

## 2018-07-06 RX ADMIN — POTASSIUM CHLORIDE AND SODIUM CHLORIDE 50 ML/HR: 900; 150 INJECTION, SOLUTION INTRAVENOUS at 05:34

## 2018-07-06 RX ADMIN — DORZOLAMIDE HYDROCHLORIDE 1 DROP: 20 SOLUTION OPHTHALMIC at 20:57

## 2018-07-06 RX ADMIN — DORZOLAMIDE HYDROCHLORIDE 1 DROP: 20 SOLUTION OPHTHALMIC at 08:46

## 2018-07-06 RX ADMIN — PENTOSAN POLYSULFATE SODIUM 100 MG: 100 CAPSULE, GELATIN COATED ORAL at 17:22

## 2018-07-06 RX ADMIN — ATORVASTATIN CALCIUM 10 MG: 10 TABLET, FILM COATED ORAL at 20:56

## 2018-07-06 RX ADMIN — VANCOMYCIN HYDROCHLORIDE 125 MG: KIT at 17:22

## 2018-07-06 NOTE — PROGRESS NOTES
[facility] Gastroenterology  Inpatient Progress Note  Cristiane Harris  11/13/1929 7/6/2018  Reason for Follow Up:  Cdiff and E coli/anemia with bleeding nodule    Subjective     Subjective:   Pt is AAOx3 with daughter at bedside. States that she does feel better. She is tolerating diet and less diarrhea still with loose stool.  NO fever chills or sweats. No abdominal pain.    Current Facility-Administered Medications:   •  acetaminophen (TYLENOL) tablet 650 mg, 650 mg, Oral, Q6H PRN, Migdalia Solano DO, 650 mg at 07/05/18 2116  •  atorvastatin (LIPITOR) tablet 10 mg, 10 mg, Oral, Nightly, Mayela Hartmann MD, 10 mg at 07/05/18 2025  •  diltiaZEM CD (CARDIZEM CD) 24 hr capsule 120 mg, 120 mg, Oral, Daily, Mayela Hartmann MD, 120 mg at 07/06/18 0845  •  dorzolamide (TRUSOPT) 2 % ophthalmic solution 1 drop, 1 drop, Both Eyes, BID, EARNEST Santos, 1 drop at 07/06/18 0846  •  latanoprost (XALATAN) 0.005 % ophthalmic solution 1 drop, 1 drop, Both Eyes, Nightly, EARNEST Santos, 1 drop at 07/05/18 2030  •  ondansetron (ZOFRAN) injection 4 mg, 4 mg, Intravenous, Q6H PRN, Mayela Hartmann MD, 4 mg at 07/02/18 2335  •  pantoprazole (PROTONIX) EC tablet 40 mg, 40 mg, Oral, Q AM, Latoya Dozier MD, 40 mg at 07/06/18 0534  •  pentosan polysulfate (ELMIRON) capsule 100 mg, 100 mg, Oral, BID AC, Mayela Hartmann MD, 100 mg at 07/06/18 0534  •  sodium chloride 0.9 % flush 1-10 mL, 1-10 mL, Intravenous, PRN, Mayela Hartmann MD  •  Insert peripheral IV, , , Once **AND** sodium chloride 0.9 % flush 10 mL, 10 mL, Intravenous, PRN, Nicko Seth MD  •  timolol (TIMOPTIC) 0.5 % ophthalmic solution 1 drop, 1 drop, Both Eyes, Q12H, Ministerio Terrell MD, 1 drop at 07/06/18 0846  •  vancomycin oral solution 125 mg, 125 mg, Oral, Q6H, Ministerio Terrell MD, 125 mg at 07/06/18 0534    Review of Systems:    Review of Systems   Constitutional: Negative for activity change, appetite change, chills,  diaphoresis, fatigue, fever and unexpected weight change.   HENT: Negative for ear pain, hearing loss, mouth sores, sore throat, trouble swallowing and voice change.    Eyes: Negative.    Respiratory: Negative for cough, choking, shortness of breath and wheezing.    Cardiovascular: Negative for chest pain and palpitations.   Gastrointestinal: Positive for diarrhea. Negative for abdominal pain, blood in stool, constipation, nausea and vomiting.   Endocrine: Negative for cold intolerance and heat intolerance.   Genitourinary: Negative for decreased urine volume, dysuria, frequency, hematuria and urgency.   Musculoskeletal: Negative for back pain, gait problem and myalgias.   Skin: Negative for color change, pallor and rash.   Allergic/Immunologic: Negative for food allergies and immunocompromised state.   Neurological: Negative for dizziness, tremors, seizures, syncope, weakness, light-headedness, numbness and headaches.   Hematological: Negative for adenopathy. Does not bruise/bleed easily.   Psychiatric/Behavioral: Negative for agitation and confusion. The patient is not nervous/anxious.    All other systems reviewed and are negative.       Objective     Vital Signs  Temp:  [97.5 °F (36.4 °C)-98.8 °F (37.1 °C)] 97.9 °F (36.6 °C)  Heart Rate:  [56-90] 63  Resp:  [16-18] 18  BP: (121-166)/(55-78) 129/58  Body mass index is 17.89 kg/m².    Intake/Output Summary (Last 24 hours) at 07/06/18 1109  Last data filed at 07/06/18 0958   Gross per 24 hour   Intake           1792.4 ml   Output                0 ml   Net           1792.4 ml     I/O this shift:  In: 404.4 [P.O.:240; I.V.:164.4]  Out: -        Physical Exam:   General: patient awake, alert and cooperative, no acute distress   Eyes: Normal lids and lashes, no scleral icterus   Neck: supple, no JVD   Skin: warm and dry   Cardiovascular: regular rhythm and rate, no murmurs auscultated   Pulm: clear to auscultation bilaterally, regular and unlabored   Abdomen: soft,  nontender, nondistended; normal bowel sounds   Psychiatric: Normal mood and behavior; converses appropriately     Results Review:    I have reviewed all of the patients current test results      Results from last 7 days  Lab Units 07/06/18  0624 07/05/18  2351 07/05/18  1606  07/05/18  0622  07/04/18  0512 07/03/18  0025   WBC 10*3/mm3  --   --   --   --  5.21  --  4.05*  --  4.11*   HEMOGLOBIN g/dL 9.5* 8.8* 10.2*  < > 6.7*  < > 7.0*  < > 7.1*   HEMATOCRIT % 28.1* 25.7* 30.8*  < > 20.1*  < > 21.7*  < > 21.3*   PLATELETS 10*3/mm3  --   --   --   --  338  --  324  --  308   < > = values in this interval not displayed.      Results from last 7 days  Lab Units 07/05/18  0622 07/04/18  1624 07/04/18 0512 07/03/18  0025 07/02/18  1833   SODIUM mmol/L 138  --  138 136 137   POTASSIUM mmol/L 3.4* 3.1* 2.8* 3.4* 2.6*   CHLORIDE mmol/L 110  --  109 106 104   CO2 mmol/L 18.0*  --  18.0* 20.0* 20.0*   BUN mg/dL 8  --  11 14 14   CREATININE mg/dL 0.64  --  0.73 0.76 0.76   CALCIUM mg/dL 8.5  --  8.3* 8.0* 8.3*   BILIRUBIN mg/dL  --   --   --  0.2 0.2   ALK PHOS U/L  --   --   --  44 46   ALT (SGPT) U/L  --   --   --  32 28   AST (SGOT) U/L  --   --   --  35 32   GLUCOSE mg/dL 94  --  77 110* 114*         Results from last 7 days  Lab Units 07/02/18  1833   INR  1.37*         Lab Results  Lab Value Date/Time   LIPASE <10 (L) 07/02/2018 1833       Radiology:    Imaging Results (last 24 hours)     ** No results found for the last 24 hours. **            Assessment/Plan     Patient Active Problem List   Diagnosis Code   • Essential hypertension I10   • Atherosclerosis of both carotid arteries I65.23   • Chest pain syndrome R07.9   • Hyperlipidemia LDL goal <100 E78.5   • Bradycardia on ECG R00.1   • Preoperative cardiovascular examination Z01.810   • Anemia D64.9   • Nausea and vomiting R11.2       1. Cdiff, salmonella, Ecoli-cont Vancomycin   2. Endoscopy 7/3/2018 papule with bleeding clips applied await path    Cont current  active treatment await bx from Endoscopy.     EMR Dragon/transcription disclaimer: Much of this encounter note is electronic transcription/translation of spoken language to printed text. The electronic translation of spoken language may be erroneous, or at times, nonsensical words or phrases may be inadvertently transcribed. Although I have reviewed the note for such errors, some may still exist.    EARNEST Carey  07/06/18  11:09 AM    I performed a history, physical examination, reviewed the progress note/consult note, and discussed the management of this patient with EARNEST Jo as her supervising physician.  I agree with the documented findings and plan of care as outlined with any exceptions or new recommendations noted below.    No bleeding noted. Path Is pending.    EMR Dragon/transcription disclaimer: Much of this encounter note is an electronic transcription/translation of spoken language to printed text.  The electronic translation of spoken language may permit erroneous, or at times, nonsensical words or phrases to be inadvertently transcribed.  Although I have reviewed the note for such errors, some may still exist.      Petros Epperson MD  1:07 PM  7/6/2018

## 2018-07-06 NOTE — PROGRESS NOTES
[facility]  INPATIENT PROGRESS NOTE    Length of Stay: 4  Date of Admission: 7/2/2018  Primary Care Physician: Julius Daniel MD    Subjective   Chief Complaint: diarrhea, stool more formed today  HPI   Sitting up on side of bed.  Wants to take a shower.  Daughter in room.  Stools have started to decrease.  She had one stool last night and has had 2 stools this morning.  Stools are light in color and are starting to be formed.  She reports mild lower abdominal pain.  No nausea or vomiting.  Abdominal cramping improved.  Staples removed from right knee yesterday.  Less reddened in the area.  Discussed potential discharge.  She feels as though she needs to stay today and make sure that stools are becoming formed.  She hopes to feel better and able for discharge tomorrow.    Review of Systems   Constitutional: Positive for activity change and appetite change.   HENT: Negative for congestion and trouble swallowing.    Eyes: Negative for photophobia and visual disturbance.   Respiratory: Negative for cough, shortness of breath and wheezing.    Cardiovascular: Negative for chest pain, palpitations and leg swelling.   Gastrointestinal: Positive for abdominal pain (cramping lower abdome improved), diarrhea (improving starting to have form to stool) and nausea. Negative for vomiting.   Endocrine: Negative for cold intolerance, heat intolerance and polyuria.   Genitourinary: Negative for dysuria.   Musculoskeletal: Positive for gait problem (recent right knee surgery).   Skin: Positive for wound (Right knee surgical incision).   Allergic/Immunologic: Negative for immunocompromised state.   Neurological: Positive for weakness.   Hematological: Negative for adenopathy. Does not bruise/bleed easily.   Psychiatric/Behavioral: Negative for agitation, behavioral problems and confusion.   All pertinent negatives and positives are as above. All other systems have been reviewed and  are negative unless otherwise stated.     Objective    Temp:  [97.5 °F (36.4 °C)-98.8 °F (37.1 °C)] 98.3 °F (36.8 °C)  Heart Rate:  [56-90] 63  Resp:  [16-18] 18  BP: (121-166)/(55-78) 159/67  Physical Exam  Constitutional: She is oriented to person, place, and time. She appears well-developed and well-nourished.   HENT:   Head: Normocephalic and atraumatic.   Eyes: EOM are normal. Pupils are equal, round, and reactive to light.   Neck: Normal range of motion. Neck supple.   Cardiovascular: Normal rate, regular rhythm, normal heart sounds and intact distal pulses.  Exam reveals no friction rub.    No murmur heard.  Pulmonary/Chest: Effort normal and breath sounds normal. No respiratory distress. She has no wheezes. She has no rales.   Abdominal: Soft. Bowel sounds are normal. There is tenderness (Mild).   Genitourinary:   Genitourinary Comments: Voiding   Musculoskeletal: She exhibits no edema or deformity.   Neurological: She is alert and oriented to person, place, and time.   Skin: Skin is warm and dry.   Skin staples removed from right knee 7/5. Minimal erythema knee less than yesterday.  Psychiatric: She has a normal mood and affect. Her behavior is normal. Judgment and thought content normal.      Results Review:  I have reviewed the labs, radiology results, and diagnostic studies.    Laboratory Data:     Results from last 7 days  Lab Units 07/06/18  0624 07/05/18  2351 07/05/18  1606  07/05/18  0622  07/04/18  0512  07/03/18  0025   WBC 10*3/mm3  --   --   --   --  5.21  --  4.05*  --  4.11*   HEMOGLOBIN g/dL 9.5* 8.8* 10.2*  < > 6.7*  < > 7.0*  < > 7.1*   HEMATOCRIT % 28.1* 25.7* 30.8*  < > 20.1*  < > 21.7*  < > 21.3*   PLATELETS 10*3/mm3  --   --   --   --  338  --  324  --  308   < > = values in this interval not displayed.       Results from last 7 days  Lab Units 07/05/18  0622 07/04/18  1624 07/04/18  0512 07/03/18  0025 07/02/18  1833   SODIUM mmol/L 138  --  138 136 137   POTASSIUM mmol/L 3.4* 3.1*  2.8* 3.4* 2.6*   CHLORIDE mmol/L 110  --  109 106 104   CO2 mmol/L 18.0*  --  18.0* 20.0* 20.0*   BUN mg/dL 8  --  11 14 14   CREATININE mg/dL 0.64  --  0.73 0.76 0.76   CALCIUM mg/dL 8.5  --  8.3* 8.0* 8.3*   BILIRUBIN mg/dL  --   --   --  0.2 0.2   ALK PHOS U/L  --   --   --  44 46   ALT (SGPT) U/L  --   --   --  32 28   AST (SGOT) U/L  --   --   --  35 32   GLUCOSE mg/dL 94  --  77 110* 114*     Blood Culture   Date Value Ref Range Status   07/02/2018 No growth at 3 days  Preliminary   07/02/2018 No growth at 3 days  Preliminary     Stool Culture   Date Value Ref Range Status   07/03/2018 Heavy growth (4+) Salmonella species (A)  Preliminary     Campylobacter Not Detected     Clostridium difficile (toxin A/B) Detected (A)    Comment:   Due to the high asymptomatic carriage rates, especially in young children, the clinical relevance of the detection of toxigenic C. difficile from stool should be considered in the context of other clinical findings, patient age, and risk factors including hospitalization and antibiotic exposure.       Plesiomonas shigelloides Not Detected    Salmonella Detected (A)    Vibrio Not Detected    Vibrio cholerae Not Detected    Yersinia enterocolitica Not Detected    Enteroaggregative E. coli (EAEC) Not Detected    Enteropathogenic E. coli (EPEC) Detected (A)    Enterotoxigenic E. coli (ETEC) lt/st Not Detected    Shiga-like toxin-producing E. coli (STEC) stx1/stx2 Not Detected    E. coli O157 Not Detected    Shigella/Enteroinvasive E. coli (EIEC) Not Detected    Cryptosporidium Not Detected    Cyclospora cayetanensis Not Detected    Entamoeba histolytica Not Detected    Giardia lamblia Not Detected    Adenovirus F40/41 Not Detected    Astrovirus Not Detected    Norovirus GI/GII Not Detected    Rotavirus A Not Detected    Sapovirus (I, II, IV or V) Not Detected     Imaging Results (all)     Procedure Component Value Units Date/Time    XR Chest 1 View [[id number]] Collected:  07/03/18 0719      Updated:  07/03/18 0746    Narrative:       XR CHEST 1 VW- 7/3/2018 3:12 AM CDT     HISTORY: leukopenia; D64.9-Anemia, unspecified; K92.2-Gastrointestinal  hemorrhage, unspecified; E87.6-Hypokalemia; R19.7-Diarrhea, unspecified;  R11.2-Nausea with vomiting, unspecified; K59.39-Other megacolon       COMPARISON: None.     FINDINGS:   The lungs are clear. The cardiomediastinal silhouette and pulmonary  vascularity are within normal limits.      The osseous structures and surrounding soft tissues demonstrate no acute  abnormality. Surgical clips are seen in the neck.       Impression:       1. No radiographic evidence of acute cardiopulmonary process.        This report was finalized on 07/03/2018 07:43 by Dr. Corey Rodriguez MD.    XR Abdomen KUB [[id number]] Collected:  07/02/18 1812     Updated:  07/02/18 1816    Narrative:       XR ABDOMEN KUB- 7/2/2018 5:56 PM CDT     HISTORY: diarrhea       COMPARISON: None     FINDINGS:  There is a nonobstructive bowel gas pattern. Moderate colonic stool with  mild dilatation of the ascending colon up to 6.3 cm. Small bowel appears  decompressed. Extensive calcification of the costal cartilages. Splenic  artery calcification. Suture material noted in the left upper quadrant.  Evaluation for free intraperitoneal air is limited on a supine  radiograph, but there are no definite findings of free intraperitoneal  air.      The osseous structures demonstrate no acute abnormality. 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       Impression:       1. No radiographic evidence of acute abdominopelvic process. Bowel gas  pattern is nonobstructive.  2. Moderate colonic stool with mild dilatation of the ascending colon up  to 6.3 cm.        This report was finalized on 07/02/2018 18:13 by Dr Sandip Fox, .          Intake/Output    Intake/Output Summary (Last 24 hours) at 07/06/18 1020  Last data filed at 07/06/18 0958   Gross per 24 hour   Intake           1792.4 ml    Output                0 ml   Net           1792.4 ml       Scheduled Meds    atorvastatin 10 mg Oral Nightly   diltiaZEM  mg Oral Daily   dorzolamide 1 drop Both Eyes BID   latanoprost 1 drop Both Eyes Nightly   pantoprazole 40 mg Oral Q AM   pentosan polysulfate 100 mg Oral BID AC   timolol 1 drop Both Eyes Q12H   vancomycin 125 mg Oral Q6H       I have reviewed the patient current medications.     Assessment/Plan     Hospital Problem List     Anemia    Nausea and vomiting    Overview Signed 7/3/2018 12:05 PM by EARNEST Ny     Added automatically from request for surgery [id number]             Assessment:  1.  Clostridium Difficile diarrhea, salmonella, Enteropathogenic Escherichia coli positive  2.  Nausea with vomiting, improved  3.  Abdominal pain, generalized, improved  4.  Chronic normocytic anemia with iron deficiency, iron less than 10  5.  Blood loss anemia with fecal occult positive required transfusion  6.  Bleeding papule 13 mm stomach clipped per endo 7/3/18  7.  Hypokalemia  8.  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  Essential hypertension  11.  Hyperlipidemia    Plan:  1.  GI panel positive for Clostridium difficile, salmonella, Escherichia coli.  2.  Vancomycin oral day 3. Has completed 10 doses so far.  3.  Normal saline with 20 mEq of potassium at 50 mL per hour. Will discontinue  4.  H/H 6.7/20.1.  Transfuse 2 units packed red blood cellls 7/5. H/H 9.5/28/1today.  5.  Decrease H/H to every 12 hours.  6.  Colonoscopy normal May 2018.  No plans to repeat colonoscopy at this time per GI medicine.  7.  Endoscopy 7/3 papule with bleeding, clips applied to achieve hemostasis.  8.   completed IV Venofer ×3 days.  9.  Protonix oral  10.  Staples were removed right knee surgical incision 7/5.  11.  SCDs for deep vein thrombosis prophylaxis  12.  Regular diet as tolerated  13.   Physical therapy, ambulate as tolerated.  14.  Have discussed will need to continue oral vancomycin after discharge.    The above documentation resulted from a face-to-face encounter by me Marisol TINOCO, [facility].      Discharge Planning: I expect patient to be discharged to home tomorrow.    EARNEST Garcia   07/06/18   10:20 AM    I personally evaluated and examined the patient in conjunction with EARNEST Preciado and agree with the assessment, treatment plan, and disposition of the patient as recorded by her. My history, exam, and further recommendations are: Patient making steady improvement.  She is starting to have more formed bowel movements, with less frequency.  Tolerating oral vancomycin without problem.  Tolerating her diet well.  His work with physical therapy today.  Staples are out on the right knee, and she reports continued improvement from this perspective.  Has completed 3 days of IV then if her.  Anticipate home over the weekend pending the above.  Updated family at bedside.        Ministerio Terrell MD  07/06/18  3:45 PM

## 2018-07-06 NOTE — PLAN OF CARE
Problem: Patient Care Overview  Goal: Plan of Care Review  Outcome: Ongoing (interventions implemented as appropriate)   07/06/18 1428   Coping/Psychosocial   Plan of Care Reviewed With patient;daughter;family   Plan of Care Review   Progress improving   OTHER   Outcome Summary As of 1430 today, the pt has had 2 BM. Voids per BSC. IVF d/c'd. Oral vanc continues. Hgb: 9.5. H&H q12h. VSS. Will continue to monitor.       Problem: Fall Risk (Adult)  Goal: Absence of Fall  Outcome: Ongoing (interventions implemented as appropriate)   07/06/18 1428   Fall Risk (Adult)   Absence of Fall making progress toward outcome       Problem: Skin Injury Risk (Adult)  Goal: Skin Health and Integrity  Outcome: Ongoing (interventions implemented as appropriate)   07/06/18 1428   Skin Injury Risk (Adult)   Skin Health and Integrity making progress toward outcome       Problem: Nutrition, Imbalanced: Inadequate Oral Intake (Adult)  Goal: Improved Oral Intake  Outcome: Ongoing (interventions implemented as appropriate)   07/06/18 1428   Nutrition, Imbalanced: Inadequate Oral Intake (Adult)   Improved Oral Intake making progress toward outcome     Goal: Prevent Further Weight Loss  Outcome: Ongoing (interventions implemented as appropriate)   07/06/18 1428   Nutrition, Imbalanced: Inadequate Oral Intake (Adult)   Prevent Further Weight Loss making progress toward outcome

## 2018-07-06 NOTE — PLAN OF CARE
Problem: Patient Care Overview  Goal: Plan of Care Review  Outcome: Ongoing (interventions implemented as appropriate)   07/06/18 0359   Coping/Psychosocial   Plan of Care Reviewed With patient   Plan of Care Review   Progress no change   OTHER   Outcome Summary Pt. has only had one BM so far this shift; voiding per BSC; IVF continue; oral vancomycin; tylenol x1 for c/o knee pain.     Goal: Individualization and Mutuality  Outcome: Ongoing (interventions implemented as appropriate)    Goal: Discharge Needs Assessment  Outcome: Ongoing (interventions implemented as appropriate)    Goal: Interprofessional Rounds/Family Conf  Outcome: Ongoing (interventions implemented as appropriate)      Problem: Fall Risk (Adult)  Goal: Identify Related Risk Factors and Signs and Symptoms  Outcome: Outcome(s) achieved Date Met: 07/06/18    Goal: Absence of Fall  Outcome: Ongoing (interventions implemented as appropriate)      Problem: Skin Injury Risk (Adult)  Goal: Identify Related Risk Factors and Signs and Symptoms  Outcome: Outcome(s) achieved Date Met: 07/06/18    Goal: Skin Health and Integrity  Outcome: Ongoing (interventions implemented as appropriate)      Problem: Nutrition, Imbalanced: Inadequate Oral Intake (Adult)  Goal: Improved Oral Intake  Outcome: Ongoing (interventions implemented as appropriate)    Goal: Prevent Further Weight Loss  Outcome: Ongoing (interventions implemented as appropriate)

## 2018-07-07 VITALS
HEIGHT: 62 IN | DIASTOLIC BLOOD PRESSURE: 52 MMHG | HEART RATE: 61 BPM | BODY MASS INDEX: 18 KG/M2 | RESPIRATION RATE: 16 BRPM | TEMPERATURE: 98.8 F | OXYGEN SATURATION: 98 % | WEIGHT: 97.8 LBS | SYSTOLIC BLOOD PRESSURE: 131 MMHG

## 2018-07-07 PROBLEM — A04.72 CLOSTRIDIUM DIFFICILE COLITIS: Status: ACTIVE | Noted: 2018-07-07

## 2018-07-07 PROBLEM — D64.9 ANEMIA: Chronic | Status: ACTIVE | Noted: 2018-07-02

## 2018-07-07 LAB
BACTERIA SPEC AEROBE CULT: NORMAL
BACTERIA SPEC AEROBE CULT: NORMAL
HCT VFR BLD AUTO: 30.3 % (ref 37–47)
HGB BLD-MCNC: 10 G/DL (ref 12–16)

## 2018-07-07 PROCEDURE — 85014 HEMATOCRIT: CPT | Performed by: NURSE PRACTITIONER

## 2018-07-07 PROCEDURE — 97116 GAIT TRAINING THERAPY: CPT

## 2018-07-07 PROCEDURE — 85018 HEMOGLOBIN: CPT | Performed by: NURSE PRACTITIONER

## 2018-07-07 PROCEDURE — 99232 SBSQ HOSP IP/OBS MODERATE 35: CPT | Performed by: INTERNAL MEDICINE

## 2018-07-07 RX ORDER — POTASSIUM CHLORIDE 750 MG/1
40 CAPSULE, EXTENDED RELEASE ORAL ONCE
Status: COMPLETED | OUTPATIENT
Start: 2018-07-07 | End: 2018-07-07

## 2018-07-07 RX ORDER — SACCHAROMYCES BOULARDII 250 MG
250 CAPSULE ORAL 2 TIMES DAILY
Qty: 14 CAPSULE | Refills: 0 | Status: SHIPPED | OUTPATIENT
Start: 2018-07-07 | End: 2020-07-29

## 2018-07-07 RX ADMIN — DILTIAZEM HYDROCHLORIDE 120 MG: 120 CAPSULE, COATED, EXTENDED RELEASE ORAL at 08:39

## 2018-07-07 RX ADMIN — VANCOMYCIN HYDROCHLORIDE 125 MG: KIT at 00:54

## 2018-07-07 RX ADMIN — VANCOMYCIN HYDROCHLORIDE 125 MG: KIT at 06:07

## 2018-07-07 RX ADMIN — PANTOPRAZOLE SODIUM 40 MG: 40 TABLET, DELAYED RELEASE ORAL at 06:07

## 2018-07-07 RX ADMIN — DORZOLAMIDE HYDROCHLORIDE 1 DROP: 20 SOLUTION OPHTHALMIC at 08:39

## 2018-07-07 RX ADMIN — VANCOMYCIN HYDROCHLORIDE 125 MG: KIT at 11:48

## 2018-07-07 RX ADMIN — TIMOLOL MALEATE 1 DROP: 5 SOLUTION/ DROPS OPHTHALMIC at 08:38

## 2018-07-07 RX ADMIN — POTASSIUM CHLORIDE 40 MEQ: 750 CAPSULE, EXTENDED RELEASE ORAL at 11:48

## 2018-07-07 RX ADMIN — PENTOSAN POLYSULFATE SODIUM 100 MG: 100 CAPSULE, GELATIN COATED ORAL at 06:07

## 2018-07-07 NOTE — PLAN OF CARE
Problem: Patient Care Overview  Goal: Plan of Care Review  Outcome: Ongoing (interventions implemented as appropriate)   07/07/18 0148   Coping/Psychosocial   Plan of Care Reviewed With patient   Plan of Care Review   Progress no change   OTHER   Outcome Summary Pt. denies pain; no BMs so far this shift; voiding per BSC; IIDx1; adequate PO intake; will monitor.     Goal: Individualization and Mutuality  Outcome: Ongoing (interventions implemented as appropriate)    Goal: Discharge Needs Assessment  Outcome: Ongoing (interventions implemented as appropriate)    Goal: Interprofessional Rounds/Family Conf  Outcome: Ongoing (interventions implemented as appropriate)      Problem: Fall Risk (Adult)  Goal: Absence of Fall  Outcome: Ongoing (interventions implemented as appropriate)      Problem: Skin Injury Risk (Adult)  Goal: Skin Health and Integrity  Outcome: Ongoing (interventions implemented as appropriate)      Problem: Nutrition, Imbalanced: Inadequate Oral Intake (Adult)  Goal: Improved Oral Intake  Outcome: Ongoing (interventions implemented as appropriate)    Goal: Prevent Further Weight Loss  Outcome: Ongoing (interventions implemented as appropriate)

## 2018-07-07 NOTE — DISCHARGE SUMMARY
[facility] Medicine Services  DISCHARGE SUMMARY       Date of Admission: 7/2/2018  Date of Discharge:  7/7/2018  Primary Care Physician: Thong Landin MD    Discharge Diagnoses:  Hospital Problem List     * (Principal)Clostridium difficile colitis    Anemia (Chronic)    Overview Signed 7/7/2018 10:23 AM by EARNEST No     Iron deficiency with iron < 10         Nausea and vomiting    Overview Signed 7/3/2018 12:05 PM by EARNEST Ny     Added automatically from request for surgery [id number]             Discharge diagnoses   1.  Clostridium Difficile diarrhea, salmonella, Enteropathogenic Escherichia coli positive  2.  Nausea with vomiting, resolved  3.  Abdominal pain, generalized, improved  4.  Chronic normocytic anemia with iron deficiency, iron less than 10  5.  Blood loss anemia with fecal occult positive required transfusion  6.  Bleeding papule 13 mm stomach clipped per endo 7/3/18, benign mucosa with mixed inflammation biopsy 7/3/18  7.  Hypokalemia  8.  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  Essential hypertension  11.  Hyperlipidemia  Presenting Problem/History of Present Illness:  Anemia, unspecified type [D64.9]     Chief Complaint on Day of Discharge: Feels better, stool more formed, less abdominal discomfort  History of Present Illness on Day of Discharge:   Sitting up in chair.  Tolerating regular diet.  Denies nausea, vomiting.  Minimal abdominal discomfort.  She denies chest pain, palpitations or shortness of breath.  Stools are now more formed.  She has had 3 stools since late yesterday afternoon.  She ambulated 350 feet with physical therapy.    Consults: Dr. Dozier, Dr. Epperson, gastroenterology    Procedures Performed:   Endoscopy 7/3/18  The esophagus was normal.     A single 13 mm papule (nodule) with contact bleeding was found at the  anastomosis. Biopsies were  taken with a cold forceps for histology. It bled more after the biopsy. To control bleeding after the  biopsy, two hemostatic clips were successfully placed (MR conditional). There was no bleeding at the  end of the maneuver. The patient has had a prior Whipple procedure. The anastomosis lead directly  into the jejunum. The mucosa was normal.    Pertinent Test Results:     Results from last 7 days  Lab Units 07/07/18  0734 07/06/18 2012 07/06/18  0624  07/05/18  0622 07/04/18  0512 07/03/18  0025   WBC 10*3/mm3  --   --   --   --  5.21  --  4.05*  --  4.11*   HEMOGLOBIN g/dL 10.0* 9.8* 9.5*  < > 6.7*  < > 7.0*  < > 7.1*   HEMATOCRIT % 30.3* 29.7* 28.1*  < > 20.1*  < > 21.7*  < > 21.3*   PLATELETS 10*3/mm3  --   --   --   --  338  --  324  --  308   < > = values in this interval not displayed.       Results from last 7 days  Lab Units 07/06/18  1115 07/05/18  0622 07/04/18  1624 07/04/18  0512 07/03/18  0025 07/02/18  1833   SODIUM mmol/L 138 138  --  138 136 137   POTASSIUM mmol/L 3.4* 3.4* 3.1* 2.8* 3.4* 2.6*   CHLORIDE mmol/L 104 110  --  109 106 104   CO2 mmol/L 24.0 18.0*  --  18.0* 20.0* 20.0*   BUN mg/dL 7 8  --  11 14 14   CREATININE mg/dL 0.67 0.64  --  0.73 0.76 0.76   CALCIUM mg/dL 8.9 8.5  --  8.3* 8.0* 8.3*   BILIRUBIN mg/dL  --   --   --   --  0.2 0.2   ALK PHOS U/L  --   --   --   --  44 46   ALT (SGPT) U/L  --   --   --   --  32 28   AST (SGOT) U/L  --   --   --   --  35 32   GLUCOSE mg/dL 87 94  --  77 110* 114*     Gastrointestinal Panel, PCR - Stool, Per Rectum [[id number]] (Abnormal) Collected: 07/03/18 1006   Lab Status: Final result Specimen: Stool from Per Rectum Updated: 07/03/18 1334    Campylobacter Not Detected    Clostridium difficile (toxin A/B) Detected (A)    Plesiomonas shigelloides Not Detected    Salmonella Detected (A)    Vibrio Not Detected    Vibrio cholerae Not Detected    Yersinia enterocolitica Not Detected    Enteroaggregative E. coli (EAEC) Not  Detected    Enteropathogenic E. coli (EPEC) Detected (A)    Enterotoxigenic E. coli (ETEC) lt/st Not Detected    Shiga-like toxin-producing E. coli (STEC) stx1/stx2 Not Detected    E. coli O157 Not Detected    Shigella/Enteroinvasive E. coli (EIEC) Not Detected    Cryptosporidium Not Detected    Cyclospora cayetanensis Not Detected    Entamoeba histolytica Not Detected    Giardia lamblia Not Detected    Adenovirus F40/41 Not Detected    Astrovirus Not Detected    Norovirus GI/GII Not Detected    Rotavirus A Not Detected    Sapovirus (I, II, IV or V) Not Detected   Stool Culture, Targeted - Stool, Per Rectum [[id number]] (Abnormal) Collected: 07/03/18 1006   Lab Status: Preliminary result Specimen: Stool from Per Rectum Updated: 07/05/18 1057    Stool Culture Heavy growth (4+) Salmonella species (A)   Vitamin B12 [[id number]] (Abnormal) Collected: 07/03/18 0819   Lab Status: Final result Specimen: Blood Updated: 07/03/18 1004    Vitamin B-12 995 (H) pg/mL    Folate [[id number]] Collected: 07/03/18 0819   Lab Status: Final result Specimen: Blood Updated: 07/03/18 1004    Folate >20.00 ng/mL    Ferritin [[id number]] (Normal) Collected: 07/03/18 0819   Lab Status: Final result Specimen: Blood Updated: 07/03/18 0934    Ferritin 47.90 ng/mL    Iron Profile [[id number]] (Abnormal) Collected: 07/03/18 0819   Lab Status: Final result Specimen: Blood Updated: 07/03/18 0923    Iron <10 (L) mcg/dL     TIBC 293 mcg/dL     Iron Saturation -- %    Urinalysis With Culture If Indicated - Urine, Clean Catch [[id number]] (Abnormal) Collected: 07/03/18 0643   Lab Status: Final result Specimen: Urine from Urine, Clean Catch Updated: 07/03/18 0712    Color, UA Yellow    Appearance, UA Clear    pH, UA <=5.0    Specific Gravity, UA 1.018    Glucose, UA Negative    Ketones, UA Trace (A)    Bilirubin, UA Negative    Blood, UA Negative    Protein, UA Trace (A)    Leuk Esterase, UA Small (1+) (A)    Nitrite, UA Negative    Urobilinogen, UA 0.2  E.U./dL   Urinalysis, Microscopic Only - Urine, Clean Catch [[id number]] (Abnormal) Collected: 07/03/18 0643   Lab Status: Final result Specimen: Urine from Urine, Clean Catch Updated: 07/03/18 0712    RBC, UA 6-12 (A) /HPF     WBC, UA 3-5 (A) /HPF     Bacteria, UA None Seen /HPF     Squamous Epithelial Cells, UA 0-2 /HPF     Hyaline Casts, UA 3-6 /LPF     Methodology Automated Microscopy     Magnesium [[id number]] (Normal) Collected: 07/03/18 0025   Lab Status: Final result Specimen: Blood Updated: 07/03/18 0054    Magnesium 1.7 mg/dL    Phosphorus [[id number]] (Normal) Collected: 07/03/18 0025   Lab Status: Final result Specimen: Blood Updated: 07/03/18 0054    Phosphorus 2.5 mg/dL      Magnesium [[id number]] (Normal) Collected: 07/03/18 0025   Lab Status: Final result Specimen: Blood Updated: 07/03/18 0054    Magnesium 1.7 mg/dL    Phosphorus [[id number]] (Normal) Collected: 07/03/18 0025   Lab Status: Final result Specimen: Blood Updated: 07/03/18 0054    Phosphorus 2.5 mg/dL      Culture Data:   Blood Culture   Date Value Ref Range Status   07/02/2018 No growth at 4 days  Preliminary   07/02/2018 No growth at 4 days  Preliminary     Stool Culture   Date Value Ref Range Status   07/03/2018 Heavy growth (4+) Salmonella species (A)  Preliminary     Final Diagnosis   Stomach, nodule at gastric anastomosis, biopsy:  Benign glandular mucosa with severe mixed inflammation (see comment).  Negative for malignancy.        Comment: The patient's history of a gastrointestinal stromal tumor status post Whipple procedure is noted.     Imaging Results (all)     Procedure Component Value Units Date/Time    XR Chest 1 View [[id number]] Collected:  07/03/18 0743     Updated:  07/03/18 0746    Narrative:       XR CHEST 1 VW- 7/3/2018 3:12 AM CDT     HISTORY: leukopenia; D64.9-Anemia, unspecified; K92.2-Gastrointestinal  hemorrhage, unspecified; E87.6-Hypokalemia; R19.7-Diarrhea, unspecified;  R11.2-Nausea with vomiting, unspecified;  K59.39-Other megacolon       COMPARISON: None.     FINDINGS:   The lungs are clear. The cardiomediastinal silhouette and pulmonary  vascularity are within normal limits.      The osseous structures and surrounding soft tissues demonstrate no acute  abnormality. Surgical clips are seen in the neck.       Impression:       1. No radiographic evidence of acute cardiopulmonary process.        This report was finalized on 07/03/2018 07:43 by Dr. Corey Rodriguez MD.    XR Abdomen KUB [[id number]] Collected:  07/02/18 1812     Updated:  07/02/18 1816    Narrative:       XR ABDOMEN KUB- 7/2/2018 5:56 PM CDT     HISTORY: diarrhea       COMPARISON: None     FINDINGS:  There is a nonobstructive bowel gas pattern. Moderate colonic stool with  mild dilatation of the ascending colon up to 6.3 cm. Small bowel appears  decompressed. Extensive calcification of the costal cartilages. Splenic  artery calcification. Suture material noted in the left upper quadrant.  Evaluation for free intraperitoneal air is limited on a supine  radiograph, but there are no definite findings of free intraperitoneal  air.      The osseous structures demonstrate no acute abnormality. 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       Impression:       1. No radiographic evidence of acute abdominopelvic process. Bowel gas  pattern is nonobstructive.  2. Moderate colonic stool with mild dilatation of the ascending colon up  to 6.3 cm.        This report was finalized on 07/02/2018 18:13 by Dr Sandip Fox, .        Hospital Course  Patient is a 88 y.o. female presented to [facility] emergency room 7/2/18 with complaints of multiple episodes of diarrhea and vomiting that started 2 days prior to admission.  She reported at least 20 episodes of loose, watery, nonbloody stools.  She denied fever.  She reported multiple episodes of nonbilious and nonbloody emesis.  She reported mild abdominal pain.  She denied fever or any recent sick  contact or recent travel history.  She had right total knee arthroplasty on 6/20/18 in [facility].  She has been taking aspirin 650 mg twice daily since her surgery on June 20.  She reported generalized weakness and had been using Pedialyte at home without any improvement in symptoms.  Hemoglobin 7.3, hematocrit 22.3, WBC 4.72, potassium 2.6, creatinine 0.76.  KUB abdomen reported no radiographic evidence of acute abdominopelvic process.  Bowel gas pattern nonobstructive.  Moderate colonic stool with mild dilatation of the ascending colon up to 6.3 cm.  She received normal saline fluid bolus and potassium IV in the emergency room.    She was admitted to the medical floor with acute anemia concern for GI bleed, hypokalemia.  She was made nothing by mouth.  Serial H/H were monitored.  She was placed on Protonix drip.  Electrolytes monitored and replaced.  No antibiotics initiated initially.  Temperature 100.1 maximum.  She continued to have multiple episodes of watery diarrhea.  She was seen in consultation by Dr. Dozier from gastroenterology.  Her impression included diarrhea likely consistent with Clostridium difficile given recent history of antibiotics.  GI panel ordered and was positive for Clostridium difficile, Salmonella, and enteropathogenic Escherichia coli.  She was placed on vancomycin 125 mg orally every 6 hours ×10 days.  On 7/3/18 Dr. Dozier performed endoscopy with results normal esophagus.  A single 13 mm papule (nodule) with contact bleeding was found at the anastomosis.  Biopsies were taken with cold forceps.  Bleeding after the biopsy.  To control bleeding after the biopsy to hemostatic clips were successfully placed.  No active bleeding at the end of maneuver.  Patient has history of prior Whipple procedure.  The anastomosis led directly to the jejunum.  The mucosa was normal.  She continued to have generalized abdominal cramping which eventually lessened.  She began having less frequent stools  after vancomycin started.  Stool started to have consistency and began to be formed.  She had 3 stools from the afternoon prior to discharge to the morning of discharge.  Diet advanced as tolerated.    She presented with anemia with hemoglobin 7.3, hematocrit 22.3.  H/H monitored.  Hemoglobin decreased to 6.7 with hematocrit 20.1 on 7/5.  She was transfused 2 units packed red blood cells.  Hemoglobin 10.2 posttransfusion and has remained stable.  By date of discharge most recent hemoglobin 10 with hematocrit 30.3.    Iron panel noted iron less than 10, ferritin 47.9, total iron binding capacity 293.  She received IV Venofer ×3 days.  Recommend follow-up iron panel within 4-6 weeks.  She may require additional IV Venofer transfusions on an outpatient basis.  We will defer to primary care provider to follow after discharge.    Potassium 2.6 upon admission.  She received supplemental potassium replacement.  She remained with potassium around 3.4 most of hospitalization.  By date of discharge potassium 3.4 and she received 40 mEq of potassium orally prior to discharge.    She had recent right total knee arthroplasty 6/20/18 in [place], [place].  Staples were discontinued on 7/5/18 as previously recommended.  She had minimal erythema 1 cm center of knee incision that essentially resolved after Staples removed.  No drainage from incision noted.    Physical therapy consulted.  Patient was able to ambulate 350 feet in the moore prior to discharge.  No gait instability noted.    On 7/7/18 she is medically stable for discharge.  She is tolerating regular diet.  Minimal abdominal discomfort.  Stool consistency now more formed and less frequent.  She denies nausea or vomiting.  She is ambulating.  She has been evaluated by Hospital medicine and has been seen by Dr. Epperson with gastroenterology prior to discharge.  Results of pathology report discussed with patient by Dr. Epperson.  Have arranged follow-up appointment with her  "primary care provider Dr. Landin on 7/12/18.    Physical Exam on Discharge:  /67 (BP Location: Left arm, Patient Position: Sitting)   Pulse 68   Temp 97.8 °F (36.6 °C) (Oral)   Resp 14   Ht 157.5 cm (62\")   Wt 44.4 kg (97 lb 12.8 oz)   SpO2 96%   BMI 17.89 kg/m²   Physical Exam  Constitutional: She is oriented to person, place, and time. She appears well-developed and well-nourished.   HENT:   Head: Normocephalic and atraumatic.   Eyes: EOM are normal. Pupils are equal, round, and reactive to light.   Neck: Normal range of motion. Neck supple.   Cardiovascular: Normal rate, regular rhythm, normal heart sounds and intact distal pulses.  Exam reveals no friction rub.    No murmur heard.  Pulmonary/Chest: Effort normal and breath sounds normal. No respiratory distress. She has no wheezes. She has no rales.   Abdominal: Soft. Bowel sounds are normal. There is tenderness (Mild).   Genitourinary:   Genitourinary Comments: Voiding   Musculoskeletal: She exhibits no edema or deformity.   Neurological: She is alert and oriented to person, place, and time.   Skin: Skin is warm and dry.   Skin staples removed from right knee 7/5. Erythema resolved.  Psychiatric: She has a normal mood and affect. Her behavior is normal. Judgment and thought content normal.      Condition on Discharge:  Stable    Discharge Disposition:  Home with family    Discharge Diet:   Diet Instructions     Advance Diet As Tolerated          as tolerated    Activity at Discharge:   Activity Instructions     Activity as Tolerated          as tolerated    Discharge Care Plan / Instructions:   1.  Complete vancomycin 125 mg orally  every 6 hours for total of 10 days.  2.  Should she have worsening returning symptoms diarrhea and abdominal pain she should seek medical attention.  3.  Follow-up CBC with return appointment with primary care provider.  4.  Recommend follow-up iron profile panel 1 month.    Discharge Medications:     Discharge " Medications      New Medications      Instructions Start Date   saccharomyces boulardii 250 MG capsule  Commonly known as:  FLORASTOR   250 mg, Oral, 2 Times Daily      vancomycin 50 MG/ML solution oral solution   125 mg, Oral, Every 6 Hours Scheduled         Continue These Medications      Instructions Start Date   aspirin 81 MG tablet   81 mg, Oral, Daily      diltiaZEM  MG 24 hr capsule  Commonly known as:  CARDIZEM CD   120 mg, Oral, Daily      dorzolamide 2 % ophthalmic solution  Commonly known as:  TRUSOPT   1 drop, Both Eyes, 2 Times Daily      ELMIRON 100 MG capsule  Generic drug:  pentosan polysulfate   Oral, 2 Times Daily      GLEEVEC 400 MG chemo tablet  Generic drug:  imatinib   200 mg, Oral, Daily      GLUCOSAMINE PO   1 tablet, Oral, Daily      MICARDIS 40 MG tablet  Generic drug:  telmisartan   40 mg, Oral, Daily      MULTIVITAMIN PO   1 tablet, Oral, Daily      omeprazole 20 MG capsule  Commonly known as:  priLOSEC   20 mg, Oral, Daily      simvastatin 20 MG tablet  Commonly known as:  ZOCOR   20 mg, Oral, Nightly      timolol 0.5 % ophthalmic solution  Commonly known as:  TIMOPTIC   1 drop, Both Eyes, 2 Times Daily      TRAVATAN Z 0.004 % solution ophthalmic solution  Generic drug:  travoprost (RUBÉN free)   1 drop, Both Eyes, Daily         Stop These Medications    esomeprazole 40 MG capsule  Commonly known as:  nexIUM     TRAMADOL HCL PO          Follow-up Appointments:   Follow-up Information     Thong Landin MD Follow up.    Specialty:  Family Medicine  Why:  7/12/18 at 11:00  Contact information:  [facility] [address]  [phone]                 Test Results Pending at Discharge: none    The above documentation resulted from a face-to-face encounter by me Marisol TINOCO, [facility].    EARNEST Garcia  07/07/18  10:29 AM    Time:  This discharge process required 40 minutes for completion.     Plan discussed with Dr. Terrell, Dr. Epperson, and  patient.    Time spent in face-to-face evaluation, chart review, planning and education 40 minutes.  Please note that portions of this note may have been completed with a voice recognition program. Efforts were made to edit the dictations, but occasionally words are mistranscribed.    I personally evaluated and examined the patient in conjunction with EARNEST Preciado and agree with the assessment, treatment plan, and disposition of the patient as recorded by her. My history, exam, and further recommendations are: Case was discussed with Dr. Epperson of gastroenterology.  Patient will complete a course of oral vancomycin on an outpatient basis.  Clinically, patient has made significant improvement during this hospitalization.  She was able to ambulate approximately 350 feet with physical therapy.  She will continue to ambulate frequently on an outpatient basis, especially as we have held her aspirin/antiplatelet therapy given her initial presentation which included anemia requiring transfusion of packed red blood cells.  Agree with discharge plan as outlined by Marisol Donnelly above.        Ministerio Terrell MD  07/07/18  10:47 AM

## 2018-07-07 NOTE — PROGRESS NOTES
[facility] Gastroenterology  Inpatient Progress Note  Cristiane Harris  11/13/1929 7/7/2018  Reason for Follow Up:  Cdiff, Ecoli, anemia with bleeding nodule    Subjective     Subjective:   Pt is awake alert oriented up in chair eating breakfast. Tolerating diet. No nausea or vomiting. She says that her stools are more formed x3 in the past 12 hours.     Current Facility-Administered Medications:   •  acetaminophen (TYLENOL) tablet 650 mg, 650 mg, Oral, Q6H PRN, Migdalia Solano DO, 650 mg at 07/06/18 2056  •  atorvastatin (LIPITOR) tablet 10 mg, 10 mg, Oral, Nightly, Mayela Hartmann MD, 10 mg at 07/06/18 2056  •  diltiaZEM CD (CARDIZEM CD) 24 hr capsule 120 mg, 120 mg, Oral, Daily, Mayela Hartmann MD, 120 mg at 07/07/18 0839  •  dorzolamide (TRUSOPT) 2 % ophthalmic solution 1 drop, 1 drop, Both Eyes, BID, EARNEST Santos, 1 drop at 07/07/18 0839  •  latanoprost (XALATAN) 0.005 % ophthalmic solution 1 drop, 1 drop, Both Eyes, Nightly, EARNEST Santos, 1 drop at 07/06/18 2057  •  ondansetron (ZOFRAN) injection 4 mg, 4 mg, Intravenous, Q6H PRN, Mayela Hartmann MD, 4 mg at 07/02/18 2335  •  pantoprazole (PROTONIX) EC tablet 40 mg, 40 mg, Oral, Q AM, Latoya Dozier MD, 40 mg at 07/07/18 0607  •  pentosan polysulfate (ELMIRON) capsule 100 mg, 100 mg, Oral, BID AC, Mayela Hartmann MD, 100 mg at 07/07/18 0607  •  sodium chloride 0.9 % flush 1-10 mL, 1-10 mL, Intravenous, PRN, Mayela Hartmann MD  •  Insert peripheral IV, , , Once **AND** sodium chloride 0.9 % flush 10 mL, 10 mL, Intravenous, PRN, Nicko Seth MD  •  timolol (TIMOPTIC) 0.5 % ophthalmic solution 1 drop, 1 drop, Both Eyes, Q12H, Ministerio Terrell MD, 1 drop at 07/07/18 0838  •  vancomycin oral solution 125 mg, 125 mg, Oral, Q6H, Ministerio Terrell MD, 125 mg at 07/07/18 0607    Review of Systems:    Review of Systems   Constitutional: Negative for activity change, appetite change, chills, diaphoresis, fatigue, fever  and unexpected weight change.   HENT: Negative for ear pain, hearing loss, mouth sores, sore throat, trouble swallowing and voice change.    Eyes: Negative.    Respiratory: Negative for cough, choking, shortness of breath and wheezing.    Cardiovascular: Negative for chest pain and palpitations.   Gastrointestinal: Positive for diarrhea. Negative for abdominal pain, blood in stool, constipation, nausea and vomiting.   Endocrine: Negative for cold intolerance and heat intolerance.   Genitourinary: Negative for decreased urine volume, dysuria, frequency, hematuria and urgency.   Musculoskeletal: Negative for back pain, gait problem and myalgias.   Skin: Negative for color change, pallor and rash.   Allergic/Immunologic: Negative for food allergies and immunocompromised state.   Neurological: Negative for dizziness, tremors, seizures, syncope, weakness, light-headedness, numbness and headaches.   Hematological: Negative for adenopathy. Does not bruise/bleed easily.   Psychiatric/Behavioral: Negative for agitation and confusion. The patient is not nervous/anxious.    All other systems reviewed and are negative.       Objective     Vital Signs  Temp:  [97.5 °F (36.4 °C)-98 °F (36.7 °C)] 97.8 °F (36.6 °C)  Heart Rate:  [60-68] 68  Resp:  [14-18] 14  BP: (128-147)/(58-68) 128/67  Body mass index is 17.89 kg/m².    Intake/Output Summary (Last 24 hours) at 07/07/18 0919  Last data filed at 07/07/18 0531   Gross per 24 hour   Intake              360 ml   Output                0 ml   Net              360 ml     No intake/output data recorded.       Physical Exam:   General: patient awake, alert and cooperative, no acute distress   Eyes: Normal lids and lashes, no scleral icterus   Neck: supple, no JVD   Skin: warm and dry   Cardiovascular: regular rhythm and rate, no murmurs auscultated   Pulm: clear to auscultation bilaterally, regular and unlabored   Abdomen: soft, nontender, nondistended; normal bowel sounds   Psychiatric:  Normal mood and behavior; converses appropriately     Results Review:    I have reviewed all of the patients current test results      Results from last 7 days  Lab Units 07/07/18  0734 07/06/18 2012 07/06/18 0624 07/05/18 0622 07/04/18 0512 07/03/18  0025   WBC 10*3/mm3  --   --   --   --  5.21  --  4.05*  --  4.11*   HEMOGLOBIN g/dL 10.0* 9.8* 9.5*  < > 6.7*  < > 7.0*  < > 7.1*   HEMATOCRIT % 30.3* 29.7* 28.1*  < > 20.1*  < > 21.7*  < > 21.3*   PLATELETS 10*3/mm3  --   --   --   --  338  --  324  --  308   < > = values in this interval not displayed.      Results from last 7 days  Lab Units 07/06/18  1115 07/05/18  0622 07/04/18  1624 07/04/18 0512 07/03/18  0025 07/02/18  1833   SODIUM mmol/L 138 138  --  138 136 137   POTASSIUM mmol/L 3.4* 3.4* 3.1* 2.8* 3.4* 2.6*   CHLORIDE mmol/L 104 110  --  109 106 104   CO2 mmol/L 24.0 18.0*  --  18.0* 20.0* 20.0*   BUN mg/dL 7 8  --  11 14 14   CREATININE mg/dL 0.67 0.64  --  0.73 0.76 0.76   CALCIUM mg/dL 8.9 8.5  --  8.3* 8.0* 8.3*   BILIRUBIN mg/dL  --   --   --   --  0.2 0.2   ALK PHOS U/L  --   --   --   --  44 46   ALT (SGPT) U/L  --   --   --   --  32 28   AST (SGOT) U/L  --   --   --   --  35 32   GLUCOSE mg/dL 87 94  --  77 110* 114*         Results from last 7 days  Lab Units 07/02/18  1833   INR  1.37*         Lab Results  Lab Value Date/Time   LIPASE <10 (L) 07/02/2018 1833       Radiology:    Imaging Results (last 24 hours)     ** No results found for the last 24 hours. **            Assessment/Plan     Patient Active Problem List   Diagnosis Code   • Essential hypertension I10   • Atherosclerosis of both carotid arteries I65.23   • Chest pain syndrome R07.9   • Hyperlipidemia LDL goal <100 E78.5   • Bradycardia on ECG R00.1   • Preoperative cardiovascular examination Z01.810   • Anemia D64.9   • Nausea and vomiting R11.2       1. Cdiff, salmonella, Ecoli-cont Vancomycin diarrhea improving  2. Endoscopy 7/3/2018 with bleeding nodule clips placed path  revealed benign glandular mucosa with severe mixed inflammation negative for malignancy.     Cont current nothing new to add. Hopefully home soon.     EMR Dragon/transcription disclaimer: Much of this encounter note is electronic transcription/translation of spoken language to printed text. The electronic translation of spoken language may be erroneous, or at times, nonsensical words or phrases may be inadvertently transcribed. Although I have reviewed the note for such errors, some may still exist.    EARNEST Carey  07/07/18  9:19 AM      I performed a history, physical examination, reviewed the progress note/consult note, and discussed the management of this patient with EARNEST Jo as her supervising physician.  I agree with the documented findings and plan of care as outlined with any exceptions or new recommendations noted below.    Pathology consistent with granulation tissue.  Associated with the anastomosis.  Nothing to suggest recurrence of malignancy.  I think she is okay for discharge home.  Should avoid nonsteroidals.  Continue treatment of her C. difficile as an outpatient.    EMR Dragon/transcription disclaimer: Much of this encounter note is an electronic transcription/translation of spoken language to printed text.  The electronic translation of spoken language may permit erroneous, or at times, nonsensical words or phrases to be inadvertently transcribed.  Although I have reviewed the note for such errors, some may still exist.      Petros Epperson MD  9:46 AM  7/7/2018

## 2018-07-07 NOTE — PLAN OF CARE
Problem: Patient Care Overview  Goal: Plan of Care Review  Outcome: Ongoing (interventions implemented as appropriate)   07/07/18 0885   Coping/Psychosocial   Plan of Care Reviewed With patient   Plan of Care Review   Progress improving   OTHER   Outcome Summary patient walked 350 ft with Rwx with supervision, denies pain, reports decreased diarrhea

## 2018-07-08 NOTE — THERAPY DISCHARGE NOTE
Acute Care - Physical Therapy Discharge Summary  [facility]       Patient Name: Cristiane Harris  : 1929  MRN: [id number]    Today's Date: 2018  Onset of Illness/Injury or Date of Surgery: 18    Date of Referral to PT: 18  Referring Physician: BERNARDO Chavarria      Admit Date: 2018      PT Recommendation and Plan    Visit Dx:    ICD-10-CM ICD-9-CM   1. Anemia, unspecified type D64.9 285.9   2. Gastrointestinal hemorrhage, unspecified gastrointestinal hemorrhage type K92.2 578.9   3. Hypokalemia E87.6 276.8   4. Diarrhea, unspecified type R19.7 787.91   5. Nausea and vomiting, intractability of vomiting not specified, unspecified vomiting type R11.2 787.01   6. Dilatation of colon K59.39 564.7   7. Impaired mobility and ADLs Z74.09 799.89             Outcome Measures     Row Name 18 0800 18 1500 18 1600       How much help from another person do you currently need...    Turning from your back to your side while in flat bed without using bedrails? 4  -EC 4  -NW 4  -CW    Moving from lying on back to sitting on the side of a flat bed without bedrails? 4  -EC 4  -NW 4  -CW    Moving to and from a bed to a chair (including a wheelchair)? 4  -EC 4  -NW 4  -CW    Standing up from a chair using your arms (e.g., wheelchair, bedside chair)? 4  -EC 4  -NW 4  -CW    Climbing 3-5 steps with a railing? 3  -EC 3  -NW 3  -CW    To walk in hospital room? 4  -EC 3  -NW 3  -CW    AM-PAC 6 Clicks Score 23  -EC 22  -NW 22  -CW       Functional Assessment    Outcome Measure Options AM-PAC 6 Clicks Basic Mobility (PT)  -EC AM-PAC 6 Clicks Basic Mobility (PT)  -NW AM-PAC 6 Clicks Basic Mobility (PT)  -CW      User Key  (r) = Recorded By, (t) = Taken By, (c) = Cosigned By    Initials Name Provider Type    EC Ashish Munoz, IVAN Physical Therapy Assistant    CW Joy L Golden, PTA Physical Therapy Assistant    OSWALDO Denton, PTA Physical Therapy Assistant            Therapy Suggested Charges      Code   Minutes Charges    75516 (CPT®) Hc Pt Neuromusc Re Education Ea 15 Min      90657 (CPT®) Hc Pt Ther Proc Ea 15 Min 15 1    87069 (CPT®) Hc Gait Training Ea 15 Min 15 1    57257 (CPT®) Hc Pt Therapeutic Act Ea 15 Min      64800 (CPT®) Hc Pt Manual Therapy Ea 15 Min      14770 (CPT®) Hc Pt Iontophoresis Ea 15 Min      27275 (CPT®) Hc Pt Elec Stim Ea-Per 15 Min      61506 (CPT®) Hc Pt Ultrasound Ea 15 Min      98615 (CPT®) Hc Pt Self Care/Mgmt/Train Ea 15 Min      Total  30 2                PT Rehab Goals     Row Name 07/08/18 0651             Gait Training Goal 1 (PT)    Activity/Assistive Device (Gait Training Goal 1, PT) gait (walking locomotion);assistive device use;improve balance and speed;increase endurance/gait distance  -AH      Curry Level (Gait Training Goal 1, PT) supervision required  -AH      Distance (Gait Goal 1, PT) 300  -AH      Time Frame (Gait Training Goal 1, PT) long term goal (LTG);by discharge  -AH      Barriers (Gait Training Goal 1, PT) none  -AH      Progress/Outcome (Gait Training Goal 1, PT) goal met  -AH         ROM Goal 1 (PT)    ROM Goal 1 (PT) Right knee PROM 0-120 deg  -AH      Time Frame (ROM Goal 1, PT) long term goal (LTG);by discharge  -AH      Barriers (ROM Goal 1, PT) phys  -AH      Progress/Outcome (ROM Goal 1, PT) goal not met  -AH        User Key  (r) = Recorded By, (t) = Taken By, (c) = Cosigned By    Initials Name Provider Type Discipline     Slime Hamilton PTA Physical Therapy Assistant PT              PT Discharge Summary  Reason for Discharge: Discharge from facility  Outcomes Achieved: Refer to plan of care for updates on goals achieved  Discharge Destination: Home      Slime Hamilton PTA   7/8/2018

## 2018-07-09 LAB — BACTERIA SPEC AEROBE CULT: ABNORMAL

## 2019-07-02 ENCOUNTER — OFFICE VISIT (OUTPATIENT)
Dept: CARDIOLOGY | Facility: CLINIC | Age: 84
End: 2019-07-02

## 2019-07-02 VITALS
WEIGHT: 103.4 LBS | BODY MASS INDEX: 19.03 KG/M2 | OXYGEN SATURATION: 99 % | HEIGHT: 62 IN | HEART RATE: 64 BPM | DIASTOLIC BLOOD PRESSURE: 72 MMHG | SYSTOLIC BLOOD PRESSURE: 124 MMHG

## 2019-07-02 DIAGNOSIS — I65.23 ATHEROSCLEROSIS OF BOTH CAROTID ARTERIES: ICD-10-CM

## 2019-07-02 DIAGNOSIS — E78.5 HYPERLIPIDEMIA LDL GOAL <100: Primary | ICD-10-CM

## 2019-07-02 DIAGNOSIS — I10 ESSENTIAL HYPERTENSION: ICD-10-CM

## 2019-07-02 DIAGNOSIS — I25.10 CORONARY ARTERY DISEASE INVOLVING NATIVE CORONARY ARTERY OF NATIVE HEART WITHOUT ANGINA PECTORIS: ICD-10-CM

## 2019-07-02 PROBLEM — R00.1 BRADYCARDIA ON ECG: Status: RESOLVED | Noted: 2017-09-12 | Resolved: 2019-07-02

## 2019-07-02 PROCEDURE — 99214 OFFICE O/P EST MOD 30 MIN: CPT | Performed by: NURSE PRACTITIONER

## 2019-07-02 RX ORDER — DILTIAZEM HYDROCHLORIDE 120 MG/1
120 TABLET, FILM COATED ORAL DAILY
COMMUNITY
End: 2020-07-29 | Stop reason: SDUPTHER

## 2019-07-02 RX ORDER — TELMISARTAN 80 MG/1
80 TABLET ORAL DAILY
Qty: 90 TABLET | Refills: 1 | Status: SHIPPED | OUTPATIENT
Start: 2019-07-02 | End: 2020-07-29

## 2019-07-02 NOTE — ASSESSMENT & PLAN NOTE
· If patient develops anginal symptoms she is to contact us and we will repeat stress test  · Continue aspirin 81 mg daily

## 2020-07-29 ENCOUNTER — OFFICE VISIT (OUTPATIENT)
Dept: CARDIOLOGY | Facility: CLINIC | Age: 85
End: 2020-07-29

## 2020-07-29 VITALS
HEART RATE: 64 BPM | SYSTOLIC BLOOD PRESSURE: 148 MMHG | OXYGEN SATURATION: 99 % | BODY MASS INDEX: 19.14 KG/M2 | DIASTOLIC BLOOD PRESSURE: 70 MMHG | WEIGHT: 108 LBS | HEIGHT: 63 IN

## 2020-07-29 DIAGNOSIS — I65.23 ATHEROSCLEROSIS OF BOTH CAROTID ARTERIES: ICD-10-CM

## 2020-07-29 DIAGNOSIS — E78.5 HYPERLIPIDEMIA LDL GOAL <100: ICD-10-CM

## 2020-07-29 DIAGNOSIS — I10 ESSENTIAL HYPERTENSION: ICD-10-CM

## 2020-07-29 DIAGNOSIS — I25.10 CORONARY ARTERY DISEASE INVOLVING NATIVE CORONARY ARTERY OF NATIVE HEART WITHOUT ANGINA PECTORIS: Primary | ICD-10-CM

## 2020-07-29 PROBLEM — Z01.810 PREOPERATIVE CARDIOVASCULAR EXAMINATION: Status: RESOLVED | Noted: 2018-06-05 | Resolved: 2020-07-29

## 2020-07-29 PROCEDURE — 99214 OFFICE O/P EST MOD 30 MIN: CPT | Performed by: INTERNAL MEDICINE

## 2020-07-29 RX ORDER — LOSARTAN POTASSIUM 100 MG/1
100 TABLET ORAL DAILY
COMMUNITY
End: 2022-05-31

## 2020-07-29 RX ORDER — DILTIAZEM HYDROCHLORIDE 120 MG/1
120 TABLET, FILM COATED ORAL DAILY
Start: 2020-07-29 | End: 2022-05-31

## 2020-07-29 RX ORDER — SIMVASTATIN 20 MG
20 TABLET ORAL NIGHTLY
Start: 2020-07-29 | End: 2022-05-31 | Stop reason: SDUPTHER

## 2020-07-29 NOTE — ASSESSMENT & PLAN NOTE
· Mildly elevated today's visit  · If blood pressure remains elevated or rated cardia becomes symptomatic, consider transitioning diltiazem to amlodipine

## 2020-07-29 NOTE — PROGRESS NOTES
[facility]  Office Visit Note    DATE: 07/29/2020    IDENTIFICATION: Cristiane Harris is a 89 y.o. female who resides in [place], [place].    REASON FOR VISIT:  • Coronary artery disease  • Carotid stenosis  • Cardiac risk factors            Cristiane Harris presents to my office for follow-up of her coronary artery disease, carotid stenosis, and cardiac risk factors.  Patient is doing well from cardiac standpoint.  She recently returned from [place], [place] after being stuck.  Due to the coronavirus pandemic.  While there, she was walking with her friend on a daily basis and denies any angina or shortness of breath symptoms.  A recent PCP visit, her heart rate was noted to be in the 40s and her diltiazem dose was decreased from 180 to 120 mg daily.  Patient denies palpitations, TIA, stroke symptoms.    Review of Systems   Constitution: Negative for malaise/fatigue.   Eyes: Negative for vision loss in left eye and vision loss in right eye.   Cardiovascular: Negative for chest pain, dyspnea on exertion, near-syncope, orthopnea, palpitations, paroxysmal nocturnal dyspnea and syncope.   Musculoskeletal: Negative for myalgias.   Neurological: Negative for brief paralysis, excessive daytime sleepiness, focal weakness, numbness, paresthesias and weakness.   All other systems reviewed and are negative.      The patient's past medical, social, family history and ROS reviewed in the patient's electronic medical record.    No Known Allergies      Current Outpatient Medications:   •  aspirin 81 MG tablet, Take 81 mg by mouth Daily., Disp: , Rfl:   •  dilTIAZem (CARDIZEM) 120 MG tablet, Take 1 tablet by mouth Daily., Disp: , Rfl:   •  dorzolamide (TRUSOPT) 2 % ophthalmic solution, Administer 1 drop to both eyes 2 (Two) Times a Day., Disp: , Rfl:   •  imatinib (GLEEVEC) 400 MG chemo tablet, Take 100 mg by mouth Daily., Disp: , Rfl:   •  losartan (COZAAR) 100 MG tablet, Take 100 mg by  "mouth Daily., Disp: , Rfl:   •  Multiple Vitamins-Minerals (MULTIVITAMIN PO), Take 1 tablet by mouth Daily., Disp: , Rfl:   •  omeprazole (PriLOSEC) 20 MG capsule, Take 20 mg by mouth As Needed., Disp: , Rfl:   •  pentosan polysulfate (ELMIRON) 100 MG capsule, Take  by mouth 2 (two) times a day., Disp: , Rfl:   •  simvastatin (ZOCOR) 20 MG tablet, Take 1 tablet by mouth Every Night., Disp: , Rfl:   •  timolol (TIMOPTIC) 0.5 % ophthalmic solution, Administer 1 drop to both eyes 2 (Two) Times a Day., Disp: , Rfl:   •  TRAVATAN Z 0.004 % solution ophthalmic solution, Administer 1 drop to both eyes Daily., Disp: , Rfl:     Past Medical History:   Diagnosis Date   • Anemia    • Arthritis    • GERD (gastroesophageal reflux disease)    • GIST (gastrointestinal stromal tumor), non-malignant    • Hyperlipemia    • Hypertension    • Skin cancer     squamous cell   • Stenosis of carotid artery        Past Surgical History:   Procedure Laterality Date   • BREAST SURGERY  2011   • CAROTID ENDARTERECTOMY Left    • CATARACT EXTRACTION W/ INTRAOCULAR LENS IMPLANT     • ENDOSCOPY N/A 7/3/2018    Procedure: ESOPHAGOGASTRODUODENOSCOPY WITH ANESTHESIA;  Surgeon: Latoya Dozier MD;  Location: [facility] ENDOSCOPY;  Service: Gastroenterology   • JOINT REPLACEMENT     • OTHER SURGICAL HISTORY      Facial Surgery - Skin cancer removed   • THROMBOENDARTERECTOMY Right 11/20/2012    carotid   • TOTAL KNEE ARTHROPLASTY Right    • TUMOR REMOVAL      GASTROINTESTINAL STOMA TUMOR   • WHIPPLE PROCEDURE         Family History   Problem Relation Age of Onset   • Cancer Mother    • Aneurysm Father         of abdominal aorta   • Coronary artery disease Father    • Diabetes Father    • Cancer Sister        Social History     Tobacco Use   • Smoking status: Never Smoker   • Smokeless tobacco: Never Used   Substance Use Topics   • Alcohol use: No           Blood pressure 148/70, pulse 64, height 160 cm (63\"), weight 49 kg (108 lb), SpO2 99 %.  Body mass " index is 19.13 kg/m².  Vitals:    07/29/20 1346   Patient Position: Sitting       Physical Exam   Constitutional: She is oriented to person, place, and time. She appears well-developed and well-nourished.   HENT:   Head: Normocephalic and atraumatic.   Eyes: Pupils are equal, round, and reactive to light. No scleral icterus.   Neck: No JVD present. Carotid bruit is not present. No thyromegaly present.   Cardiovascular: Normal rate, regular rhythm, S1 normal and S2 normal. Exam reveals no gallop.   No murmur heard.  Pulmonary/Chest: Effort normal and breath sounds normal.   Abdominal: Soft. She exhibits no mass. There is no hepatosplenomegaly. There is no tenderness.   Neurological: She is alert and oriented to person, place, and time.   Skin: Skin is warm and dry. No cyanosis. Nails show no clubbing.   Psychiatric: She has a normal mood and affect. Her behavior is normal.       Data Review (reviewed with patient):     Procedures    Lab Results   Component Value Date    GLUCOSE 87 07/06/2018    BUN 7 07/06/2018    CREATININE 0.67 07/06/2018    EGFRIFNONA 83 07/06/2018    BCR 10.4 07/06/2018    K 3.4 (L) 07/06/2018    CO2 24.0 07/06/2018    CALCIUM 8.9 07/06/2018    ALBUMIN 2.70 (L) 07/03/2018    ALKPHOS 44 07/03/2018    AST 35 07/03/2018    ALT 32 07/03/2018       Lab Results   Component Value Date    WBC 5.21 07/05/2018    HGB 10.0 (L) 07/07/2018    HCT 30.3 (L) 07/07/2018    MCV 93.9 07/05/2018     07/05/2018             Problem List Items Addressed This Visit        Cardiology Problems    Coronary artery disease involving native coronary artery of native heart without angina pectoris - Primary    Overview     · Cardiac catheterization (1993): reportedly normal.   · Echocardiogram (8/18/2014): LVEF 65%. Mild MR. RVSP 29 mmHg.  · Exercise nuclear stress  (8/18/2014): Negative for ischemia. LVEF 74%.  · Nuclear stress test (10/5/2017): Fixed inferior lateral infarct versus attenuation artifact.  Normal LVEF.  Excellent exercise capacity. Expected exercise duration = 3:45, Actual = 7:37 coronary calcification noted.         Current Assessment & Plan     · No angina  · Continue low-dose aspirin and statin therapy         Relevant Medications    dilTIAZem (CARDIZEM) 120 MG tablet    Atherosclerosis of both carotid arteries    Overview     · Right CEA by João Willams, 2012  · Left CEA by João Willams, 2015   · Carotid ultrasound (08/28/2017): Mild carotid disease bilaterally         Current Assessment & Plan     · No TIA or stroke symptoms  · Repeat carotid ultrasound in 1 year         Relevant Orders    Duplex Carotid Ultrasound CAR    Essential hypertension    Overview     • Target blood pressure <130/80 mmHg         Current Assessment & Plan     · Mildly elevated today's visit  · If blood pressure remains elevated or rated cardia becomes symptomatic, consider transitioning diltiazem to amlodipine         Relevant Medications    losartan (COZAAR) 100 MG tablet    dilTIAZem (CARDIZEM) 120 MG tablet    Hyperlipidemia LDL goal <100    Overview     · Moderate intensity statin therapy reasonable given presence of CAD/carotid disease         Current Assessment & Plan     · Continue simvastatin         Relevant Medications    simvastatin (ZOCOR) 20 MG tablet               · Continue present medical therapy  · Consider changing diltiazem to amlodipine if bradycardia becomes symptomatic or blood pressure not controlled  · Carotid duplex in 1 year  Return in about 1 year (around 7/29/2021) for Follow-up with Psychiatric only.      PK Espinoza MD [facility]  Interventional and General Cardiology      7/29/2020

## 2021-05-03 DIAGNOSIS — I25.10 CORONARY ARTERY DISEASE INVOLVING NATIVE CORONARY ARTERY OF NATIVE HEART WITHOUT ANGINA PECTORIS: ICD-10-CM

## 2021-05-03 DIAGNOSIS — I65.23 ATHEROSCLEROSIS OF BOTH CAROTID ARTERIES: Primary | ICD-10-CM

## 2022-04-25 ENCOUNTER — TELEPHONE (OUTPATIENT)
Dept: FAMILY MEDICINE CLINIC | Facility: CLINIC | Age: 87
End: 2022-04-25

## 2022-04-25 DIAGNOSIS — E78.5 HYPERLIPIDEMIA LDL GOAL <100: Primary | ICD-10-CM

## 2022-04-25 DIAGNOSIS — D53.9 DEFICIENCY ANEMIA: ICD-10-CM

## 2022-04-25 DIAGNOSIS — E11.43 TYPE 2 DIABETES MELLITUS WITH DIABETIC AUTONOMIC NEUROPATHY, WITHOUT LONG-TERM CURRENT USE OF INSULIN: ICD-10-CM

## 2022-04-25 DIAGNOSIS — E55.9 VITAMIN D DEFICIENCY: ICD-10-CM

## 2022-04-25 DIAGNOSIS — R53.83 FATIGUE, UNSPECIFIED TYPE: ICD-10-CM

## 2022-04-25 DIAGNOSIS — Z79.899 HIGH RISK MEDICATIONS (NOT ANTICOAGULANTS) LONG-TERM USE: ICD-10-CM

## 2022-04-25 NOTE — TELEPHONE ENCOUNTER
Patient would like to get a full blood panel order for her as she is scheduled for her annual on 05/31/2022. She has made a lab appointment for 05/25/2022.    Please advise and if there is any questions or concerns the patient may be reached at [phone]

## 2022-05-25 ENCOUNTER — LAB (OUTPATIENT)
Dept: FAMILY MEDICINE CLINIC | Facility: CLINIC | Age: 87
End: 2022-05-25

## 2022-05-25 DIAGNOSIS — E55.9 VITAMIN D DEFICIENCY: ICD-10-CM

## 2022-05-25 DIAGNOSIS — D53.9 DEFICIENCY ANEMIA: ICD-10-CM

## 2022-05-25 DIAGNOSIS — Z79.899 HIGH RISK MEDICATIONS (NOT ANTICOAGULANTS) LONG-TERM USE: ICD-10-CM

## 2022-05-25 DIAGNOSIS — R53.83 FATIGUE, UNSPECIFIED TYPE: ICD-10-CM

## 2022-05-25 DIAGNOSIS — E78.5 HYPERLIPIDEMIA LDL GOAL <100: ICD-10-CM

## 2022-05-25 DIAGNOSIS — E11.43 TYPE 2 DIABETES MELLITUS WITH DIABETIC AUTONOMIC NEUROPATHY, WITHOUT LONG-TERM CURRENT USE OF INSULIN: ICD-10-CM

## 2022-05-25 PROCEDURE — 36415 COLL VENOUS BLD VENIPUNCTURE: CPT | Performed by: FAMILY MEDICINE

## 2022-05-26 LAB
25(OH)D3+25(OH)D2 SERPL-MCNC: 36.4 NG/ML (ref 30–100)
ALBUMIN SERPL-MCNC: 4.3 G/DL (ref 3.5–4.6)
ALBUMIN/GLOB SERPL: 2.2 {RATIO} (ref 1.2–2.2)
ALP SERPL-CCNC: 75 IU/L (ref 44–121)
ALT SERPL-CCNC: 28 IU/L (ref 0–32)
AST SERPL-CCNC: 38 IU/L (ref 0–40)
BASOPHILS # BLD AUTO: 0 X10E3/UL (ref 0–0.2)
BASOPHILS NFR BLD AUTO: 1 %
BILIRUB SERPL-MCNC: 0.3 MG/DL (ref 0–1.2)
BUN SERPL-MCNC: 22 MG/DL (ref 10–36)
BUN/CREAT SERPL: 19 (ref 12–28)
CALCIUM SERPL-MCNC: 9.5 MG/DL (ref 8.7–10.3)
CHLORIDE SERPL-SCNC: 101 MMOL/L (ref 96–106)
CHOLEST SERPL-MCNC: 114 MG/DL (ref 100–199)
CO2 SERPL-SCNC: 22 MMOL/L (ref 20–29)
CREAT SERPL-MCNC: 1.18 MG/DL (ref 0.57–1)
EGFRCR SERPLBLD CKD-EPI 2021: 43 ML/MIN/1.73
EOSINOPHIL # BLD AUTO: 0.1 X10E3/UL (ref 0–0.4)
EOSINOPHIL NFR BLD AUTO: 3 %
ERYTHROCYTE [DISTWIDTH] IN BLOOD BY AUTOMATED COUNT: 11.8 % (ref 11.7–15.4)
FERRITIN SERPL-MCNC: 37 NG/ML (ref 15–150)
FOLATE SERPL-MCNC: >20 NG/ML
GLOBULIN SER CALC-MCNC: 2 G/DL (ref 1.5–4.5)
GLUCOSE SERPL-MCNC: 101 MG/DL (ref 65–99)
HBA1C MFR BLD: 5.7 % (ref 4.8–5.6)
HCT VFR BLD AUTO: 33.8 % (ref 34–46.6)
HDLC SERPL-MCNC: 53 MG/DL
HGB BLD-MCNC: 11.2 G/DL (ref 11.1–15.9)
IMM GRANULOCYTES # BLD AUTO: 0 X10E3/UL (ref 0–0.1)
IMM GRANULOCYTES NFR BLD AUTO: 0 %
IRON SERPL-MCNC: 73 UG/DL (ref 27–139)
LDLC SERPL CALC-MCNC: 42 MG/DL (ref 0–99)
LYMPHOCYTES # BLD AUTO: 1.4 X10E3/UL (ref 0.7–3.1)
LYMPHOCYTES NFR BLD AUTO: 33 %
MCH RBC QN AUTO: 34.6 PG (ref 26.6–33)
MCHC RBC AUTO-ENTMCNC: 33.1 G/DL (ref 31.5–35.7)
MCV RBC AUTO: 104 FL (ref 79–97)
MONOCYTES # BLD AUTO: 0.5 X10E3/UL (ref 0.1–0.9)
MONOCYTES NFR BLD AUTO: 12 %
NEUTROPHILS # BLD AUTO: 2.2 X10E3/UL (ref 1.4–7)
NEUTROPHILS NFR BLD AUTO: 51 %
PLATELET # BLD AUTO: 205 X10E3/UL (ref 150–450)
POTASSIUM SERPL-SCNC: 3.5 MMOL/L (ref 3.5–5.2)
PROT SERPL-MCNC: 6.3 G/DL (ref 6–8.5)
RBC # BLD AUTO: 3.24 X10E6/UL (ref 3.77–5.28)
SODIUM SERPL-SCNC: 147 MMOL/L (ref 134–144)
TRIGL SERPL-MCNC: 104 MG/DL (ref 0–149)
TSH SERPL DL<=0.005 MIU/L-ACNC: 6.61 UIU/ML (ref 0.45–4.5)
VIT B12 SERPL-MCNC: 810 PG/ML (ref 232–1245)
VLDLC SERPL CALC-MCNC: 19 MG/DL (ref 5–40)
WBC # BLD AUTO: 4.4 X10E3/UL (ref 3.4–10.8)

## 2022-05-31 ENCOUNTER — OFFICE VISIT (OUTPATIENT)
Dept: FAMILY MEDICINE CLINIC | Facility: CLINIC | Age: 87
End: 2022-05-31

## 2022-05-31 VITALS
BODY MASS INDEX: 18.25 KG/M2 | OXYGEN SATURATION: 100 % | DIASTOLIC BLOOD PRESSURE: 66 MMHG | HEART RATE: 53 BPM | HEIGHT: 63 IN | SYSTOLIC BLOOD PRESSURE: 128 MMHG | WEIGHT: 103 LBS

## 2022-05-31 DIAGNOSIS — M15.9 PRIMARY OSTEOARTHRITIS INVOLVING MULTIPLE JOINTS: ICD-10-CM

## 2022-05-31 DIAGNOSIS — F41.9 ANXIETY: ICD-10-CM

## 2022-05-31 DIAGNOSIS — E78.2 MIXED HYPERLIPIDEMIA: ICD-10-CM

## 2022-05-31 DIAGNOSIS — I10 ESSENTIAL HYPERTENSION: ICD-10-CM

## 2022-05-31 DIAGNOSIS — I25.2 HX OF NON-ST ELEVATION MYOCARDIAL INFARCTION (NSTEMI): ICD-10-CM

## 2022-05-31 DIAGNOSIS — E11.9 TYPE 2 DIABETES MELLITUS WITHOUT COMPLICATION, WITHOUT LONG-TERM CURRENT USE OF INSULIN: ICD-10-CM

## 2022-05-31 DIAGNOSIS — Z79.899 ENCOUNTER FOR LONG-TERM (CURRENT) USE OF OTHER MEDICATIONS: ICD-10-CM

## 2022-05-31 DIAGNOSIS — Z85.09 H/O MALIGNANT GASTROINTESTINAL STROMAL TUMOR (GIST): ICD-10-CM

## 2022-05-31 DIAGNOSIS — M47.816 LUMBAR SPONDYLOSIS: ICD-10-CM

## 2022-05-31 DIAGNOSIS — E78.5 HYPERLIPIDEMIA LDL GOAL <100: ICD-10-CM

## 2022-05-31 DIAGNOSIS — R53.83 FATIGUE, UNSPECIFIED TYPE: ICD-10-CM

## 2022-05-31 DIAGNOSIS — I65.23 ATHEROSCLEROSIS OF BOTH CAROTID ARTERIES: ICD-10-CM

## 2022-05-31 DIAGNOSIS — Z98.890 HISTORY OF CAROTID ENDARTERECTOMY: ICD-10-CM

## 2022-05-31 DIAGNOSIS — K21.9 GASTROESOPHAGEAL REFLUX DISEASE WITHOUT ESOPHAGITIS: ICD-10-CM

## 2022-05-31 DIAGNOSIS — E03.9 ACQUIRED HYPOTHYROIDISM: ICD-10-CM

## 2022-05-31 DIAGNOSIS — Z00.01 ENCOUNTER FOR GENERAL ADULT MEDICAL EXAMINATION WITH ABNORMAL FINDINGS: Primary | ICD-10-CM

## 2022-05-31 DIAGNOSIS — I25.10 CORONARY ARTERY DISEASE INVOLVING NATIVE CORONARY ARTERY OF NATIVE HEART WITHOUT ANGINA PECTORIS: ICD-10-CM

## 2022-05-31 DIAGNOSIS — I10 ESSENTIAL HYPERTENSION, BENIGN: ICD-10-CM

## 2022-05-31 DIAGNOSIS — K13.70 ORAL LESION: ICD-10-CM

## 2022-05-31 PROBLEM — M15.0 PRIMARY OSTEOARTHRITIS INVOLVING MULTIPLE JOINTS: Status: ACTIVE | Noted: 2022-05-31

## 2022-05-31 PROCEDURE — 99397 PER PM REEVAL EST PAT 65+ YR: CPT | Performed by: FAMILY MEDICINE

## 2022-05-31 RX ORDER — CLOPIDOGREL BISULFATE 75 MG/1
75 TABLET ORAL DAILY
Qty: 90 TABLET | Refills: 3 | Status: SHIPPED | OUTPATIENT
Start: 2022-05-31 | End: 2022-10-14

## 2022-05-31 RX ORDER — HYDROCHLOROTHIAZIDE 25 MG/1
25 TABLET ORAL DAILY
Qty: 90 TABLET | Refills: 1 | Status: SHIPPED | OUTPATIENT
Start: 2022-05-31 | End: 2022-12-12 | Stop reason: SDUPTHER

## 2022-05-31 RX ORDER — HYDROCHLOROTHIAZIDE 25 MG/1
25 TABLET ORAL DAILY
COMMUNITY
End: 2022-05-31 | Stop reason: SDUPTHER

## 2022-05-31 RX ORDER — CLOPIDOGREL BISULFATE 75 MG/1
75 TABLET ORAL DAILY
COMMUNITY
End: 2022-05-31 | Stop reason: SDUPTHER

## 2022-05-31 RX ORDER — SIMVASTATIN 20 MG
20 TABLET ORAL NIGHTLY
Qty: 90 TABLET | Refills: 3 | Status: SHIPPED | OUTPATIENT
Start: 2022-05-31

## 2022-05-31 RX ORDER — TRAMADOL HYDROCHLORIDE 50 MG/1
TABLET ORAL
COMMUNITY
Start: 2021-06-14 | End: 2024-04-01

## 2022-05-31 NOTE — PROGRESS NOTES
"Chief Complaint  Med Refill (Yearly check up ) and Annual Exam    Subjective      Cristiane Harris presents to [facility]  History of Present Illness  Patient came in for labs recently but has not received her lab letter yet.  Therefore we reviewed her results.  Her TSH was slightly elevated at 6.6, but she feels perfectly fine and has no symptoms of hypothyroidism.  Pros and cons of starting levothyroxine now versus rechecking test and 1 to 2 months were discussed, and she prefers to retest the levels and we will go from there.  Her hypertension got out of whack a few months ago shows he was admitted to the hospital in [place] and the cardiologist adjusted her medications.  Following that, her blood pressure began to drop too low, so she stopped the losartan completely.  She does continue to take hydrochlorothiazide 25 mg a day as prescribed by the cardiologist.  Since that time her blood pressure has been very well controlled ranging from 109- 128/51-64.  She says she feels well overall, but has a red sore spot on her tongue that has been there for about 2 months.  She has an appointment with dermatologist in about 3 to 4 weeks and will show it to them, and I explained to her that is very important that she do so to make sure this is not a malignant or premalignant lesion, though it is very small.  Her chronic issues are stable and well-controlled.  She does feel better with the Zoloft and has less anxiety and no depression    Objective   Vital Signs:   Vitals:    05/31/22 0932 05/31/22 1248   BP: 142/70 128/66   BP Location: Left arm Left arm   Patient Position: Sitting Sitting   Cuff Size: Adult    Pulse: 53    SpO2: 100%    Weight: 46.7 kg (103 lb)    Height: 160 cm (63\")       /66 (BP Location: Left arm, Patient Position: Sitting)   Pulse 53   Ht 160 cm (63\")   Wt 46.7 kg (103 lb)   SpO2 100%   BMI 18.25 kg/m²     Body mass index is 18.25 kg/m².    Review of Systems "   Constitutional: Negative for chills, fever, unexpected weight gain and unexpected weight loss.   HENT: Negative for ear discharge, ear pain, facial swelling, hearing loss, mouth sores, nosebleeds, postnasal drip, rhinorrhea, sinus pressure, sore throat, swollen glands, tinnitus, trouble swallowing and voice change.    Eyes: Negative for blurred vision, double vision, photophobia, pain, redness and visual disturbance.   Respiratory: Negative for cough, chest tightness, shortness of breath and wheezing.    Cardiovascular: Negative for chest pain, palpitations and leg swelling.        PND, orthopnea   Gastrointestinal: Negative for abdominal distention, abdominal pain, anal bleeding, blood in stool, constipation, diarrhea, nausea, vomiting, GERD and indigestion.        Dysphagia, odynophagia   Endocrine: Negative for polydipsia, polyphagia and polyuria.   Genitourinary: Negative for dysuria, frequency, hematuria, urgency and urinary incontinence.   Musculoskeletal: Negative for arthralgias (unusual/atypica), back pain, gait problem, joint swelling, myalgias and neck pain.   Skin: Negative for color change, rash, skin lesions (worrisome/suspicious), wound and bruise.   Allergic/Immunologic: Negative for food allergies.   Neurological: Negative for dizziness, tremors, seizures, syncope, speech difficulty, weakness, light-headedness, numbness, headache, memory problem and confusion.   Hematological: Negative for adenopathy. Does not bruise/bleed easily.   Psychiatric/Behavioral: Negative for suicidal ideas and depressed mood. The patient is not nervous/anxious.        Past History:  Medical History: has a past medical history of Anemia, Arthritis, GERD (gastroesophageal reflux disease), GIST (gastrointestinal stromal tumor), non-malignant, Hyperlipemia, Hypertension, Skin cancer, and Stenosis of carotid artery.   Surgical History: has a past surgical history that includes Thromboendarterectomy (Right, 11/20/2012); Other  surgical history; Carotid Endarterectomy (Left); Breast surgery (2011); Tumor removal; Whipple Procedure; Cataract extraction w/ intraocular lens implant; Joint replacement; Total knee arthroplasty (Right); and Esophagogastroduodenoscopy (N/A, 7/3/2018).   Family History: family history includes Aneurysm in her father; Cancer in her mother and sister; Coronary artery disease in her father; Diabetes in her father.   Social History: reports that she has never smoked. She has never used smokeless tobacco. She reports that she does not drink alcohol and does not use drugs.      Current Outpatient Medications:   •  aspirin 81 MG tablet, Take 81 mg by mouth Daily., Disp: , Rfl:   •  clopidogrel (PLAVIX) 75 MG tablet, Take 1 tablet by mouth Daily., Disp: 90 tablet, Rfl: 3  •  dorzolamide (TRUSOPT) 2 % ophthalmic solution, Administer 1 drop to both eyes 2 (Two) Times a Day., Disp: , Rfl:   •  hydroCHLOROthiazide (HYDRODIURIL) 25 MG tablet, Take 1 tablet by mouth Daily., Disp: 90 tablet, Rfl: 1  •  imatinib (GLEEVEC) 400 MG chemo tablet, Take 100 mg by mouth Daily., Disp: , Rfl:   •  Multiple Vitamins-Minerals (MULTIVITAMIN PO), Take 1 tablet by mouth Daily., Disp: , Rfl:   •  pentosan polysulfate (ELMIRON) 100 MG capsule, Take  by mouth 2 (two) times a day., Disp: , Rfl:   •  simvastatin (ZOCOR) 20 MG tablet, Take 1 tablet by mouth Every Night., Disp: 90 tablet, Rfl: 3  •  timolol (TIMOPTIC) 0.5 % ophthalmic solution, Administer 1 drop to both eyes 2 (Two) Times a Day., Disp: , Rfl:   •  traMADol (ULTRAM) 50 MG tablet, take 1-2 pills po [id number] prn pain as needed, Disp: , Rfl:   •  TRAVATAN Z 0.004 % solution ophthalmic solution, Administer 1 drop to both eyes Daily., Disp: , Rfl:   •  esomeprazole (nexIUM) 20 MG capsule, Take 1 capsule by mouth Daily As Needed (acid reflux)., Disp: 90 capsule, Rfl: 1    Allergies: Acetaminophen    Physical Exam  Constitutional:       General: She is not in acute distress.     Appearance:  Normal appearance. She is not toxic-appearing.   HENT:      Head: Normocephalic and atraumatic.      Right Ear: Tympanic membrane, ear canal and external ear normal.      Left Ear: Tympanic membrane, ear canal and external ear normal.      Nose: Nose normal. No rhinorrhea.      Mouth/Throat:      Mouth: Mucous membranes are moist.      Pharynx: Oropharynx is clear. No oropharyngeal exudate or posterior oropharyngeal erythema.      Comments: 2 to 3 mm red spot tip of right side of tongue slightly raised  Eyes:      General: No scleral icterus.     Extraocular Movements: Extraocular movements intact.      Conjunctiva/sclera: Conjunctivae normal.      Pupils: Pupils are equal, round, and reactive to light.   Neck:      Vascular: No carotid bruit.   Cardiovascular:      Rate and Rhythm: Normal rate and regular rhythm.      Pulses:           Dorsalis pedis pulses are 1+ on the right side and 1+ on the left side.        Posterior tibial pulses are 1+ on the right side and 1+ on the left side.      Heart sounds: Normal heart sounds. No murmur heard.    No gallop.   Pulmonary:      Effort: Pulmonary effort is normal.      Breath sounds: Normal breath sounds. No wheezing, rhonchi or rales.   Chest:      Chest wall: No tenderness.   Abdominal:      General: Bowel sounds are normal. There is no distension.      Palpations: Abdomen is soft. There is no mass.      Tenderness: There is no abdominal tenderness. There is no guarding or rebound.   Musculoskeletal:         General: No swelling, tenderness or deformity. Normal range of motion.      Cervical back: Neck supple. No rigidity or tenderness.      Right lower leg: No edema.      Left lower leg: No edema.   Feet:      Right foot:      Protective Sensation: 5 sites tested. 5 sites sensed.      Skin integrity: Skin integrity normal.      Left foot:      Protective Sensation: 5 sites tested. 5 sites sensed.      Skin integrity: Skin integrity normal.      Comments:       Lymphadenopathy:      Cervical: No cervical adenopathy.   Skin:     General: Skin is warm and dry.      Capillary Refill: Capillary refill takes less than 2 seconds.      Coloration: Skin is not jaundiced or pale.      Findings: No bruising, erythema or rash.   Neurological:      General: No focal deficit present.      Mental Status: She is alert and oriented to person, place, and time.      Cranial Nerves: No cranial nerve deficit.      Sensory: No sensory deficit.      Motor: No weakness.      Coordination: Coordination normal.      Gait: Gait normal.   Psychiatric:         Mood and Affect: Mood normal.         Behavior: Behavior normal.         Thought Content: Thought content normal.         Judgment: Judgment normal.          Result Review :                  Assessment and Plan   Diagnoses and all orders for this visit:    1. Encounter for general adult medical examination with abnormal findings (Primary)  Phonic problems were all reviewed, as well as recent events.  She will ask dermatology about the lesion on her tongue, which is listed under diagnoses as oral lesion below as I could not find a diagnosis code for lesion on the tongue.  Her labs were reviewed and she is in a come back in about 6 weeks to have TSH and free T4 checked.  We will refill her medications.  If all goes well I will see her back in about 6 months or sooner if needed  2. Type 2 diabetes mellitus without complication, without long-term current use of insulin (HCC)    3. Essential hypertension, benign    4. Mixed hyperlipidemia    5. Encounter for long-term (current) use of other medications    6. Fatigue, unspecified type    7. Coronary artery disease involving native coronary artery of native heart without angina pectoris    8. Essential hypertension    9. Atherosclerosis of both carotid arteries    10. Anxiety    11. History of carotid endarterectomy    12. Hx of non-ST elevation myocardial infarction (NSTEMI)    13. H/O malignant  gastrointestinal stromal tumor (GIST)    14. Lumbar spondylosis    15. Primary osteoarthritis involving multiple joints    16. Gastroesophageal reflux disease without esophagitis    17. Acquired hypothyroidism  -     TSH+Free T4; Future    18. Hyperlipidemia LDL goal <100  -     simvastatin (ZOCOR) 20 MG tablet; Take 1 tablet by mouth Every Night.  Dispense: 90 tablet; Refill: 3    19. Oral lesion    Other orders  -     clopidogrel (PLAVIX) 75 MG tablet; Take 1 tablet by mouth Daily.  Dispense: 90 tablet; Refill: 3  -     hydroCHLOROthiazide (HYDRODIURIL) 25 MG tablet; Take 1 tablet by mouth Daily.  Dispense: 90 tablet; Refill: 1  -     esomeprazole (nexIUM) 20 MG capsule; Take 1 capsule by mouth Daily As Needed (acid reflux).  Dispense: 90 capsule; Refill: 1                 Follow Up   No follow-ups on file.  Patient was given instructions and counseling regarding her condition or for health maintenance advice. Please see specific information pulled into the AVS if appropriate.     Thong Landin MD

## 2022-06-14 ENCOUNTER — OFFICE VISIT (OUTPATIENT)
Dept: FAMILY MEDICINE CLINIC | Facility: CLINIC | Age: 87
End: 2022-06-14

## 2022-06-14 VITALS
OXYGEN SATURATION: 100 % | DIASTOLIC BLOOD PRESSURE: 70 MMHG | HEART RATE: 51 BPM | HEIGHT: 63 IN | WEIGHT: 101 LBS | TEMPERATURE: 98.2 F | SYSTOLIC BLOOD PRESSURE: 126 MMHG | BODY MASS INDEX: 17.89 KG/M2

## 2022-06-14 DIAGNOSIS — J40 BRONCHITIS: Primary | ICD-10-CM

## 2022-06-14 PROCEDURE — 99214 OFFICE O/P EST MOD 30 MIN: CPT | Performed by: NURSE PRACTITIONER

## 2022-06-14 RX ORDER — AZITHROMYCIN 250 MG/1
TABLET, FILM COATED ORAL
Qty: 6 TABLET | Refills: 0 | Status: SHIPPED | OUTPATIENT
Start: 2022-06-14 | End: 2022-06-19

## 2022-06-14 RX ORDER — CHLORPHENIRAMINE MALEATE 4 MG/1
4 TABLET ORAL EVERY 6 HOURS PRN
Qty: 30 TABLET | Refills: 0 | Status: SHIPPED | OUTPATIENT
Start: 2022-06-14 | End: 2022-10-14

## 2022-06-14 RX ORDER — GUAIFENESIN 600 MG/1
1200 TABLET, EXTENDED RELEASE ORAL 2 TIMES DAILY
Qty: 20 TABLET | Refills: 0 | Status: SHIPPED | OUTPATIENT
Start: 2022-06-14 | End: 2022-09-06

## 2022-06-14 NOTE — PROGRESS NOTES
"Chief Complaint  Sinusitis    Subjective          Cristiane Harris presents to [facility]  Pt has had cough and congestion x 10 days. She denies fever, chills, or myalgias. She denies SOA or known sick contacts.      Objective   Vital Signs:   /70   Pulse 51   Temp 98.2 °F (36.8 °C)   Ht 160 cm (63\")   Wt 45.8 kg (101 lb)   SpO2 100%   BMI 17.89 kg/m²     Body mass index is 17.89 kg/m².    Review of Systems   Constitutional: Negative for fatigue and fever.   HENT: Positive for congestion.    Respiratory: Positive for cough. Negative for shortness of breath.    Cardiovascular: Negative for chest pain, palpitations and leg swelling.   Neurological: Negative for syncope.   Psychiatric/Behavioral: The patient is not nervous/anxious.           Current Outpatient Medications:   •  aspirin 81 MG tablet, Take 81 mg by mouth Daily., Disp: , Rfl:   •  clopidogrel (PLAVIX) 75 MG tablet, Take 1 tablet by mouth Daily., Disp: 90 tablet, Rfl: 3  •  dorzolamide (TRUSOPT) 2 % ophthalmic solution, Administer 1 drop to both eyes 2 (Two) Times a Day., Disp: , Rfl:   •  esomeprazole (nexIUM) 20 MG capsule, Take 1 capsule by mouth Daily As Needed (acid reflux)., Disp: 90 capsule, Rfl: 1  •  hydroCHLOROthiazide (HYDRODIURIL) 25 MG tablet, Take 1 tablet by mouth Daily., Disp: 90 tablet, Rfl: 1  •  imatinib (GLEEVEC) 400 MG chemo tablet, Take 100 mg by mouth Daily., Disp: , Rfl:   •  Multiple Vitamins-Minerals (MULTIVITAMIN PO), Take 1 tablet by mouth Daily., Disp: , Rfl:   •  pentosan polysulfate (ELMIRON) 100 MG capsule, Take  by mouth 2 (two) times a day., Disp: , Rfl:   •  simvastatin (ZOCOR) 20 MG tablet, Take 1 tablet by mouth Every Night., Disp: 90 tablet, Rfl: 3  •  traMADol (ULTRAM) 50 MG tablet, take 1-2 pills po [id number] prn pain as needed, Disp: , Rfl:   •  TRAVATAN Z 0.004 % solution ophthalmic solution, Administer 1 drop to both eyes Daily., Disp: , Rfl:   •  azithromycin (Zithromax " Z-Scott) 250 MG tablet, Take 2 tablets by mouth Daily for 1 day, THEN 1 tablet Daily for 4 days., Disp: 6 tablet, Rfl: 0  •  chlorpheniramine (CHLOR-TRIMETON) 4 MG tablet, Take 1 tablet by mouth Every 6 (Six) Hours As Needed for Rhinitis., Disp: 30 tablet, Rfl: 0  •  guaiFENesin (Mucinex) 600 MG 12 hr tablet, Take 2 tablets by mouth 2 (Two) Times a Day., Disp: 20 tablet, Rfl: 0  •  timolol (TIMOPTIC) 0.5 % ophthalmic solution, Administer 1 drop to both eyes 2 (Two) Times a Day., Disp: , Rfl:       Allergies: Acetaminophen    Physical Exam  Constitutional:       Appearance: Normal appearance.   HENT:      Head: Normocephalic.      Right Ear: Tympanic membrane normal.      Left Ear: Tympanic membrane normal.   Eyes:      Conjunctiva/sclera: Conjunctivae normal.      Pupils: Pupils are equal, round, and reactive to light.   Cardiovascular:      Rate and Rhythm: Normal rate and regular rhythm.      Heart sounds: Normal heart sounds.   Pulmonary:      Effort: Pulmonary effort is normal.      Breath sounds: Normal breath sounds.   Abdominal:      Tenderness: There is no abdominal tenderness.   Musculoskeletal:         General: Normal range of motion.   Skin:     General: Skin is warm and dry.      Capillary Refill: Capillary refill takes less than 2 seconds.   Neurological:      General: No focal deficit present.      Mental Status: She is alert and oriented to person, place, and time.   Psychiatric:         Mood and Affect: Mood normal.         Behavior: Behavior normal.         Thought Content: Thought content normal.         Judgment: Judgment normal.          Result Review :                   Assessment and Plan    Diagnoses and all orders for this visit:    1. Bronchitis (Primary)  Comments:  Increase fluids. Mucinex as needed for cough. Finish antibiotics. Return for worsened sx and if not improving in 1 week.   Orders:  -     chlorpheniramine (CHLOR-TRIMETON) 4 MG tablet; Take 1 tablet by mouth Every 6 (Six) Hours As  Needed for Rhinitis.  Dispense: 30 tablet; Refill: 0  -     guaiFENesin (Mucinex) 600 MG 12 hr tablet; Take 2 tablets by mouth 2 (Two) Times a Day.  Dispense: 20 tablet; Refill: 0  -     azithromycin (Zithromax Z-Scott) 250 MG tablet; Take 2 tablets by mouth Daily for 1 day, THEN 1 tablet Daily for 4 days.  Dispense: 6 tablet; Refill: 0                 Follow Up   Return in about 1 week (around 6/21/2022) for if not improving or sooner if symptoms worsen.  Patient was given instructions and counseling regarding her condition or for health maintenance advice. Please see specific information pulled into the AVS if appropriate.     Hallie Sorto, EARNEST

## 2022-07-12 ENCOUNTER — LAB (OUTPATIENT)
Dept: FAMILY MEDICINE CLINIC | Facility: CLINIC | Age: 87
End: 2022-07-12

## 2022-07-12 DIAGNOSIS — E03.9 ACQUIRED HYPOTHYROIDISM: ICD-10-CM

## 2022-07-12 PROCEDURE — 36415 COLL VENOUS BLD VENIPUNCTURE: CPT | Performed by: FAMILY MEDICINE

## 2022-07-13 LAB
T4 FREE SERPL-MCNC: 1.23 NG/DL (ref 0.82–1.77)
TSH SERPL DL<=0.005 MIU/L-ACNC: 4.18 UIU/ML (ref 0.45–4.5)

## 2022-08-08 ENCOUNTER — TELEPHONE (OUTPATIENT)
Dept: FAMILY MEDICINE CLINIC | Facility: CLINIC | Age: 87
End: 2022-08-08

## 2022-08-08 NOTE — TELEPHONE ENCOUNTER
Caller: Cristiane Harris    Relationship to patient: Self    Best call back number: [phone]    New or established patient?  [] New  [x] Established    Date of discharge: 08/08    Facility discharged from: [facility]    Diagnosis/Symptoms: FELL AND HIT HEAD AND HAD 8 STAPLES PUT IN. HER STAPLES NEED TO BE REMOVED NEXT Monday. SHE PREFERS AROUND 9AM OR IN THE MORNING.    PLEASE CALL TO SCHEDULE

## 2022-08-15 ENCOUNTER — OFFICE VISIT (OUTPATIENT)
Dept: FAMILY MEDICINE CLINIC | Facility: CLINIC | Age: 87
End: 2022-08-15

## 2022-08-15 VITALS
BODY MASS INDEX: 18.39 KG/M2 | HEIGHT: 63 IN | HEART RATE: 54 BPM | TEMPERATURE: 98 F | SYSTOLIC BLOOD PRESSURE: 140 MMHG | DIASTOLIC BLOOD PRESSURE: 64 MMHG | WEIGHT: 103.8 LBS | OXYGEN SATURATION: 100 % | RESPIRATION RATE: 18 BRPM

## 2022-08-15 DIAGNOSIS — S01.01XD LACERATION OF SCALP, SUBSEQUENT ENCOUNTER: Primary | ICD-10-CM

## 2022-08-15 DIAGNOSIS — S01.81XD LACERATION OF FOREHEAD, SUBSEQUENT ENCOUNTER: ICD-10-CM

## 2022-08-15 PROCEDURE — 99213 OFFICE O/P EST LOW 20 MIN: CPT | Performed by: FAMILY MEDICINE

## 2022-08-15 NOTE — PROGRESS NOTES
"Chief Complaint  Follow-up and Suture / Staple Removal    Subjective      Cristiane Harris presents to [facility]  History of Present Illness  Patient was sitting in her kitchen about 8 days ago on her right foot went to sleep so when she got up to walk to the cabinets her leg gave way and she fell, striking her forehead and the top of her head.  She was not knocked unconscious, there is no chest pain palpitations nausea dizziness blurred vision etc. she says she felt okay so she got up and was going to attend the wound on her forehead but then noticed she was bleeding from the apex of her scalp as well.  A neighbor was contacted and saw the laceration on the top of her scalp and took her to the emergency department where staples were applied.  The laceration on the forehead on the upper right side was small enough that adhesive was used, and it is healing well.  She is felt fine since that time    Objective   Vital Signs:   Vitals:    08/15/22 0823   BP: 140/64   Pulse: 54   Resp: 18   Temp: 98 °F (36.7 °C)   TempSrc: Tympanic   SpO2: 100%   Weight: 47.1 kg (103 lb 12.8 oz)   Height: 160 cm (63\")      /64   Pulse 54   Temp 98 °F (36.7 °C) (Tympanic)   Resp 18   Ht 160 cm (63\")   Wt 47.1 kg (103 lb 12.8 oz)   SpO2 100%   BMI 18.39 kg/m²     Body mass index is 18.39 kg/m².    Review of Systems    Past History:  Medical History: has a past medical history of Anemia, Arthritis, GERD (gastroesophageal reflux disease), GIST (gastrointestinal stromal tumor), non-malignant, Hyperlipemia, Hypertension, Skin cancer, and Stenosis of carotid artery.   Surgical History: has a past surgical history that includes Thromboendarterectomy (Right, 11/20/2012); Other surgical history; Carotid Endarterectomy (Left); Breast surgery (2011); Tumor removal; Whipple Procedure; Cataract extraction w/ intraocular lens implant; Joint replacement; Total knee arthroplasty (Right); and " Esophagogastroduodenoscopy (N/A, 7/3/2018).   Family History: family history includes Aneurysm in her father; Cancer in her mother and sister; Coronary artery disease in her father; Diabetes in her father.   Social History: reports that she has never smoked. She has never used smokeless tobacco. She reports that she does not drink alcohol and does not use drugs.      Current Outpatient Medications:   •  aspirin 81 MG tablet, Take 81 mg by mouth Daily., Disp: , Rfl:   •  chlorpheniramine (CHLOR-TRIMETON) 4 MG tablet, Take 1 tablet by mouth Every 6 (Six) Hours As Needed for Rhinitis., Disp: 30 tablet, Rfl: 0  •  clopidogrel (PLAVIX) 75 MG tablet, Take 1 tablet by mouth Daily., Disp: 90 tablet, Rfl: 3  •  dorzolamide (TRUSOPT) 2 % ophthalmic solution, Administer 1 drop to both eyes 2 (Two) Times a Day., Disp: , Rfl:   •  esomeprazole (nexIUM) 20 MG capsule, Take 1 capsule by mouth Daily As Needed (acid reflux)., Disp: 90 capsule, Rfl: 1  •  hydroCHLOROthiazide (HYDRODIURIL) 25 MG tablet, Take 1 tablet by mouth Daily., Disp: 90 tablet, Rfl: 1  •  imatinib (GLEEVEC) 400 MG chemo tablet, Take 100 mg by mouth Daily., Disp: , Rfl:   •  Multiple Vitamins-Minerals (MULTIVITAMIN PO), Take 1 tablet by mouth Daily., Disp: , Rfl:   •  pentosan polysulfate (ELMIRON) 100 MG capsule, Take  by mouth 2 (two) times a day., Disp: , Rfl:   •  simvastatin (ZOCOR) 20 MG tablet, Take 1 tablet by mouth Every Night., Disp: 90 tablet, Rfl: 3  •  traMADol (ULTRAM) 50 MG tablet, take 1-2 pills po [id number] prn pain as needed, Disp: , Rfl:   •  TRAVATAN Z 0.004 % solution ophthalmic solution, Administer 1 drop to both eyes Daily., Disp: , Rfl:   •  guaiFENesin (Mucinex) 600 MG 12 hr tablet, Take 2 tablets by mouth 2 (Two) Times a Day., Disp: 20 tablet, Rfl: 0  •  timolol (TIMOPTIC) 0.5 % ophthalmic solution, Administer 1 drop to both eyes 2 (Two) Times a Day., Disp: , Rfl:     Allergies: Acetaminophen    Physical Exam  Constitutional:        General: She is not in acute distress.     Appearance: Normal appearance. She is not toxic-appearing.   HENT:      Head: Normocephalic.      Right Ear: External ear normal.      Left Ear: External ear normal.      Nose: Nose normal.      Mouth/Throat:      Mouth: Mucous membranes are moist.      Pharynx: Oropharynx is clear.   Eyes:      Conjunctiva/sclera: Conjunctivae normal.      Pupils: Pupils are equal, round, and reactive to light.   Cardiovascular:      Rate and Rhythm: Normal rate and regular rhythm.      Pulses:           Popliteal pulses are 1+ on the right side and 1+ on the left side.        Posterior tibial pulses are 1+ on the right side and 1+ on the left side.      Heart sounds: Normal heart sounds.   Pulmonary:      Effort: Pulmonary effort is normal.      Breath sounds: Normal breath sounds.   Musculoskeletal:      Cervical back: Normal range of motion and neck supple. No tenderness.      Right lower leg: No edema.      Left lower leg: No edema.   Lymphadenopathy:      Cervical: No cervical adenopathy.   Skin:     General: Skin is warm and dry.      Capillary Refill: Capillary refill takes less than 2 seconds.      Coloration: Skin is not pale.      Findings: Lesion (Laceration on right upper forehead healing nicely, along with laceration on the apex of scalp that is healing well with staples in place) present. No erythema or rash.   Neurological:      General: No focal deficit present.      Mental Status: She is alert and oriented to person, place, and time. Mental status is at baseline.      Cranial Nerves: No cranial nerve deficit.      Motor: No weakness.      Gait: Gait normal.   Psychiatric:         Mood and Affect: Mood normal.         Thought Content: Thought content normal.          Result Review :                 Assessment and Plan   Diagnoses and all orders for this visit:    1. Laceration of scalp, subsequent encounter (Primary)  Staples were removed.  Wound care instructions were  given.  She will follow-up as needed or when her next appointment is due  2. Laceration of forehead, subsequent encounter                 Follow Up   No follow-ups on file.  Patient was given instructions and counseling regarding her condition or for health maintenance advice. Please see specific information pulled into the AVS if appropriate.     Thong Landin MD

## 2022-09-06 ENCOUNTER — OFFICE VISIT (OUTPATIENT)
Dept: FAMILY MEDICINE CLINIC | Facility: CLINIC | Age: 87
End: 2022-09-06

## 2022-09-06 VITALS
HEART RATE: 61 BPM | DIASTOLIC BLOOD PRESSURE: 90 MMHG | SYSTOLIC BLOOD PRESSURE: 130 MMHG | OXYGEN SATURATION: 99 % | WEIGHT: 104 LBS | BODY MASS INDEX: 18.43 KG/M2 | HEIGHT: 63 IN

## 2022-09-06 DIAGNOSIS — M25.562 ACUTE PAIN OF LEFT KNEE: Primary | ICD-10-CM

## 2022-09-06 PROCEDURE — 99213 OFFICE O/P EST LOW 20 MIN: CPT | Performed by: NURSE PRACTITIONER

## 2022-09-06 PROCEDURE — 96372 THER/PROPH/DIAG INJ SC/IM: CPT | Performed by: NURSE PRACTITIONER

## 2022-09-06 RX ORDER — TRIAMCINOLONE ACETONIDE 40 MG/ML
40 INJECTION, SUSPENSION INTRA-ARTICULAR; INTRAMUSCULAR ONCE
Status: COMPLETED | OUTPATIENT
Start: 2022-09-06 | End: 2022-09-06

## 2022-09-06 RX ADMIN — TRIAMCINOLONE ACETONIDE 40 MG: 40 INJECTION, SUSPENSION INTRA-ARTICULAR; INTRAMUSCULAR at 15:21

## 2022-09-06 NOTE — PROGRESS NOTES
"Chief Complaint  Leg Swelling (Left leg swelling, started yesterday)    Subjective          Cristiane Harris presents to [facility]  Pt awoke this morning with left knee pain and swelling. She reports h/o right knee replacement. She denies trauma to the area. She denies swelling or pain in her calf.       Objective   Vital Signs:   /90   Pulse 61   Ht 160 cm (63\")   Wt 47.2 kg (104 lb)   SpO2 99%   BMI 18.42 kg/m²     Body mass index is 18.42 kg/m².    Review of Systems   Constitutional: Negative for fatigue and fever.   Respiratory: Negative for shortness of breath.    Cardiovascular: Negative for chest pain, palpitations and leg swelling.   Musculoskeletal: Positive for arthralgias.   Neurological: Negative for syncope.   Psychiatric/Behavioral: The patient is not nervous/anxious.           Current Outpatient Medications:   •  aspirin 81 MG tablet, Take 81 mg by mouth Daily., Disp: , Rfl:   •  chlorpheniramine (CHLOR-TRIMETON) 4 MG tablet, Take 1 tablet by mouth Every 6 (Six) Hours As Needed for Rhinitis., Disp: 30 tablet, Rfl: 0  •  clopidogrel (PLAVIX) 75 MG tablet, Take 1 tablet by mouth Daily., Disp: 90 tablet, Rfl: 3  •  dorzolamide (TRUSOPT) 2 % ophthalmic solution, Administer 1 drop to both eyes 2 (Two) Times a Day., Disp: , Rfl:   •  esomeprazole (nexIUM) 20 MG capsule, Take 1 capsule by mouth Daily As Needed (acid reflux)., Disp: 90 capsule, Rfl: 1  •  hydroCHLOROthiazide (HYDRODIURIL) 25 MG tablet, Take 1 tablet by mouth Daily., Disp: 90 tablet, Rfl: 1  •  imatinib (GLEEVEC) 400 MG chemo tablet, Take 100 mg by mouth Daily., Disp: , Rfl:   •  Multiple Vitamins-Minerals (MULTIVITAMIN PO), Take 1 tablet by mouth Daily., Disp: , Rfl:   •  pentosan polysulfate (ELMIRON) 100 MG capsule, Take  by mouth 2 (two) times a day., Disp: , Rfl:   •  simvastatin (ZOCOR) 20 MG tablet, Take 1 tablet by mouth Every Night., Disp: 90 tablet, Rfl: 3  •  traMADol (ULTRAM) 50 MG tablet, " take 1-2 pills po [id number] prn pain as needed, Disp: , Rfl:   •  TRAVATAN Z 0.004 % solution ophthalmic solution, Administer 1 drop to both eyes Daily., Disp: , Rfl:     Current Facility-Administered Medications:   •  triamcinolone acetonide (KENALOG-40) injection 40 mg, 40 mg, Intramuscular, Once, Hallie Sorto S, APRN      Allergies: Acetaminophen    Physical Exam  Constitutional:       Appearance: Normal appearance.   HENT:      Head: Normocephalic.   Eyes:      Conjunctiva/sclera: Conjunctivae normal.      Pupils: Pupils are equal, round, and reactive to light.   Cardiovascular:      Rate and Rhythm: Normal rate and regular rhythm.      Heart sounds: Normal heart sounds.   Pulmonary:      Effort: Pulmonary effort is normal.      Breath sounds: Normal breath sounds.   Abdominal:      Tenderness: There is no abdominal tenderness.   Musculoskeletal:         General: Normal range of motion.      Comments: Tenderness/edema left knee   Skin:     General: Skin is warm and dry.      Capillary Refill: Capillary refill takes less than 2 seconds.   Neurological:      General: No focal deficit present.      Mental Status: She is alert and oriented to person, place, and time.   Psychiatric:         Mood and Affect: Mood normal.         Behavior: Behavior normal.         Thought Content: Thought content normal.         Judgment: Judgment normal.          Result Review :                   Assessment and Plan    Diagnoses and all orders for this visit:    1. Acute pain of left knee (Primary)  Comments:  Kenalog given. Elevate leg when possible and apply ice to painful areas. We will refer to ortho if not improving. Return for worsened sx.  Orders:  -     triamcinolone acetonide (KENALOG-40) injection 40 mg                Follow Up   No follow-ups on file.  Patient was given instructions and counseling regarding her condition or for health maintenance advice. Please see specific information pulled into the AVS if appropriate.      Hallie Sorto, APRN

## 2022-10-14 ENCOUNTER — OFFICE VISIT (OUTPATIENT)
Dept: FAMILY MEDICINE CLINIC | Facility: CLINIC | Age: 87
End: 2022-10-14

## 2022-10-14 VITALS
OXYGEN SATURATION: 100 % | DIASTOLIC BLOOD PRESSURE: 72 MMHG | SYSTOLIC BLOOD PRESSURE: 122 MMHG | BODY MASS INDEX: 17.79 KG/M2 | RESPIRATION RATE: 18 BRPM | WEIGHT: 100.4 LBS | HEART RATE: 52 BPM

## 2022-10-14 DIAGNOSIS — R53.83 FATIGUE, UNSPECIFIED TYPE: ICD-10-CM

## 2022-10-14 DIAGNOSIS — E78.2 MIXED HYPERLIPIDEMIA: ICD-10-CM

## 2022-10-14 DIAGNOSIS — E11.22 TYPE 2 DIABETES MELLITUS WITH STAGE 3B CHRONIC KIDNEY DISEASE, WITHOUT LONG-TERM CURRENT USE OF INSULIN: Primary | ICD-10-CM

## 2022-10-14 DIAGNOSIS — M15.9 PRIMARY OSTEOARTHRITIS INVOLVING MULTIPLE JOINTS: ICD-10-CM

## 2022-10-14 DIAGNOSIS — Z98.890 HISTORY OF CAROTID ENDARTERECTOMY: ICD-10-CM

## 2022-10-14 DIAGNOSIS — N18.32 TYPE 2 DIABETES MELLITUS WITH STAGE 3B CHRONIC KIDNEY DISEASE, WITHOUT LONG-TERM CURRENT USE OF INSULIN: Primary | ICD-10-CM

## 2022-10-14 DIAGNOSIS — E03.9 ACQUIRED HYPOTHYROIDISM: ICD-10-CM

## 2022-10-14 DIAGNOSIS — M47.816 LUMBAR SPONDYLOSIS: ICD-10-CM

## 2022-10-14 DIAGNOSIS — Z79.899 ENCOUNTER FOR LONG-TERM (CURRENT) USE OF OTHER MEDICATIONS: ICD-10-CM

## 2022-10-14 DIAGNOSIS — F41.1 GAD (GENERALIZED ANXIETY DISORDER): ICD-10-CM

## 2022-10-14 DIAGNOSIS — I10 ESSENTIAL HYPERTENSION, BENIGN: ICD-10-CM

## 2022-10-14 DIAGNOSIS — I25.10 CORONARY ARTERY DISEASE INVOLVING NATIVE CORONARY ARTERY OF NATIVE HEART WITHOUT ANGINA PECTORIS: ICD-10-CM

## 2022-10-14 DIAGNOSIS — D53.9 DEFICIENCY ANEMIA: ICD-10-CM

## 2022-10-14 DIAGNOSIS — I25.2 HX OF NON-ST ELEVATION MYOCARDIAL INFARCTION (NSTEMI): ICD-10-CM

## 2022-10-14 DIAGNOSIS — Z85.09 H/O MALIGNANT GASTROINTESTINAL STROMAL TUMOR (GIST): ICD-10-CM

## 2022-10-14 DIAGNOSIS — K21.9 GASTROESOPHAGEAL REFLUX DISEASE WITHOUT ESOPHAGITIS: ICD-10-CM

## 2022-10-14 PROCEDURE — 99214 OFFICE O/P EST MOD 30 MIN: CPT | Performed by: FAMILY MEDICINE

## 2022-10-14 PROCEDURE — 36415 COLL VENOUS BLD VENIPUNCTURE: CPT | Performed by: FAMILY MEDICINE

## 2022-10-14 RX ORDER — IMATINIB MESYLATE 100 MG/1
TABLET, FILM COATED ORAL
COMMUNITY
Start: 2022-10-12

## 2022-10-14 RX ORDER — LOSARTAN POTASSIUM 50 MG/1
50 TABLET ORAL DAILY
COMMUNITY
Start: 2022-09-27 | End: 2022-10-29 | Stop reason: SDUPTHER

## 2022-10-14 RX ORDER — NITROGLYCERIN 0.4 MG/1
TABLET SUBLINGUAL
Qty: 25 TABLET | Refills: 2 | Status: SHIPPED | OUTPATIENT
Start: 2022-10-14

## 2022-10-15 LAB
BUN SERPL-MCNC: 23 MG/DL (ref 10–36)
BUN/CREAT SERPL: 21 (ref 12–28)
CALCIUM SERPL-MCNC: 9.8 MG/DL (ref 8.7–10.3)
CHLORIDE SERPL-SCNC: 105 MMOL/L (ref 96–106)
CO2 SERPL-SCNC: 25 MMOL/L (ref 20–29)
CREAT SERPL-MCNC: 1.12 MG/DL (ref 0.57–1)
EGFRCR SERPLBLD CKD-EPI 2021: 46 ML/MIN/1.73
GLUCOSE SERPL-MCNC: 100 MG/DL (ref 70–99)
POTASSIUM SERPL-SCNC: 4.1 MMOL/L (ref 3.5–5.2)
SODIUM SERPL-SCNC: 144 MMOL/L (ref 134–144)

## 2022-10-15 NOTE — PROGRESS NOTES
Chief Complaint  Med Refill    Subjective      Cristiane Harris presents to [facility]  History of Present Illness  Patient is here to get some medicines refilled before she goes to [place] for the winter.  She states that she saw her cardiologist recently, and he recommended that some changes be made to her blood pressure medications.  She cannot give me a precise explanation as to why the changes were needed, but he changed her losartan to 50 mg daily, and asked that she have a BMP checked in 2 to 3 weeks.  Upon perusal of records, her GFR was down to 43 last May, although it did come up to 48 in August, which I explained to her is not unusual at all for someone her age.  Nonetheless, I do not have the note from her cardiologist, and explained that he may have been concerned about the decline in kidney function compared to previous test.  The patient cannot recall if her blood pressure was running too high or too low when she saw the cardiologist, she just knew that he wanted to make some changes for some reason.  Currently her blood pressure appears to be doing well, although her there is some fluctuation, as she shows me a diary.  We discussed the fact that blood pressure constantly changes, and that even when someone is well controlled it is unlikely that the blood pressure will always stay in the ideal range, so the general range would like to see her blood pressures stay was discussed, and she will continue to monitor for now.  It had been a little high at home recently but she admits that she is anxious about getting packed to go to [place], and was much better today.  Rather than making any changes in her medication, she states she would rather continue to monitor things until she gets settled after the trip, and then call me if the blood pressure continues to run too high very often, in which case we can certainly call in some amlodipine for her to start on.  She used to be  on diltiazem, but her heart rate is often in the low 50s and I would be reluctant to put her back on a negative chronotropic agent.    Patient says she has not needed any nitroglycerin in the last year, but her current bottle is  and she would like a new prescription.  She also takes Elmiron for interstitial cystitis, and we have discussed the risk of possible side effects that medication, and she is willing to accept the risks and insist that she does still need the medication as it really helps her symptoms.  Objective   Vital Signs:   Vitals:    10/14/22 1043   BP: 122/72   Pulse: 52   Resp: 18   SpO2: 100%   Weight: 45.5 kg (100 lb 6.4 oz)      /72   Pulse 52   Resp 18   Wt 45.5 kg (100 lb 6.4 oz)   SpO2 100%   BMI 17.79 kg/m²     Body mass index is 17.79 kg/m².    Review of Systems   Constitutional: Negative for chills, diaphoresis, fever and unexpected weight loss.   HENT: Negative for ear discharge, ear pain, mouth sores, nosebleeds, rhinorrhea, sinus pressure, sore throat, swollen glands, trouble swallowing and voice change.    Eyes: Negative for blurred vision, double vision, pain, redness and visual disturbance.   Respiratory: Negative for cough, shortness of breath and wheezing.    Cardiovascular: Negative for chest pain, palpitations and leg swelling.        PND, orthopnea   Gastrointestinal: Negative for abdominal distention, abdominal pain, anal bleeding, blood in stool, constipation, diarrhea, nausea, vomiting and GERD.        Dysphagia, odynophagia   Endocrine: Negative for polydipsia, polyphagia and polyuria.   Genitourinary: Negative for dysuria, frequency, hematuria, urgency and urinary incontinence.   Musculoskeletal: Negative for arthralgias (unusual/atypica), back pain, gait problem, joint swelling, myalgias and neck pain.   Skin: Negative for rash, skin lesions (worrisome/suspicious) and bruise.   Allergic/Immunologic: Negative for food allergies.   Neurological: Negative for  dizziness, tremors, seizures, syncope, weakness, light-headedness, numbness and headache.   Hematological: Negative for adenopathy. Does not bruise/bleed easily.   Psychiatric/Behavioral: Negative for suicidal ideas and depressed mood. The patient is nervous/anxious.        Past History:  Medical History: has a past medical history of Anemia, Arthritis, GERD (gastroesophageal reflux disease), GIST (gastrointestinal stromal tumor), non-malignant, Hyperlipemia, Hypertension, Skin cancer, and Stenosis of carotid artery.   Surgical History: has a past surgical history that includes Thromboendarterectomy (Right, 11/20/2012); Other surgical history; Carotid Endarterectomy (Left); Breast surgery (2011); Tumor removal; Whipple Procedure; Cataract extraction w/ intraocular lens implant; Joint replacement; Total knee arthroplasty (Right); and Esophagogastroduodenoscopy (N/A, 7/3/2018).   Family History: family history includes Aneurysm in her father; Cancer in her mother and sister; Coronary artery disease in her father; Diabetes in her father.   Social History: reports that she has never smoked. She has never used smokeless tobacco. She reports that she does not drink alcohol and does not use drugs.      Current Outpatient Medications:   •  aspirin 81 MG tablet, Take 81 mg by mouth Daily., Disp: , Rfl:   •  dorzolamide (TRUSOPT) 2 % ophthalmic solution, Administer 1 drop to both eyes 2 (Two) Times a Day., Disp: , Rfl:   •  esomeprazole (nexIUM) 20 MG capsule, Take 1 capsule by mouth Daily As Needed (acid reflux)., Disp: 90 capsule, Rfl: 1  •  hydroCHLOROthiazide (HYDRODIURIL) 25 MG tablet, Take 1 tablet by mouth Daily., Disp: 90 tablet, Rfl: 1  •  imatinib (GLEEVEC) 100 MG chemo tablet, , Disp: , Rfl:   •  losartan (COZAAR) 50 MG tablet, Take 1 tablet by mouth Daily., Disp: , Rfl:   •  Multiple Vitamins-Minerals (MULTIVITAMIN PO), Take 1 tablet by mouth Daily., Disp: , Rfl:   •  pentosan polysulfate (ELMIRON) 100 MG capsule,  Take 1 capsule by mouth 2 (Two) Times a Day., Disp: 180 capsule, Rfl: 3  •  simvastatin (ZOCOR) 20 MG tablet, Take 1 tablet by mouth Every Night., Disp: 90 tablet, Rfl: 3  •  traMADol (ULTRAM) 50 MG tablet, take 1-2 pills po [id number] prn pain as needed, Disp: , Rfl:   •  TRAVATAN Z 0.004 % solution ophthalmic solution, Administer 1 drop to both eyes Daily., Disp: , Rfl:   •  nitroglycerin (Nitrostat) 0.4 MG SL tablet, Place one SL prn chest pain, may repeat q 5 minutes PRN up to 2 times, then call 911, Disp: 25 tablet, Rfl: 2    Allergies: Acetaminophen    Physical Exam  Constitutional:       General: She is not in acute distress.     Appearance: She is not toxic-appearing.   HENT:      Head: Normocephalic and atraumatic.      Right Ear: Ear canal and external ear normal.      Left Ear: Ear canal and external ear normal.      Nose: Nose normal.      Mouth/Throat:      Mouth: Mucous membranes are moist.      Pharynx: Oropharynx is clear.   Eyes:      General: No scleral icterus.     Extraocular Movements: Extraocular movements intact.      Conjunctiva/sclera: Conjunctivae normal.      Pupils: Pupils are equal, round, and reactive to light.   Neck:      Vascular: No carotid bruit.   Cardiovascular:      Rate and Rhythm: Normal rate and regular rhythm.      Pulses: Normal pulses.      Heart sounds: Normal heart sounds.   Pulmonary:      Effort: Pulmonary effort is normal.      Breath sounds: Normal breath sounds.   Chest:      Chest wall: No tenderness.   Abdominal:      General: Bowel sounds are normal. There is no distension.      Palpations: Abdomen is soft.      Tenderness: There is no abdominal tenderness. There is no guarding or rebound.   Musculoskeletal:         General: No swelling, tenderness or deformity. Normal range of motion.      Cervical back: Normal range of motion. No rigidity.      Right lower leg: No edema.      Left lower leg: No edema.   Lymphadenopathy:      Cervical: No cervical adenopathy.  "  Skin:     General: Skin is warm and dry.      Capillary Refill: Capillary refill takes less than 2 seconds.      Coloration: Skin is not pale.      Findings: No erythema or rash.   Neurological:      General: No focal deficit present.      Mental Status: She is alert and oriented to person, place, and time.      Cranial Nerves: No cranial nerve deficit.      Motor: No weakness.      Coordination: Coordination normal.      Gait: Gait normal.   Psychiatric:         Attention and Perception: Attention and perception normal.         Mood and Affect: Mood is anxious.         Speech: Speech is rapid and pressured.         Behavior: Behavior normal.         Thought Content: Thought content normal. Thought content is not paranoid or delusional. Thought content does not include homicidal or suicidal ideation.         Cognition and Memory: Cognition and memory normal.         Judgment: Judgment normal.      Comments: Patient did seem a bit more anxious than usual today, but she admits that she is making final preparations for her trip to [place], and after expressing her concerns she was calm and appropriate.  She is always pleasant, and does not appear depressed                   Assessment and Plan   Diagnoses and all orders for this visit:    1. Type 2 diabetes mellitus with stage 3b chronic kidney disease, without long-term current use of insulin (HCC) (Primary)  Patient was diagnosed with type II but diabetes about the time that her GIST tumor was discovered, and her GI doctor felt like it was because of the tumor, and ever since she had surgery her glucose has been lower than the diabetic range, with all of her subsequent hemoglobin A1c is being below 6.5, even without any medications.  I have explained in the past that it is hard to \"undiagnosed\" someone with diabetes, but she is clearly been in the normal glycemic or sometimes mildly prediabetic range only, without any medication or treatment.  I will refill her " medications, she will continue to monitor blood pressure, and I will check the BMP today as the cardiologist requested.  If all goes well I will see her back in 6 months when she comes back to [place], but she will be sure to let me know if there are any problems in the interim.  2. Primary osteoarthritis involving multiple joints  Patient does continue to keep some tramadol on hand for as needed use, although she very rarely takes it.  3. Gastroesophageal reflux disease without esophagitis    4. H/O malignant gastrointestinal stromal tumor (GIST)  Fortunately there is never been any evidence of recurrence of the tumor  5. History of carotid endarterectomy    6. Mixed hyperlipidemia    7. Essential hypertension, benign    8. Coronary artery disease involving native coronary artery of native heart without angina pectoris  Currently asymptomatic, and does exercise regularly and was praised for this  9. Encounter for long-term (current) use of other medications  -     Basic Metabolic Panel; Future  -     Basic Metabolic Panel    10. Acquired hypothyroidism    11. Fatigue, unspecified type    12. Lumbar spondylosis    13. Hx of non-ST elevation myocardial infarction (NSTEMI)    14. MYRON (generalized anxiety disorder)    15. Deficiency anemia    Other orders  -     pentosan polysulfate (ELMIRON) 100 MG capsule; Take 1 capsule by mouth 2 (Two) Times a Day.  Dispense: 180 capsule; Refill: 3  -     nitroglycerin (Nitrostat) 0.4 MG SL tablet; Place one SL prn chest pain, may repeat q 5 minutes PRN up to 2 times, then call 911  Dispense: 25 tablet; Refill: 2            Follow Up   Return in about 6 months (around 4/14/2023) for Annual physical.  Patient was given instructions and counseling regarding her condition or for health maintenance advice. Please see specific information pulled into the AVS if appropriate.     Thong Landin MD

## 2022-10-26 RX ORDER — LOSARTAN POTASSIUM 50 MG/1
50 TABLET ORAL DAILY
Status: CANCELLED | OUTPATIENT
Start: 2022-10-26

## 2022-10-26 NOTE — TELEPHONE ENCOUNTER
Caller: Cristiane Harris    Relationship: Self    Best call back number: [phone]    Requested Prescriptions:   Requested Prescriptions     Pending Prescriptions Disp Refills   • losartan (COZAAR) 50 MG tablet       Sig: Take 1 tablet by mouth Daily.        Pharmacy where request should be sent: [facility] - [address] [phone] [facility] [phone] FX     Additional details provided by patient: PATIENT WOULD LIKE 90 DAY SUPPLY PLEASE.      Does the patient have less than a 3 day supply:  [] Yes  [x] No    Lorna Ma   10/26/22 09:39 EDT

## 2022-10-29 RX ORDER — LOSARTAN POTASSIUM 100 MG/1
TABLET ORAL
Qty: 90 TABLET | Refills: 0 | Status: CANCELLED | OUTPATIENT
Start: 2022-10-29

## 2022-10-29 RX ORDER — LOSARTAN POTASSIUM 50 MG/1
50 TABLET ORAL DAILY
Qty: 90 TABLET | Refills: 3 | Status: SHIPPED | OUTPATIENT
Start: 2022-10-29

## 2022-12-12 RX ORDER — HYDROCHLOROTHIAZIDE 25 MG/1
25 TABLET ORAL DAILY
Qty: 90 TABLET | Refills: 1 | Status: CANCELLED | OUTPATIENT
Start: 2022-12-12

## 2022-12-12 RX ORDER — HYDROCHLOROTHIAZIDE 25 MG/1
25 TABLET ORAL DAILY
Qty: 90 TABLET | Refills: 1 | Status: SHIPPED | OUTPATIENT
Start: 2022-12-12

## 2022-12-12 NOTE — TELEPHONE ENCOUNTER
Caller: Harris Cristiane H    Relationship: Self    Best call back number: [phone]  Requested Prescriptions:   Requested Prescriptions     Pending Prescriptions Disp Refills   • hydroCHLOROthiazide (HYDRODIURIL) 25 MG tablet 90 tablet 1     Sig: Take 1 tablet by mouth Daily.        Pharmacy where request should be sent: [facility] - [address] [phone] [facility] [phone] FX     Additional details provided by patient: PLEASE REFILL     Does the patient have less than a 3 day supply:  [] Yes  [x] No    Would you like a call back once the refill request has been completed: [] Yes [x] No    If the office needs to give you a call back, can they leave a voicemail: [] Yes [x] No    Regino Martel Rep   12/12/22 15:17 EST

## 2023-03-06 ENCOUNTER — TELEPHONE (OUTPATIENT)
Dept: FAMILY MEDICINE CLINIC | Facility: CLINIC | Age: 88
End: 2023-03-06

## 2023-03-06 NOTE — TELEPHONE ENCOUNTER
Caller: Cristiane Harris    Relationship to patient: Self    Best call back number: [phone]    Patient is needing: PATIENT STATED THAT SHE WAS CALLING TO LET PROVIDER KNOW THE BLOOD WORK RESULTS WILL BE FAXED TODAY FROM [place] AND SHE WOULD LIKE TO HAVE HIM TAKE A LOOK AND LET HER KNOW IF ANY OF THOSE RESULTS ARE A CONCERN    PLEASE ADVISE

## 2023-06-13 ENCOUNTER — OFFICE VISIT (OUTPATIENT)
Dept: FAMILY MEDICINE CLINIC | Facility: CLINIC | Age: 88
End: 2023-06-13
Payer: MEDICARE

## 2023-06-13 ENCOUNTER — TELEPHONE (OUTPATIENT)
Dept: FAMILY MEDICINE CLINIC | Facility: CLINIC | Age: 88
End: 2023-06-13

## 2023-06-13 VITALS
DIASTOLIC BLOOD PRESSURE: 68 MMHG | SYSTOLIC BLOOD PRESSURE: 120 MMHG | BODY MASS INDEX: 19.54 KG/M2 | HEIGHT: 62 IN | WEIGHT: 106.2 LBS

## 2023-06-13 DIAGNOSIS — M15.9 PRIMARY OSTEOARTHRITIS INVOLVING MULTIPLE JOINTS: ICD-10-CM

## 2023-06-13 DIAGNOSIS — E78.5 HYPERLIPIDEMIA LDL GOAL <100: ICD-10-CM

## 2023-06-13 DIAGNOSIS — M47.816 LUMBAR SPONDYLOSIS: ICD-10-CM

## 2023-06-13 DIAGNOSIS — Z85.09 H/O MALIGNANT GASTROINTESTINAL STROMAL TUMOR (GIST): ICD-10-CM

## 2023-06-13 DIAGNOSIS — E03.9 ACQUIRED HYPOTHYROIDISM: ICD-10-CM

## 2023-06-13 DIAGNOSIS — Z98.890 HISTORY OF CAROTID ENDARTERECTOMY: ICD-10-CM

## 2023-06-13 DIAGNOSIS — E11.22 TYPE 2 DIABETES MELLITUS WITH STAGE 3B CHRONIC KIDNEY DISEASE, WITHOUT LONG-TERM CURRENT USE OF INSULIN: ICD-10-CM

## 2023-06-13 DIAGNOSIS — E55.9 VITAMIN D DEFICIENCY: ICD-10-CM

## 2023-06-13 DIAGNOSIS — N18.32 TYPE 2 DIABETES MELLITUS WITH STAGE 3B CHRONIC KIDNEY DISEASE, WITHOUT LONG-TERM CURRENT USE OF INSULIN: ICD-10-CM

## 2023-06-13 DIAGNOSIS — R53.83 OTHER FATIGUE: ICD-10-CM

## 2023-06-13 DIAGNOSIS — K21.9 GASTROESOPHAGEAL REFLUX DISEASE WITHOUT ESOPHAGITIS: ICD-10-CM

## 2023-06-13 DIAGNOSIS — Z79.899 ENCOUNTER FOR LONG-TERM (CURRENT) USE OF OTHER MEDICATIONS: ICD-10-CM

## 2023-06-13 DIAGNOSIS — F41.1 GAD (GENERALIZED ANXIETY DISORDER): ICD-10-CM

## 2023-06-13 DIAGNOSIS — E78.2 MIXED HYPERLIPIDEMIA: ICD-10-CM

## 2023-06-13 DIAGNOSIS — Z13.820 SCREENING FOR OSTEOPOROSIS: ICD-10-CM

## 2023-06-13 DIAGNOSIS — I25.10 CORONARY ARTERY DISEASE INVOLVING NATIVE CORONARY ARTERY OF NATIVE HEART WITHOUT ANGINA PECTORIS: ICD-10-CM

## 2023-06-13 DIAGNOSIS — D53.9 DEFICIENCY ANEMIA: ICD-10-CM

## 2023-06-13 DIAGNOSIS — I65.23 BILATERAL CAROTID ARTERY STENOSIS: ICD-10-CM

## 2023-06-13 DIAGNOSIS — N95.9 MENOPAUSAL PROBLEM: ICD-10-CM

## 2023-06-13 DIAGNOSIS — I10 ESSENTIAL HYPERTENSION, BENIGN: Primary | ICD-10-CM

## 2023-06-13 DIAGNOSIS — D49.2 NEOPLASM OF SKIN OF FINGER: ICD-10-CM

## 2023-06-13 DIAGNOSIS — Z12.31 SCREENING MAMMOGRAM FOR BREAST CANCER: ICD-10-CM

## 2023-06-13 RX ORDER — LOSARTAN POTASSIUM 50 MG/1
50 TABLET ORAL DAILY
Qty: 90 TABLET | Refills: 3 | Status: SHIPPED | OUTPATIENT
Start: 2023-06-13

## 2023-06-13 RX ORDER — HYDROCHLOROTHIAZIDE 25 MG/1
25 TABLET ORAL DAILY
Qty: 90 TABLET | Refills: 1 | Status: SHIPPED | OUTPATIENT
Start: 2023-06-13

## 2023-06-13 RX ORDER — SIMVASTATIN 20 MG
20 TABLET ORAL NIGHTLY
Qty: 90 TABLET | Refills: 3 | Status: SHIPPED | OUTPATIENT
Start: 2023-06-13

## 2023-06-13 NOTE — TELEPHONE ENCOUNTER
Pushpa, thank you for your prompt attention to this patient's referral to dermatology.  As I mentioned, if you face any resistance and I need to speak with the dermatologist personally, I will be glad to do so.  Alternatively, if they tell you that there is absolutely no way that they can see her within the next 2 weeks (the sooner the better), then please let me know and I may have to refer her to a general surgeon.  Either way, I would appreciate if you would shoot me a quick note when you have the information or know what is going on.  I have finished entering her clinic note now in case you need it.  Thank you!

## 2023-06-13 NOTE — PROGRESS NOTES
"Chief Complaint  Diabetes    Subjective      Cristiane Harris presents to [facility]  History of Present Illness  Patient is here for checkup on diabetes, hypertension, and other medical issues.  She was in [place] back in February and saw the physician that she goes to when she is \"snowboarding\" down there, and had some labs done and forwarded a copy of the results to me.  Her TSH was 8.9, but there was no free T4 done.  We discussed the pros and cons of starting on levothyroxine, and currently she denies any symptoms of hypothyroidism.  However, I told her that we really do need to check the labs again and check the free T4 as well before we make a definite decision.    The patient will be seeing her cardiologist this afternoon, but currently has no cardiovascular symptoms.  She says she did fall a few months ago and struck the back of her head, requiring a visit to the emergency department for sutures, but there was no loss of consciousness, and her head scan was normal.  Her hypertension and diabetes continue to be very well controlled.  She says she is very, very rarely takes the tramadol for her arthritis pain, and still does not need a prescription even on the last prescription was sent in about 2 years ago.    The patient still only takes Nexium as needed for acid reflux, and not on a regular basis.  Her only new complaint is that she notes some dark spots under the nail of her left index finger that been present for about 2 months, and it does appear that the area has spread a bit in the last 2 months, and it will not wipe off, come off of the nail file, or even wipe off with Clorox or rubbing alcohol.  Therefore, she is suspicious that the lesion is not in the nail but rather in the nailbed and could represent a melanoma or something pathologic, since she does not recall any trauma to the nail.  Objective   Vital Signs:   Vitals:    06/13/23 1249   BP: 120/68   BP Location: " "Left arm   Patient Position: Sitting   Cuff Size: Adult   Weight: 48.2 kg (106 lb 3.2 oz)   Height: 156.2 cm (61.5\")      /68 (BP Location: Left arm, Patient Position: Sitting, Cuff Size: Adult)   Ht 156.2 cm (61.5\")   Wt 48.2 kg (106 lb 3.2 oz)   BMI 19.74 kg/m²     Body mass index is 19.74 kg/m².    Review of Systems   Constitutional:  Negative for activity change, appetite change, chills and fever.   HENT:  Negative for ear pain, mouth sores, nosebleeds, sore throat, swollen glands and trouble swallowing.    Eyes:  Negative for visual disturbance.   Respiratory:  Negative for cough, choking, chest tightness and shortness of breath.    Cardiovascular:  Negative for chest pain, palpitations and leg swelling.   Gastrointestinal:  Positive for GERD (Occasional). Negative for abdominal pain, blood in stool, constipation, diarrhea and nausea.   Endocrine: Negative for polydipsia, polyphagia and polyuria.   Genitourinary:  Negative for dysuria and hematuria.   Musculoskeletal:  Positive for arthralgias and gait problem (Patient does often use a cane due to limited range of motion in the right knee). Negative for joint swelling, myalgias and neck pain.   Skin:  Positive for skin lesions (Left index finger subungual dark spot). Negative for bruise.   Neurological:  Negative for dizziness, tremors, seizures, syncope, facial asymmetry, speech difficulty, weakness, light-headedness, numbness, headache and memory problem.   Hematological:  Negative for adenopathy.   Psychiatric/Behavioral:  Negative for sleep disturbance and depressed mood. The patient is not nervous/anxious.      Past History:  Medical History: has a past medical history of Anemia, Arthritis, GERD (gastroesophageal reflux disease), GIST (gastrointestinal stromal tumor), non-malignant, Hyperlipemia, Hypertension, Skin cancer, and Stenosis of carotid artery.   Surgical History: has a past surgical history that includes Thromboendarterectomy (Right, " 11/20/2012); Other surgical history; Carotid Endarterectomy (Left); Breast surgery (2011); Tumor removal; Whipple Procedure; Cataract extraction w/ intraocular lens implant; Joint replacement; Total knee arthroplasty (Right); and Esophagogastroduodenoscopy (N/A, 7/3/2018).   Family History: family history includes Aneurysm in her father; Cancer in her mother and sister; Coronary artery disease in her father; Diabetes in her father.   Social History: reports that she has never smoked. She has never used smokeless tobacco. She reports that she does not drink alcohol and does not use drugs.      Current Outpatient Medications:     aspirin 81 MG tablet, Take 1 tablet by mouth Daily., Disp: , Rfl:     dorzolamide (TRUSOPT) 2 % ophthalmic solution, Administer 1 drop to both eyes 2 (Two) Times a Day., Disp: , Rfl:     esomeprazole (nexIUM) 20 MG capsule, Take 1 capsule by mouth Daily As Needed (acid reflux)., Disp: 90 capsule, Rfl: 1    hydroCHLOROthiazide (HYDRODIURIL) 25 MG tablet, Take 1 tablet by mouth Daily., Disp: 90 tablet, Rfl: 1    imatinib (GLEEVEC) 100 MG chemo tablet, Take 1 tablet by mouth Daily., Disp: , Rfl:     losartan (COZAAR) 50 MG tablet, Take 1 tablet by mouth Daily., Disp: 90 tablet, Rfl: 3    Multiple Vitamins-Minerals (MULTIVITAMIN PO), Take 1 tablet by mouth Daily., Disp: , Rfl:     nitroglycerin (Nitrostat) 0.4 MG SL tablet, Place one SL prn chest pain, may repeat q 5 minutes PRN up to 2 times, then call 911, Disp: 25 tablet, Rfl: 2    pentosan polysulfate (ELMIRON) 100 MG capsule, Take 1 capsule by mouth 2 (Two) Times a Day., Disp: 180 capsule, Rfl: 3    simvastatin (ZOCOR) 20 MG tablet, Take 1 tablet by mouth Every Night., Disp: 90 tablet, Rfl: 3    traMADol (ULTRAM) 50 MG tablet, take 1-2 pills po [id number] prn pain as needed, Disp: , Rfl:     TRAVATAN Z 0.004 % solution ophthalmic solution, Administer 1 drop to both eyes Daily., Disp: , Rfl:     Allergies: Acetaminophen    Physical  Exam  Constitutional:       General: She is not in acute distress.     Appearance: She is not toxic-appearing.   HENT:      Head: Normocephalic and atraumatic.      Right Ear: Tympanic membrane, ear canal and external ear normal.      Left Ear: Tympanic membrane, ear canal and external ear normal.      Nose: Nose normal.      Mouth/Throat:      Mouth: Mucous membranes are moist.      Pharynx: Oropharynx is clear.   Eyes:      General: No scleral icterus.     Extraocular Movements: Extraocular movements intact.      Conjunctiva/sclera: Conjunctivae normal.      Pupils: Pupils are equal, round, and reactive to light.   Neck:      Vascular: No carotid bruit.   Cardiovascular:      Rate and Rhythm: Normal rate and regular rhythm.      Pulses: Normal pulses.      Heart sounds: Normal heart sounds.   Pulmonary:      Effort: Pulmonary effort is normal.      Breath sounds: Normal breath sounds. No wheezing, rhonchi or rales.   Chest:      Chest wall: No tenderness.   Abdominal:      General: Bowel sounds are normal. There is no distension.      Palpations: Abdomen is soft. There is no mass.      Tenderness: There is no abdominal tenderness. There is no guarding or rebound.   Musculoskeletal:         General: Deformity (Moderate osteoarthritis findings of both hands) present. No swelling. Normal range of motion.      Cervical back: Normal range of motion. No rigidity.      Right lower leg: No edema.      Left lower leg: No edema.   Lymphadenopathy:      Cervical: No cervical adenopathy.   Skin:     General: Skin is warm and dry.      Capillary Refill: Capillary refill takes less than 2 seconds.      Coloration: Skin is not pale.      Findings: Lesion (Patient has a 4 to 5 mm irregular black and blue spot under the nail of her left index finger that looks worrisome) present. No erythema or rash.   Neurological:      General: No focal deficit present.      Mental Status: She is alert and oriented to person, place, and time.       Cranial Nerves: No cranial nerve deficit.      Sensory: No sensory deficit.      Motor: No weakness.      Coordination: Coordination normal.      Gait: Gait abnormal (Uses a cane due to difficulty fully extending the right knee).   Psychiatric:         Mood and Affect: Mood normal.         Behavior: Behavior normal.         Thought Content: Thought content normal.         Judgment: Judgment normal.                 Assessment and Plan   Diagnoses and all orders for this visit:    1. Essential hypertension, benign (Primary)  Patient will continue current medications.  The labs done in [place] a few months ago showed a GFR down to 38, and we discussed stage IIIb chronic kidney disease, which is only slightly worse than her baseline, but we will recheck labs today.  She understands we may need to cut back on her hydrochlorothiazide, but usually do not have to refer to nephrology unless the GFR drops down closer to 30, unless there are other circumstances that warrant referral.  She denies any UTI symptoms  2. Mixed hyperlipidemia  -     Lipid Panel    3. Encounter for long-term (current) use of other medications  -     CBC & Differential  -     Comprehensive Metabolic Panel  -     Magnesium    4. Coronary artery disease involving native coronary artery of native heart without angina pectoris  Patient continues to be asymptomatic from this, is comanaged with cardiology, and doing well with 81 mg of aspirin and 20 mg of simvastatin, along with her antihypertensive medications.  She used to be on diltiazem but this had to be stopped due to hypotension, and has not been able to tolerate low-dose calcium channel blockers or beta-blockers for this, but fortunately has no angina  5. History of carotid endarterectomy  Patient will be scheduled for yearly follow-up carotid Dopplers to monitor this  6. H/O malignant gastrointestinal stromal tumor (GIST)  Patient has been followed carefully by oncology over the years and  fortunately has not shown any recurrence of this  7. Lumbar spondylosis    8. Primary osteoarthritis involving multiple joints    9. Gastroesophageal reflux disease without esophagitis    10. Type 2 diabetes mellitus with stage 3b chronic kidney disease, without long-term current use of insulin  -     Hemoglobin A1c  Patient's diabetes has been under excellent control with lifestyle modification only, has not needed medication for this.  Her A1c has been around 6 and we will recheck today  11. Other fatigue  -     TSH+Free T4    12. Acquired hypothyroidism  -     TSH+Free T4    13. MYRON (generalized anxiety disorder)  So far the patient has not needed medication for this, and controls it well through prayer, relaxation techniques, and talking with family and friends, and does not think she needs any medication today.  She has not had any depression symptoms either  14. Deficiency anemia  -     Vitamin B12  -     Ferritin  -     Iron  -     Folate    15. Vitamin D deficiency  -     Vitamin D,25-Hydroxy    16. Hyperlipidemia LDL goal <100  -     simvastatin (ZOCOR) 20 MG tablet; Take 1 tablet by mouth Every Night.  Dispense: 90 tablet; Refill: 3    17. Neoplasm of skin of finger  -     Ambulatory Referral to Dermatology  Patient understands the lesion looks suspicious for melanoma so we will call dermatology and get her in right away.  If there is some type of hanging up in we absolutely cannot get her in with dermatology, then we may have to refer to general surgery.  She said the lesion has been there for 2 months, and she is certain that she has not traumatized the finger, and the dark spot will not come off of the nail file, or the use of Clorox or rubbing alcohol, so it does appear to be a worrisome lesion that needs dermatology consultation and she understands  18. Bilateral carotid artery stenosis  -     Duplex Carotid Ultrasound CAR; Future    19. Menopausal problem  -     DEXA Bone Density Axial; Future    20.  Screening for osteoporosis  -     DEXA Bone Density Axial; Future    21. Screening mammogram for breast cancer  -     Mammo Screening Bilateral With CAD; Future  Patient understands that doing mammograms in someone her age is very controversy ill, but she wishes to continue doing them and we will honor her wishes and get this scheduled  Other orders  -     hydroCHLOROthiazide (HYDRODIURIL) 25 MG tablet; Take 1 tablet by mouth Daily.  Dispense: 90 tablet; Refill: 1  -     losartan (COZAAR) 50 MG tablet; Take 1 tablet by mouth Daily.  Dispense: 90 tablet; Refill: 3  -     pentosan polysulfate (ELMIRON) 100 MG capsule; Take 1 capsule by mouth 2 (Two) Times a Day.  Dispense: 180 capsule; Refill: 3            Follow Up   Return in about 6 months (around 12/13/2023) for Annual physical.  Patient was given instructions and counseling regarding her condition or for health maintenance advice. Please see specific information pulled into the AVS if appropriate.     Thong Landin MD

## 2023-06-14 LAB
25(OH)D3+25(OH)D2 SERPL-MCNC: 38.6 NG/ML (ref 30–100)
ALBUMIN SERPL-MCNC: 4.4 G/DL (ref 3.5–4.6)
ALBUMIN/GLOB SERPL: 1.6 {RATIO} (ref 1.2–2.2)
ALP SERPL-CCNC: 99 IU/L (ref 44–121)
ALT SERPL-CCNC: 31 IU/L (ref 0–32)
AST SERPL-CCNC: 34 IU/L (ref 0–40)
BASOPHILS # BLD AUTO: 0 X10E3/UL (ref 0–0.2)
BASOPHILS NFR BLD AUTO: 1 %
BILIRUB SERPL-MCNC: 0.3 MG/DL (ref 0–1.2)
BUN SERPL-MCNC: 28 MG/DL (ref 10–36)
BUN/CREAT SERPL: 23 (ref 12–28)
CALCIUM SERPL-MCNC: 9.9 MG/DL (ref 8.7–10.3)
CHLORIDE SERPL-SCNC: 105 MMOL/L (ref 96–106)
CHOLEST SERPL-MCNC: 117 MG/DL (ref 100–199)
CO2 SERPL-SCNC: 22 MMOL/L (ref 20–29)
CREAT SERPL-MCNC: 1.22 MG/DL (ref 0.57–1)
EGFRCR SERPLBLD CKD-EPI 2021: 41 ML/MIN/1.73
EOSINOPHIL # BLD AUTO: 0.2 X10E3/UL (ref 0–0.4)
EOSINOPHIL NFR BLD AUTO: 3 %
ERYTHROCYTE [DISTWIDTH] IN BLOOD BY AUTOMATED COUNT: 11.8 % (ref 11.7–15.4)
FERRITIN SERPL-MCNC: 43 NG/ML (ref 15–150)
FOLATE SERPL-MCNC: >20 NG/ML
GLOBULIN SER CALC-MCNC: 2.8 G/DL (ref 1.5–4.5)
GLUCOSE SERPL-MCNC: 107 MG/DL (ref 70–99)
HBA1C MFR BLD: 5.8 % (ref 4.8–5.6)
HCT VFR BLD AUTO: 34.1 % (ref 34–46.6)
HDLC SERPL-MCNC: 51 MG/DL
HGB BLD-MCNC: 11.6 G/DL (ref 11.1–15.9)
IMM GRANULOCYTES # BLD AUTO: 0 X10E3/UL (ref 0–0.1)
IMM GRANULOCYTES NFR BLD AUTO: 0 %
IRON SERPL-MCNC: 107 UG/DL (ref 27–139)
LDLC SERPL CALC-MCNC: 41 MG/DL (ref 0–99)
LYMPHOCYTES # BLD AUTO: 1.9 X10E3/UL (ref 0.7–3.1)
LYMPHOCYTES NFR BLD AUTO: 33 %
MAGNESIUM SERPL-MCNC: 2.2 MG/DL (ref 1.6–2.3)
MCH RBC QN AUTO: 34 PG (ref 26.6–33)
MCHC RBC AUTO-ENTMCNC: 34 G/DL (ref 31.5–35.7)
MCV RBC AUTO: 100 FL (ref 79–97)
MONOCYTES # BLD AUTO: 0.6 X10E3/UL (ref 0.1–0.9)
MONOCYTES NFR BLD AUTO: 11 %
NEUTROPHILS # BLD AUTO: 2.9 X10E3/UL (ref 1.4–7)
NEUTROPHILS NFR BLD AUTO: 52 %
PLATELET # BLD AUTO: 264 X10E3/UL (ref 150–450)
POTASSIUM SERPL-SCNC: 3.7 MMOL/L (ref 3.5–5.2)
PROT SERPL-MCNC: 7.2 G/DL (ref 6–8.5)
RBC # BLD AUTO: 3.41 X10E6/UL (ref 3.77–5.28)
SODIUM SERPL-SCNC: 144 MMOL/L (ref 134–144)
T4 FREE SERPL-MCNC: 1.33 NG/DL (ref 0.82–1.77)
TRIGL SERPL-MCNC: 150 MG/DL (ref 0–149)
TSH SERPL DL<=0.005 MIU/L-ACNC: 3.21 UIU/ML (ref 0.45–4.5)
VIT B12 SERPL-MCNC: 751 PG/ML (ref 232–1245)
VLDLC SERPL CALC-MCNC: 25 MG/DL (ref 5–40)
WBC # BLD AUTO: 5.6 X10E3/UL (ref 3.4–10.8)

## 2023-06-15 DIAGNOSIS — C49.11: Primary | ICD-10-CM

## 2023-06-16 ENCOUNTER — TELEPHONE (OUTPATIENT)
Dept: FAMILY MEDICINE CLINIC | Facility: CLINIC | Age: 88
End: 2023-06-16
Payer: MEDICARE

## 2023-06-16 NOTE — TELEPHONE ENCOUNTER
PATIENT CALLED BACK AND I READ THE HUB TO READ MESSAGE    PATIENT STATED SHE WILL CALL BACK IF SHE NEEDS TO CHANGE THIS LOCATION AS IT IS ABOUT 1 HOUR AWAY.

## 2023-06-16 NOTE — TELEPHONE ENCOUNTER
HUB TO READ    CALLED PT TO LET HER KNOW OF HER DERMATOLOGY APPOINTMENT IT IS WITH [facility] 6/20/2023 AT 1:45 PM AT THE [place] LOCATION SHE NEEDS TO TAKE HER INSURANCE CARD AND ID WITH HER.     [address] [id number]   [phone]

## 2023-10-12 ENCOUNTER — OFFICE VISIT (OUTPATIENT)
Dept: FAMILY MEDICINE CLINIC | Facility: CLINIC | Age: 88
End: 2023-10-12
Payer: MEDICARE

## 2023-10-12 VITALS
HEIGHT: 63 IN | SYSTOLIC BLOOD PRESSURE: 124 MMHG | DIASTOLIC BLOOD PRESSURE: 70 MMHG | HEART RATE: 66 BPM | OXYGEN SATURATION: 100 % | WEIGHT: 102.5 LBS | BODY MASS INDEX: 18.16 KG/M2

## 2023-10-12 DIAGNOSIS — I10 ESSENTIAL HYPERTENSION, BENIGN: ICD-10-CM

## 2023-10-12 DIAGNOSIS — C78.7 METASTASIS TO LIVER: Primary | ICD-10-CM

## 2023-10-12 DIAGNOSIS — F51.02 ADJUSTMENT INSOMNIA: ICD-10-CM

## 2023-10-12 DIAGNOSIS — Z79.899 ENCOUNTER FOR LONG-TERM (CURRENT) USE OF OTHER MEDICATIONS: ICD-10-CM

## 2023-10-12 DIAGNOSIS — F41.1 GAD (GENERALIZED ANXIETY DISORDER): ICD-10-CM

## 2023-10-12 PROCEDURE — 99214 OFFICE O/P EST MOD 30 MIN: CPT | Performed by: FAMILY MEDICINE

## 2023-10-12 RX ORDER — AMLODIPINE BESYLATE 5 MG/1
TABLET ORAL DAILY
COMMUNITY

## 2023-10-12 RX ORDER — HYDROCHLOROTHIAZIDE 25 MG/1
25 TABLET ORAL DAILY
Qty: 90 TABLET | Refills: 1 | Status: SHIPPED | OUTPATIENT
Start: 2023-10-12

## 2023-10-12 RX ORDER — SUNITINIB MALATE 37.5 MG/1
37.5 CAPSULE ORAL DAILY
COMMUNITY
Start: 2023-09-27

## 2023-10-12 RX ORDER — LOSARTAN POTASSIUM 100 MG/1
100 TABLET ORAL DAILY
Qty: 90 TABLET | Refills: 1 | Status: SHIPPED | OUTPATIENT
Start: 2023-10-12

## 2023-10-13 NOTE — PROGRESS NOTES
Chief Complaint  Hepatic Cancer (Dr. Pal dx [phone] [facility] states they found one month ago around Sept 26,2023 biopsy 3 weeks before )    Subjective      Cristiane Harris presents to [facility]  History of Present Illness  Patient is here to discuss her blood pressure management.  She says that her oncologist unfortunately found a metastatic lesion in her liver last month, and biopsy done September 2023 confirmed that it was recurrence of the GIST tumor.  She has started treatment, and was advised that the treatment could raise her blood pressure significantly, which it did.  She is therefore been placed on 100 mg of losartan instead of the previous 50 mg, in addition to amlodipine 5 mg daily, and her blood pressure was better today.  It has been running in the 140s to 150s over the last few days, but she just over the amlodipine a few days ago.  She denies any headaches blurred vision or cardiovascular symptoms.  The patient says she will be having lab work done with her oncologist next week.    The patient wanted to make sure that all of her vaccines are up-to-date before starting the chemotherapy, and she has gotten boosters of appropriate vaccines including the new RSV vaccine at the pharmacy last month.    The patient does mention having some intermittent insomnia issues lately, primarily difficulty maintaining sleep.  She denies any anxiety or depression symptoms other than obvious concerns about her health.  She says from time to time she has taken melatonin and she has found this helpful with no significant side effects.  She still exercises 3 days a week.  We discussed the pros and cons of different sleep aids, including melatonin, Benadryl, and various prescription medications that can be used to help with insomnia.  The patient has not taken and does not wish to take symptoms nightly, and I did explain that virtually anything that helps insomnia increases the  "risk for dizziness and falls in patients over 65, but that the best safety data seems to be with medicines like Belsomra, which is known to help maintain sleep.  She does not think she needs a prescription for this now, but if this changes she will let me know.  Objective   Vital Signs:   Vitals:    10/12/23 1328   BP: 124/70   BP Location: Left arm   Patient Position: Sitting   Cuff Size: Adult   Pulse: 66   SpO2: 100%   Weight: 46.5 kg (102 lb 8 oz)   Height: 160 cm (63\")      /70 (BP Location: Left arm, Patient Position: Sitting, Cuff Size: Adult)   Pulse 66   Ht 160 cm (63\")   Wt 46.5 kg (102 lb 8 oz)   SpO2 100%   BMI 18.16 kg/mý     Body mass index is 18.16 kg/mý.    Review of Systems   Constitutional:  Negative for activity change, appetite change, chills and fever.   HENT:  Negative for sore throat and trouble swallowing.    Eyes:  Negative for visual disturbance.   Respiratory:  Negative for cough, chest tightness and shortness of breath.    Cardiovascular:  Negative for chest pain and leg swelling.   Gastrointestinal:  Negative for abdominal pain and blood in stool.   Genitourinary:  Negative for dysuria and hematuria.   Musculoskeletal:  Negative for gait problem and joint swelling.   Neurological:  Negative for syncope, speech difficulty, headache and memory problem.   Hematological:  Negative for adenopathy.   Psychiatric/Behavioral:  Positive for sleep disturbance. Negative for depressed mood. The patient is not nervous/anxious.        Past History:  Medical History: has a past medical history of Anemia, Arthritis, GERD (gastroesophageal reflux disease), GIST (gastrointestinal stromal tumor), non-malignant, Hyperlipemia, Hypertension, Metastatic disease (2023), Skin cancer, and Stenosis of carotid artery.   Surgical History: has a past surgical history that includes Thromboendarterectomy (Right, 11/20/2012); Other surgical history; Carotid Endarterectomy (Left); Breast surgery (2011); Tumor " removal; Whipple Procedure; Cataract extraction w/ intraocular lens implant; Joint replacement; Total knee arthroplasty (Right); and Esophagogastroduodenoscopy (N/A, 7/3/2018).   Family History: family history includes Aneurysm in her father; Cancer in her mother and sister; Coronary artery disease in her father; Diabetes in her father.   Social History: reports that she has never smoked. She has never used smokeless tobacco. She reports that she does not drink alcohol and does not use drugs.      Current Outpatient Medications:     amLODIPine (NORVASC) 5 MG tablet, Take  by mouth Daily., Disp: , Rfl:     aspirin 81 MG tablet, Take 1 tablet by mouth Daily., Disp: , Rfl:     dorzolamide (TRUSOPT) 2 % ophthalmic solution, Administer 1 drop to both eyes 2 (Two) Times a Day., Disp: , Rfl:     esomeprazole (nexIUM) 20 MG capsule, Take 1 capsule by mouth Daily As Needed (acid reflux)., Disp: 90 capsule, Rfl: 1    hydroCHLOROthiazide (HYDRODIURIL) 25 MG tablet, Take 1 tablet by mouth Daily., Disp: 90 tablet, Rfl: 1    imatinib (GLEEVEC) 100 MG chemo tablet, Take 1 tablet by mouth Daily., Disp: , Rfl:     losartan (COZAAR) 50 MG tablet, Take 1 tablet by mouth Daily. (Patient taking differently: Take 2 tablets by mouth Daily.), Disp: 90 tablet, Rfl: 3    Multiple Vitamins-Minerals (MULTIVITAMIN PO), Take 1 tablet by mouth Daily., Disp: , Rfl:     nitroglycerin (Nitrostat) 0.4 MG SL tablet, Place one SL prn chest pain, may repeat q 5 minutes PRN up to 2 times, then call 911, Disp: 25 tablet, Rfl: 2    pentosan polysulfate (ELMIRON) 100 MG capsule, Take 1 capsule by mouth 2 (Two) Times a Day., Disp: 180 capsule, Rfl: 3    simvastatin (ZOCOR) 20 MG tablet, Take 1 tablet by mouth Every Night., Disp: 90 tablet, Rfl: 3    SUNItinib (SUTENT) 37.5 MG capsule chemo capsule, Take 1 capsule by mouth Daily., Disp: , Rfl:     traMADol (ULTRAM) 50 MG tablet, take 1-2 pills po [id number] prn pain as needed, Disp: , Rfl:     TRAVATAN Z 0.004 %  solution ophthalmic solution, Administer 1 drop to both eyes Daily., Disp: , Rfl:     Allergies: Acetaminophen    Physical Exam  Constitutional:       General: She is not in acute distress.     Appearance: She is not toxic-appearing.   HENT:      Head: Normocephalic.      Right Ear: Ear canal and external ear normal.      Left Ear: Ear canal and external ear normal.      Nose: Nose normal.      Mouth/Throat:      Mouth: Mucous membranes are moist.      Pharynx: Oropharynx is clear.   Eyes:      General: No scleral icterus.     Extraocular Movements: Extraocular movements intact.      Conjunctiva/sclera: Conjunctivae normal.      Pupils: Pupils are equal, round, and reactive to light.   Neck:      Vascular: No carotid bruit.   Cardiovascular:      Rate and Rhythm: Normal rate and regular rhythm.      Pulses: Normal pulses.      Heart sounds: Normal heart sounds.   Pulmonary:      Effort: Pulmonary effort is normal.      Breath sounds: Normal breath sounds.   Chest:      Chest wall: No tenderness.   Abdominal:      General: Bowel sounds are normal. There is no distension.      Palpations: Abdomen is soft. There is no mass.      Tenderness: There is no abdominal tenderness. There is no guarding or rebound.   Musculoskeletal:         General: No swelling or deformity. Normal range of motion.      Cervical back: Normal range of motion. No rigidity.      Right lower leg: No edema.      Left lower leg: No edema.   Lymphadenopathy:      Cervical: No cervical adenopathy.   Skin:     General: Skin is warm and dry.      Capillary Refill: Capillary refill takes less than 2 seconds.      Coloration: Skin is not pale.      Findings: No erythema or rash.   Neurological:      General: No focal deficit present.      Mental Status: She is alert and oriented to person, place, and time.      Cranial Nerves: No cranial nerve deficit.      Motor: No weakness.      Coordination: Coordination normal.      Gait: Gait normal.   Psychiatric:          Mood and Affect: Mood normal.         Behavior: Behavior normal.         Thought Content: Thought content normal.         Judgment: Judgment normal.                   Assessment and Plan   Diagnoses and all orders for this visit:    1. Metastasis to liver (Primary)  Patient will follow up with oncology about this  2. Essential hypertension, benign  Continue current blood pressure medicines and continue monitoring.  She will let me know if she needs refills on these prescriptions.  3. Encounter for long-term (current) use of other medications    4. MYRON (generalized anxiety disorder)  Patient says that even though she has had a bit of anxiety regarding the discovery of the liver metastasis, she feels overall that this problem is well controlled without medication and does not think she needs any treatment currently.  5. Adjustment insomnia  See details in HPI, patient will let me know if she thinks she needs treatment for this  Other orders  -     Discontinue: RSVPreF3 Vac Recomb Adjuvanted (AREXVY) 120 MCG/0.5ML reconstituted suspension injection; Inject 0.5 mL into the appropriate muscle as directed by prescriber 1 (One) Time for 1 dose.  Dispense: 0.5 mL; Refill: 0--THIS PRESCRIPTION WAS INADVERTENTLY ENTERED BEFORE I FOUND OUT SHE ALREADY GOT RSV VACCINES LAST MONTH,k so it was printed and destroyed, but the program will not let me delete it  -     hydroCHLOROthiazide (HYDRODIURIL) 25 MG tablet; Take 1 tablet by mouth Daily.  Dispense: 90 tablet; Refill: 1            Follow Up   No follow-ups on file.  Patient was given instructions and counseling regarding her condition or for health maintenance advice. Please see specific information pulled into the AVS if appropriate.     Thong Landin MD

## 2023-12-20 RX ORDER — HYDROCHLOROTHIAZIDE 25 MG/1
25 TABLET ORAL DAILY
Qty: 90 TABLET | Refills: 3 | OUTPATIENT
Start: 2023-12-20

## 2024-02-21 ENCOUNTER — TELEPHONE (OUTPATIENT)
Dept: FAMILY MEDICINE CLINIC | Facility: CLINIC | Age: 89
End: 2024-02-21
Payer: MEDICARE

## 2024-02-21 NOTE — TELEPHONE ENCOUNTER
"Relay     \"COULD NOT LEAVE MESSAGE. PATIENT IS DUE FOR ANNUAL WELLNESS VISIT. PLEASE SCHEDULE\"                "

## 2024-06-12 DIAGNOSIS — E78.5 HYPERLIPIDEMIA LDL GOAL <100: ICD-10-CM

## 2024-06-12 RX ORDER — SIMVASTATIN 20 MG
20 TABLET ORAL NIGHTLY
Qty: 90 TABLET | Refills: 3 | OUTPATIENT
Start: 2024-06-12

## 2024-07-25 ENCOUNTER — TELEPHONE (OUTPATIENT)
Dept: FAMILY MEDICINE CLINIC | Facility: CLINIC | Age: 89
End: 2024-07-25

## 2024-07-25 ENCOUNTER — OFFICE VISIT (OUTPATIENT)
Dept: FAMILY MEDICINE CLINIC | Facility: CLINIC | Age: 89
End: 2024-07-25
Payer: MEDICARE

## 2024-07-25 VITALS
HEART RATE: 50 BPM | HEIGHT: 63 IN | WEIGHT: 98 LBS | DIASTOLIC BLOOD PRESSURE: 68 MMHG | SYSTOLIC BLOOD PRESSURE: 130 MMHG | OXYGEN SATURATION: 100 % | BODY MASS INDEX: 17.36 KG/M2

## 2024-07-25 DIAGNOSIS — Z00.01 ENCOUNTER FOR GENERAL ADULT MEDICAL EXAMINATION WITH ABNORMAL FINDINGS: ICD-10-CM

## 2024-07-25 DIAGNOSIS — E78.5 HYPERLIPIDEMIA LDL GOAL <100: ICD-10-CM

## 2024-07-25 DIAGNOSIS — Z79.899 ENCOUNTER FOR LONG-TERM (CURRENT) USE OF OTHER MEDICATIONS: ICD-10-CM

## 2024-07-25 DIAGNOSIS — I25.10 CORONARY ARTERY DISEASE INVOLVING NATIVE CORONARY ARTERY OF NATIVE HEART WITHOUT ANGINA PECTORIS: ICD-10-CM

## 2024-07-25 DIAGNOSIS — C78.7 SECONDARY MALIGNANCY OF LIVER: ICD-10-CM

## 2024-07-25 DIAGNOSIS — R19.7 DIARRHEA, UNSPECIFIED TYPE: ICD-10-CM

## 2024-07-25 DIAGNOSIS — E87.6 HYPOKALEMIA: ICD-10-CM

## 2024-07-25 DIAGNOSIS — E11.22 TYPE 2 DIABETES MELLITUS WITH STAGE 3B CHRONIC KIDNEY DISEASE, WITHOUT LONG-TERM CURRENT USE OF INSULIN: ICD-10-CM

## 2024-07-25 DIAGNOSIS — C44.42 SQUAMOUS CELL CANCER OF SKIN OF CROWN: ICD-10-CM

## 2024-07-25 DIAGNOSIS — M47.816 LUMBAR SPONDYLOSIS: ICD-10-CM

## 2024-07-25 DIAGNOSIS — E53.8 B12 DEFICIENCY: ICD-10-CM

## 2024-07-25 DIAGNOSIS — Z98.890 HISTORY OF CAROTID ENDARTERECTOMY: ICD-10-CM

## 2024-07-25 DIAGNOSIS — N18.32 TYPE 2 DIABETES MELLITUS WITH STAGE 3B CHRONIC KIDNEY DISEASE, WITHOUT LONG-TERM CURRENT USE OF INSULIN: ICD-10-CM

## 2024-07-25 DIAGNOSIS — E78.2 MIXED HYPERLIPIDEMIA: ICD-10-CM

## 2024-07-25 DIAGNOSIS — Z00.00 MEDICARE ANNUAL WELLNESS VISIT, SUBSEQUENT: Primary | ICD-10-CM

## 2024-07-25 DIAGNOSIS — R53.83 OTHER FATIGUE: ICD-10-CM

## 2024-07-25 DIAGNOSIS — I10 ESSENTIAL HYPERTENSION, BENIGN: ICD-10-CM

## 2024-07-25 DIAGNOSIS — E55.9 VITAMIN D DEFICIENCY: ICD-10-CM

## 2024-07-25 DIAGNOSIS — E03.9 ACQUIRED HYPOTHYROIDISM: ICD-10-CM

## 2024-07-25 DIAGNOSIS — C49.A9 MALIGNANT GASTROINTESTINAL STROMAL TUMOR (GIST) OF OTHER SITE: ICD-10-CM

## 2024-07-25 DIAGNOSIS — R53.0 NEOPLASTIC MALIGNANT RELATED FATIGUE: ICD-10-CM

## 2024-07-25 DIAGNOSIS — K21.9 GASTROESOPHAGEAL REFLUX DISEASE WITHOUT ESOPHAGITIS: ICD-10-CM

## 2024-07-25 DIAGNOSIS — M15.9 PRIMARY OSTEOARTHRITIS INVOLVING MULTIPLE JOINTS: ICD-10-CM

## 2024-07-25 PROBLEM — C49.9: Status: ACTIVE | Noted: 2024-07-25

## 2024-07-25 LAB
EXPIRATION DATE: NORMAL
Lab: NORMAL
POC CREATININE URINE: 50
POC MICROALBUMIN URINE: 30

## 2024-07-25 RX ORDER — SIMVASTATIN 20 MG
20 TABLET ORAL NIGHTLY
Qty: 90 TABLET | Refills: 3 | Status: SHIPPED | OUTPATIENT
Start: 2024-07-25

## 2024-07-25 RX ORDER — ESOMEPRAZOLE MAGNESIUM 40 MG/1
40 CAPSULE, DELAYED RELEASE ORAL
Qty: 90 CAPSULE | Refills: 3 | Status: SHIPPED | OUTPATIENT
Start: 2024-07-25

## 2024-07-25 RX ORDER — TRAMADOL HYDROCHLORIDE 50 MG/1
50 TABLET ORAL EVERY 6 HOURS PRN
COMMUNITY

## 2024-07-25 RX ORDER — DIPHENOXYLATE HYDROCHLORIDE AND ATROPINE SULFATE 2.5; .025 MG/1; MG/1
TABLET ORAL
Qty: 1 TABLET | Refills: 0 | Status: SHIPPED | OUTPATIENT
Start: 2024-07-25

## 2024-07-25 RX ORDER — POTASSIUM CHLORIDE 750 MG/1
10 TABLET, EXTENDED RELEASE ORAL DAILY
Qty: 1 TABLET | Refills: 0 | Status: SHIPPED | OUTPATIENT
Start: 2024-07-25

## 2024-07-25 RX ORDER — BRIMONIDINE TARTRATE AND TIMOLOL MALEATE 2; 5 MG/ML; MG/ML
1 SOLUTION OPHTHALMIC
COMMUNITY

## 2024-07-25 NOTE — ASSESSMENT & PLAN NOTE
Patient's last oncology note said that she denied any fatigue, but the patient told me she stays very tired all the time.

## 2024-07-25 NOTE — PROGRESS NOTES
Subjective   The ABCs of the Annual Wellness Visit  Medicare Wellness Visit      Cristiane Harris is a 89 y.o. patient who presents for a Medicare Wellness Visit.    The following portions of the patient's history were reviewed and   updated as appropriate: allergies, current medications, past family history, past medical history, past social history, past surgical history, and problem list.    Compared to one year ago, the patient's physical   health is worse.  Compared to one year ago, the patient's mental   health is the same.    Recent Hospitalizations:  She was not admitted to the hospital during the last year.     Current Medical Providers:  Patient Care Team:  Thong Landin MD as PCP - General (Family Medicine)  Paulino Fleming MD as Surgeon (Orthopedic Surgery)  Matt Espinoza IV, MD as Cardiologist (Interventional Cardiology)    Outpatient Medications Prior to Visit   Medication Sig Dispense Refill    amLODIPine (NORVASC) 5 MG tablet Take  by mouth Daily.      aspirin 81 MG tablet Take 1 tablet by mouth Daily.      brimonidine-timolol (Combigan) 0.2-0.5 % ophthalmic solution 1 drop.      dorzolamide (TRUSOPT) 2 % ophthalmic solution Administer 1 drop to both eyes 2 (Two) Times a Day.      hydroCHLOROthiazide (HYDRODIURIL) 25 MG tablet Take 1 tablet by mouth Daily. 90 tablet 1    losartan (Cozaar) 100 MG tablet Take 1 tablet by mouth Daily. 90 tablet 1    Multiple Vitamins-Minerals (MULTIVITAMIN PO) Take 1 tablet by mouth Daily.      nitroglycerin (Nitrostat) 0.4 MG SL tablet Place one SL prn chest pain, may repeat q 5 minutes PRN up to 2 times, then call 911 25 tablet 2    pentosan polysulfate (ELMIRON) 100 MG capsule Take 1 capsule by mouth 2 (Two) Times a Day. 180 capsule 3    SUNItinib (SUTENT) 37.5 MG capsule chemo capsule Take 1 capsule by mouth Daily.      traMADol (ULTRAM) 50 MG tablet Take 1 tablet by mouth Every 6 (Six) Hours As Needed for Moderate Pain.      TRAVATAN Z 0.004 %  solution ophthalmic solution Administer 1 drop to both eyes Daily.      esomeprazole (nexIUM) 20 MG capsule Take 1 capsule by mouth Daily As Needed (acid reflux). 30 capsule 0    simvastatin (ZOCOR) 20 MG tablet Take 1 tablet by mouth Every Night. 90 tablet 3    imatinib (GLEEVEC) 100 MG chemo tablet Take 1 tablet by mouth Daily.       No facility-administered medications prior to visit.     Opioid medication/s are on active medication list.  and I have evaluated her active treatment plan and pain score trends (see table).  Vitals:    07/25/24 1434   PainSc: 0-No pain     I have reviewed the chart for potential of high risk medication and harmful drug interactions in the elderly.        Aspirin is on active medication list. Aspirin use is indicated based on review of current medical condition/s. Pros and cons of this therapy have been discussed today. Benefits of this medication outweigh potential harm.  Patient has been encouraged to continue taking this medication.  .      Patient Active Problem List   Diagnosis    Essential hypertension, benign    Atherosclerosis of both carotid arteries    Mixed hyperlipidemia    Encounter for long-term (current) use of other medications    Anemia    Nausea and vomiting    Clostridium difficile colitis    Coronary artery disease involving native coronary artery of native heart without angina pectoris    History of carotid endarterectomy    Hx of non-ST elevation myocardial infarction (NSTEMI)    H/O malignant gastrointestinal stromal tumor (GIST)    Lumbar spondylosis    Primary osteoarthritis involving multiple joints    Gastroesophageal reflux disease without esophagitis    Type 2 diabetes mellitus with stage 3b chronic kidney disease, without long-term current use of insulin    Fatigue    Acquired hypothyroidism    MYRON (generalized anxiety disorder)    Secondary malignancy of liver    Adjustment insomnia    Soft tissue tumor, malignant    Hypokalemia     Advance Care Planning  "(Click this link to access ACP Navigator)  Advance Directive is on file.  ACP discussion was held with the patient during this visit. Patient has an advance directive in EMR which is still valid.         Objective   Vitals:    24 1434   BP: 130/68   Pulse: 50   SpO2: 100%   Weight: 44.5 kg (98 lb)   Height: 160 cm (63\")   PainSc: 0-No pain       Estimated body mass index is 17.36 kg/m² as calculated from the following:    Height as of this encounter: 160 cm (63\").    Weight as of this encounter: 44.5 kg (98 lb).    BMI is below normal parameters (malnutrition). Recommendations: Patient is being treated for metastatic cancer and followed by oncology for this        Does the patient have evidence of cognitive impairment? No                                                                                                Health  Risk Assessment    Smoking Status:  Social History     Tobacco Use   Smoking Status Never    Passive exposure: Never   Smokeless Tobacco Never     Alcohol Consumption:  Social History     Substance and Sexual Activity   Alcohol Use No     Fall Risk Screen:  STEADI Fall Risk Assessment was completed, and patient is at LOW risk for falls.Assessment completed on:2024    Depression Screenin/25/2024     2:25 PM   PHQ-2/PHQ-9 Depression Screening   Little Interest or Pleasure in Doing Things 0-->not at all   Feeling Down, Depressed or Hopeless 0-->not at all   PHQ-9: Brief Depression Severity Measure Score 0     Health Habits and Functional and Cognitive Screenin/25/2024     2:25 PM   Functional & Cognitive Status   Do you have difficulty preparing food and eating? No   Do you have difficulty bathing yourself, getting dressed or grooming yourself? No   Do you have difficulty using the toilet? No   Do you have difficulty moving around from place to place? No   Do you have trouble with steps or getting out of a bed or a chair? No   Current Diet Well Balanced Diet   Dental Exam " Up to date   Eye Exam Up to date   Exercise (times per week) 3 times per week   Current Exercises Include Other;Home Fitness Gym   Do you need help using the phone?  No   Are you deaf or do you have serious difficulty hearing?  No   Do you need help to go to places out of walking distance? No   Do you need help shopping? No   Do you need help preparing meals?  No   Do you need help with housework?  No   Do you need help with laundry? No   Do you need help taking your medications? No   Do you need help managing money? No   Do you ever drive or ride in a car without wearing a seat belt? No   Have you felt unusual stress, anger or loneliness in the last month? No   Who do you live with? Alone   If you need help, do you have trouble finding someone available to you? No   Have you been bothered in the last four weeks by sexual problems? No   Do you have difficulty concentrating, remembering or making decisions? No             Age-appropriate Screening Schedule:  Refer to the list below for future screening recommendations based on patient's age, sex and/or medical conditions. Orders for these recommended tests are listed in the plan section. The patient has been provided with a written plan.    Health Maintenance List  Health Maintenance   Topic Date Due    BMI FOLLOWUP  Never done    DIABETIC EYE EXAM  Never done    HEMOGLOBIN A1C  12/13/2023    LIPID PANEL  06/13/2024    COVID-19 Vaccine (7 - 2023-24 season) 10/14/2024 (Originally 1/28/2024)    Pneumococcal Vaccine 65+ (1 of 2 - PCV) 07/25/2025 (Originally 11/13/1935)    INFLUENZA VACCINE  08/01/2024    DXA SCAN  07/11/2025    ANNUAL WELLNESS VISIT  07/25/2025    URINE MICROALBUMIN  07/25/2025    TDAP/TD VACCINES (2 - Td or Tdap) 10/13/2031    RSV Vaccine - Adults  Completed    ZOSTER VACCINE  Completed                                                                                                                                                CMS Preventative Services  Quick Reference  Risk Factors Identified During Encounter  None Identified    The above risks/problems have been discussed with the patient.  Pertinent information has been shared with the patient in the After Visit Summary.  An After Visit Summary and PPPS were made available to the patient.    Follow Up:   Next Medicare Wellness visit to be scheduled in 1 year.         Additional E&M Note during same encounter follows:  Patient has additional, significant, and separately identifiable condition(s)/problem(s) that require work above and beyond the Medicare Wellness Visit     Chief Complaint  Medicare Wellness-subsequent (physical) and Annual Exam    Subjective   HPI  Cristiane is also being seen today for an annual adult preventative physical exam.     Review of Systems   Constitutional:  Positive for activity change, appetite change and fatigue. Negative for chills, diaphoresis and fever.   HENT:  Negative for congestion, drooling, facial swelling, mouth sores, nosebleeds, sore throat, trouble swallowing and voice change.    Eyes:  Negative for visual disturbance.   Respiratory:  Negative for cough, choking, chest tightness, shortness of breath and wheezing.    Cardiovascular:  Negative for chest pain, palpitations and leg swelling.   Gastrointestinal:  Positive for abdominal pain and diarrhea. Negative for abdominal distention, blood in stool, constipation, nausea and vomiting.   Endocrine: Negative for polydipsia, polyphagia and polyuria.   Genitourinary:  Negative for decreased urine volume, dysuria and hematuria.   Musculoskeletal:  Negative for arthralgias, back pain, gait problem, joint swelling, myalgias and neck pain.   Skin:  Positive for wound. Negative for rash.   Allergic/Immunologic: Positive for environmental allergies.   Neurological:  Negative for tremors, seizures, syncope, facial asymmetry, speech difficulty, weakness, light-headedness and numbness.   Hematological:  Negative for adenopathy.    Psychiatric/Behavioral:  Negative for dysphoric mood, sleep disturbance and suicidal ideas. The patient is nervous/anxious.     Patient spends half the year living in [place], and she says she has been asking her doctors down there and her oncologist to fax me all of her results.  However, I explained to the patient that it is extremely difficult to keep up with someone remotely like this without knowing the details of each office visit.  Apparently the spot found on the liver which was 20 mm was determined to be metastatic GIST tumor, but she says that her recent CT scan showed that it was stable.  However, after the patient left I had some additional time to try review the records we had obtained, and her ALT was 69, whereas it has been normal over the past several years.  She does acknowledge that she feels weaker and tired all the time.  She also says that she has intermittent diarrhea, but she has been prescribed a pill for diarrhea but cannot recall the name, and that has helped.  In addition, she said that in [place] her potassium was very low so her doctor put her on a potassium pill but she did not know the dose she was on.  We had to contact her local pharmacy to get this information, and according to the medical assistant working with me today, the pill for diarrhea is Lomotil, 1 to 2 pills up to 4 times a day as needed.  The medical assistant said the patient had been placed on potassium chloride 10 mill equivalents once a day, but after I looked back to the records, I see that the oncology note actually says that she was given 20 mill equivalents twice a day, so it is not clear whether or not the patient has been taking the correct dose of potassium.    She does report having some recurrent epigastric pressure intermittently over the last couple of months, but it generally happens at night when she lies down, and not following meals.  She denies any chest pain, shortness of breath, palpitations,  "presyncope or syncope.  She has been taking her GERD medicine only as needed, and says that she only takes it occasionally, and I recommended that she go ahead and take the full dose every day to see if it helps with the symptoms.  If she develops any symptoms suggestive of cardiac issue then she should go to the emergency room right away or call 911.  She has gotten established with a new cardiologist and [facility], and has been to him recently and advised that everything appeared stable.    Her only other complaint is she has a skin lesion on her scalp that will not heal this been there for several months, but she has an appointment with dermatology in about 1 month.          Objective   Vital Signs:  /68   Pulse 50   Ht 160 cm (63\")   Wt 44.5 kg (98 lb)   SpO2 100%   BMI 17.36 kg/m²   Physical Exam  Constitutional:       General: She is not in acute distress.     Appearance: She is underweight. She is not ill-appearing, toxic-appearing or diaphoretic.   HENT:      Head: Normocephalic.      Right Ear: Ear canal and external ear normal.      Left Ear: Ear canal and external ear normal.      Nose: Nose normal. No rhinorrhea.      Mouth/Throat:      Mouth: Mucous membranes are moist.      Pharynx: Oropharynx is clear. No posterior oropharyngeal erythema.   Eyes:      General: No scleral icterus.     Extraocular Movements: Extraocular movements intact.      Conjunctiva/sclera: Conjunctivae normal.      Pupils: Pupils are equal, round, and reactive to light.   Neck:      Vascular: No carotid bruit.   Cardiovascular:      Rate and Rhythm: Normal rate and regular rhythm.      Pulses: Normal pulses.      Heart sounds: Normal heart sounds. No murmur heard.     No gallop.   Pulmonary:      Effort: Pulmonary effort is normal.      Breath sounds: Normal breath sounds. No wheezing, rhonchi or rales.   Chest:      Chest wall: No tenderness.   Abdominal:      General: Bowel sounds are normal. There is no distension. "      Palpations: Abdomen is soft. There is no mass.      Tenderness: There is no abdominal tenderness. There is no guarding or rebound.      Hernia: No hernia is present.   Musculoskeletal:         General: No swelling or deformity. Normal range of motion.      Cervical back: Normal range of motion. No rigidity.      Right lower leg: No edema.      Left lower leg: No edema.   Lymphadenopathy:      Cervical: No cervical adenopathy.   Skin:     General: Skin is warm and dry.      Capillary Refill: Capillary refill takes less than 2 seconds.      Coloration: Skin is not jaundiced.      Findings: Lesion (Lesion on the apex of scalp which looks like a 1.2 cm squamous cell carcinoma) present. No bruising, erythema or rash.   Neurological:      General: No focal deficit present.      Mental Status: She is alert and oriented to person, place, and time.      Cranial Nerves: No cranial nerve deficit.      Motor: No weakness.      Coordination: Coordination normal.      Gait: Gait normal.   Psychiatric:         Mood and Affect: Mood normal.         Behavior: Behavior normal.         Thought Content: Thought content normal.         Judgment: Judgment normal.                 Assessment and Plan               Medicare annual wellness visit, subsequent    Encounter for general adult medical examination with abnormal findings  Healthy lifestyle measures including healthy diet regular exercise and ideal weight and nutritional supplementation were discussed.  Preventive healthcare measures were also discussed.  I will ask the clinical assistant to call the patient back and determine whether or not she ever picked up the prescription for the potassium 20 mill equivalent twice a day that her oncologist sent to the pharmacy.  The patient indicated that she was prescribed a potassium supplement, but made no mention about anyone rechecking the potassium, and I explained that we need to determine if her potassium level is back in the  desirable range, along with checking the magnesium level, since low levels of these will make her continue to feel persistently weak.  Unfortunately, I did not see her recent labs until after she left the office, but she was not anemic when I checked her labs last year, and she was a little anemic with the labs and by the oncologist.  I had already ordered a B12 and folic acid but did not order iron studies.  Therefore, if the anemia persists, she should have iron testing as well.  Type 2 diabetes mellitus with stage 3b chronic kidney disease, without long-term current use of insulin   the diabetes was diagnosed when the patient had a GIST tumor and her A1c came into the prediabetes range after surgery for that with a partial removal of the pancreas.  However, we have continue to monitor her blood sugar and it has remained stable without any medical treatment.  Essential hypertension, benign    Mixed hyperlipidemia     Encounter for long-term (current) use of other medications    Coronary artery disease involving native coronary artery of native heart without angina pectoris    History of carotid endarterectomy    Lumbar spondylosis    Primary osteoarthritis involving multiple joints    Gastroesophageal reflux disease without esophagitis  Patient had been concerned about taking PPI therapy daily, and I explained to her that it is more important that she go ahead and take the medication daily, but if she continues to have episodes of epigastric discomfort, she will let me know.  While she has had diarrhea intermittently lately, she denies any melena  Acquired hypothyroidism    Other fatigue  Patient's last oncology note said that she denied any fatigue, but the patient told me she stays very tired all the time.  B12 deficiency    Vitamin D deficiency    Hyperlipidemia LDL goal <100     Diarrhea, unspecified type  She says this has been a chronic and recurrent problem, although never as bad as the time she had C.  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  However, if it starts feeling like it is infected or bleeding then she will return right away.    Orders Placed This Encounter   Procedures    Comprehensive Metabolic Panel     Order Specific Question:   Release to patient     Answer:   Routine Release [[id number]]    Hemoglobin A1c     Order Specific Question:   Release to patient     Answer:   Routine Release [[id number]]    Lipid Panel     Order Specific Question:   Release to patient     Answer:   Routine Release [[id number]]    Folate     Order Specific Question:   Release to patient     Answer:   Routine Release [[id number]]    Vitamin B12     Order Specific Question:   Release to patient     Answer:   Routine Release [[id number]]    Magnesium     Order Specific Question:   Release to patient     Answer:   Routine Release [[id number]]    TSH     Order Specific Question:   Release to patient     Answer:   Routine Release [[id number]]    Vitamin D,25-Hydroxy     Order Specific Question:   Release to patient     Answer:   Routine Release [[id number]]    POC Microalbumin     Order Specific Question:   Release to patient     Answer:   Routine Release [[id number]]    CBC & Differential     Order Specific Question:   Manual Differential     Answer:   No     Order Specific Question:   Release to patient     Answer:   Routine Release [[id number]]     New Medications Ordered This Visit   Medications    esomeprazole (nexIUM) 40 MG capsule     Sig: Take 1  capsule by mouth Every Morning Before Breakfast.     Dispense:  90 capsule     Refill:  3    simvastatin (ZOCOR) 20 MG tablet     Sig: Take 1 tablet by mouth Every Night.     Dispense:  90 tablet     Refill:  3    potassium chloride (KLOR-CON M10) 10 MEQ CR tablet     Sig: Take 1 tablet by mouth Daily.     Dispense:  1 tablet     Refill:  0     Not sent to pharmacy, just updating chart    diphenoxylate-atropine (Lomotil) 2.5-0.025 MG per tablet     Si-2 pills po qid prn diarrhea     Dispense:  1 tablet     Refill:  0     Not sent to pharmacy, just updating chart          Follow Up   Return in about 1 year (around 2025) for Annual physical, Nurse - AWV Subsequent.  Patient was given instructions and counseling regarding her condition or for health maintenance advice. Please see specific information pulled into the AVS if appropriate.

## 2024-07-25 NOTE — ASSESSMENT & PLAN NOTE
Patient had been concerned about taking PPI therapy daily, and I explained to her that it is more important that she go ahead and take the medication daily, but if she continues to have episodes of epigastric discomfort, she will let me know.  While she has had diarrhea intermittently lately, she denies any melena

## 2024-07-25 NOTE — ASSESSMENT & PLAN NOTE
the diabetes was diagnosed when the patient had a GIST tumor and her A1c came into the prediabetes range after surgery for that with a partial removal of the pancreas.  However, we have continue to monitor her blood sugar and it has remained stable without any medical treatment.

## 2024-07-25 NOTE — TELEPHONE ENCOUNTER
Catherine, I asked you to call [facility] today to find out what dose of potassium she is on, and to find out what medicine was prescribed for diarrhea. I noticed that you looked in the computer for a while and then told me potassium chloride 10meq once a day, but then actually called [facility] Dejes[facility] for the information on the diarrhea medicine (lomotil). I have since scoured patient's last oncology note (about 30 pages long) from 1 month ago and Dr Mahajan's note says that her potassium chloride was increased to 20mg twice a day. Thus, my concern is that you did not get the correct information from the pharmacy, and/or that the patient may not have picked up the adjusted prescription that was sent in June. Please clarify. I really need to know what dose she is actually TAKING, and how many times a day, so that if her potassium comes back abnormal again, I will know how to adjust it. Thanks

## 2024-07-26 DIAGNOSIS — Z79.899 ENCOUNTER FOR LONG-TERM (CURRENT) USE OF OTHER MEDICATIONS: ICD-10-CM

## 2024-07-26 DIAGNOSIS — E87.6 HYPOKALEMIA: ICD-10-CM

## 2024-07-26 DIAGNOSIS — E03.9 ACQUIRED HYPOTHYROIDISM: ICD-10-CM

## 2024-07-26 DIAGNOSIS — D53.9 DEFICIENCY ANEMIA: Primary | ICD-10-CM

## 2024-07-26 LAB
25(OH)D3+25(OH)D2 SERPL-MCNC: 27 NG/ML (ref 30–100)
ALBUMIN SERPL-MCNC: 4.2 G/DL (ref 3.6–4.6)
ALP SERPL-CCNC: 78 IU/L (ref 44–121)
ALT SERPL-CCNC: 46 IU/L (ref 0–32)
AST SERPL-CCNC: 50 IU/L (ref 0–40)
BASOPHILS # BLD AUTO: 0 X10E3/UL (ref 0–0.2)
BASOPHILS NFR BLD AUTO: 0 %
BILIRUB SERPL-MCNC: 0.3 MG/DL (ref 0–1.2)
BUN SERPL-MCNC: 38 MG/DL (ref 10–36)
BUN/CREAT SERPL: 28 (ref 12–28)
CALCIUM SERPL-MCNC: 9 MG/DL (ref 8.7–10.3)
CHLORIDE SERPL-SCNC: 114 MMOL/L (ref 96–106)
CHOLEST SERPL-MCNC: 126 MG/DL (ref 100–199)
CO2 SERPL-SCNC: 18 MMOL/L (ref 20–29)
CREAT SERPL-MCNC: 1.37 MG/DL (ref 0.57–1)
EGFRCR SERPLBLD CKD-EPI 2021: 36 ML/MIN/1.73
EOSINOPHIL # BLD AUTO: 0.1 X10E3/UL (ref 0–0.4)
EOSINOPHIL NFR BLD AUTO: 1 %
ERYTHROCYTE [DISTWIDTH] IN BLOOD BY AUTOMATED COUNT: 11.8 % (ref 11.7–15.4)
FOLATE SERPL-MCNC: >20 NG/ML
GLOBULIN SER CALC-MCNC: 2.4 G/DL (ref 1.5–4.5)
GLUCOSE SERPL-MCNC: 90 MG/DL (ref 70–99)
HBA1C MFR BLD: 5.5 % (ref 4.8–5.6)
HCT VFR BLD AUTO: 31.6 % (ref 34–46.6)
HDLC SERPL-MCNC: 47 MG/DL
HGB BLD-MCNC: 10.3 G/DL (ref 11.1–15.9)
IMM GRANULOCYTES # BLD AUTO: 0 X10E3/UL (ref 0–0.1)
IMM GRANULOCYTES NFR BLD AUTO: 0 %
LDLC SERPL CALC-MCNC: 50 MG/DL (ref 0–99)
LYMPHOCYTES # BLD AUTO: 2.2 X10E3/UL (ref 0.7–3.1)
LYMPHOCYTES NFR BLD AUTO: 42 %
MAGNESIUM SERPL-MCNC: 2 MG/DL (ref 1.6–2.3)
MCH RBC QN AUTO: 37.1 PG (ref 26.6–33)
MCHC RBC AUTO-ENTMCNC: 32.6 G/DL (ref 31.5–35.7)
MCV RBC AUTO: 114 FL (ref 79–97)
MONOCYTES # BLD AUTO: 0.4 X10E3/UL (ref 0.1–0.9)
MONOCYTES NFR BLD AUTO: 8 %
NEUTROPHILS # BLD AUTO: 2.6 X10E3/UL (ref 1.4–7)
NEUTROPHILS NFR BLD AUTO: 49 %
PLATELET # BLD AUTO: 211 X10E3/UL (ref 150–450)
POTASSIUM SERPL-SCNC: 4 MMOL/L (ref 3.5–5.2)
PROT SERPL-MCNC: 6.6 G/DL (ref 6–8.5)
RBC # BLD AUTO: 2.78 X10E6/UL (ref 3.77–5.28)
SODIUM SERPL-SCNC: 144 MMOL/L (ref 134–144)
TRIGL SERPL-MCNC: 179 MG/DL (ref 0–149)
TSH SERPL DL<=0.005 MIU/L-ACNC: 7.5 UIU/ML (ref 0.45–4.5)
VIT B12 SERPL-MCNC: 696 PG/ML (ref 232–1245)
VLDLC SERPL CALC-MCNC: 29 MG/DL (ref 5–40)
WBC # BLD AUTO: 5.3 X10E3/UL (ref 3.4–10.8)

## 2024-07-26 RX ORDER — HYDROCHLOROTHIAZIDE 12.5 MG/1
12.5 TABLET ORAL DAILY
Qty: 90 TABLET | Refills: 1 | Status: SHIPPED | OUTPATIENT
Start: 2024-07-26

## 2024-07-29 RX ORDER — PENTOSAN POLYSULFATE SODIUM 100 MG/1
100 CAPSULE, GELATIN COATED ORAL 2 TIMES DAILY
Qty: 180 CAPSULE | Refills: 3 | OUTPATIENT
Start: 2024-07-29

## 2024-08-19 ENCOUNTER — LAB (OUTPATIENT)
Dept: FAMILY MEDICINE CLINIC | Facility: CLINIC | Age: 89
End: 2024-08-19
Payer: MEDICARE

## 2024-08-30 ENCOUNTER — TELEPHONE (OUTPATIENT)
Dept: FAMILY MEDICINE CLINIC | Facility: CLINIC | Age: 89
End: 2024-08-30

## 2024-08-30 NOTE — TELEPHONE ENCOUNTER
Caller: Cristiane Harris    Relationship: Self    Best call back number: [phone]    What is the medical concern/diagnosis:     FELT A LUMP IN RIGHT BREAST    What specialty or service is being requested:     MAMMOGRAM    What is the provider, practice or medical service name:     [facility]    Any additional details:     SHE CANNOT DO IT ON SEPT 5.  SHE HAS ANOTHER APPOINTMENT AND 9/20 AT 1:10 PM    PLEASE CALL PATIENT WITH APPOINTMENT DETAILS

## 2024-09-06 ENCOUNTER — OFFICE VISIT (OUTPATIENT)
Dept: FAMILY MEDICINE CLINIC | Facility: CLINIC | Age: 89
End: 2024-09-06
Payer: MEDICARE

## 2024-09-06 VITALS
OXYGEN SATURATION: 99 % | WEIGHT: 102.7 LBS | SYSTOLIC BLOOD PRESSURE: 126 MMHG | TEMPERATURE: 97.6 F | DIASTOLIC BLOOD PRESSURE: 64 MMHG | HEIGHT: 63 IN | HEART RATE: 47 BPM | BODY MASS INDEX: 18.2 KG/M2

## 2024-09-06 DIAGNOSIS — N63.32 MASS OF AXILLARY TAIL OF LEFT BREAST: ICD-10-CM

## 2024-09-06 DIAGNOSIS — N63.10 MASS OF RIGHT BREAST, UNSPECIFIED QUADRANT: Primary | ICD-10-CM

## 2024-09-06 PROCEDURE — 99213 OFFICE O/P EST LOW 20 MIN: CPT | Performed by: FAMILY MEDICINE

## 2024-09-06 PROCEDURE — 90677 PCV20 VACCINE IM: CPT | Performed by: FAMILY MEDICINE

## 2024-09-06 PROCEDURE — G0009 ADMIN PNEUMOCOCCAL VACCINE: HCPCS | Performed by: FAMILY MEDICINE

## 2024-09-06 PROCEDURE — 1126F AMNT PAIN NOTED NONE PRSNT: CPT | Performed by: FAMILY MEDICINE

## 2024-09-07 NOTE — PROGRESS NOTES
"Chief Complaint  Lump in breast  (Rt breast lump in June 2024 noticed in CT  scan done by oncology Dr. Mahajan pt states Rt sided diagnostic was ordered but not done states was unable to do /Here today to get mammogram ordered pt states rt but Ct said left ) and Hair/Scalp Problem (Dr. Barbara Menjivar saw last week  return appt Sep 26,2024 /Dr. Menjivar done body scan last week and froze places on face and left arm )    Subjective      Cristiane Harris presents to [facility]  History of Present Illness  Patient says that her oncologist ordered a diagnostic mammogram on her because there was an \"irregular\" area seen in the left axilla on a CT scan.  However, the test was scheduled at the hospital in [place] and they were given the run around because they would not do the test without the test being ordered as a bilateral diagnostic mammogram.  She has a lot of trouble reaching her oncologist on the telephone so she decided to come and asked me if I would schedule this.  She denies any pain or palpable lumps in the left breast, but she says she did think she felt a pea-sized lump in the right breast over the past week, though it seems like it may have gone away and she does have a history of fibrocystic disease.  She denies any skin changes, nipple discharge or retraction, etc.    The patient also look back through her vaccine records and she has not received the Prevnar 20 and would like to do so.  Objective   Vital Signs:   Vitals:    09/06/24 1118   BP: 126/64   BP Location: Left arm   Patient Position: Sitting   Cuff Size: Adult   Pulse: (!) 47   Temp: 97.6 °F (36.4 °C)   TempSrc: Oral   SpO2: 99%   Weight: 46.6 kg (102 lb 11.2 oz)   Height: 160 cm (63\")      /64 (BP Location: Left arm, Patient Position: Sitting, Cuff Size: Adult)   Pulse (!) 47   Temp 97.6 °F (36.4 °C) (Oral)   Ht 160 cm (63\")   Wt 46.6 kg (102 lb 11.2 oz)   SpO2 99%   BMI 18.19 kg/m²     Body mass index is " 18.19 kg/m².    Review of Systems   Constitutional:  Negative for chills and fever.   HENT:  Negative for swollen glands.        Past History:  Medical History: has a past medical history of Anemia, Arthritis, GERD (gastroesophageal reflux disease), GIST (gastrointestinal stromal tumor), non-malignant, Hyperlipemia, Hypertension, Metastatic disease (2023), Skin cancer, and Stenosis of carotid artery.   Surgical History: has a past surgical history that includes Thromboendarterectomy (Right, 11/20/2012); Other surgical history; Carotid Endarterectomy (Left); Breast surgery (2011); Tumor removal; Whipple Procedure; Cataract extraction w/ intraocular lens implant; Joint replacement; Total knee arthroplasty (Right); and Esophagogastroduodenoscopy (N/A, 7/3/2018).   Family History: family history includes Aneurysm in her father; Cancer in her mother and sister; Coronary artery disease in her father; Diabetes in her father.   Social History: reports that she has never smoked. She has never been exposed to tobacco smoke. She has never used smokeless tobacco. She reports that she does not drink alcohol and does not use drugs.      Current Outpatient Medications:     amLODIPine (NORVASC) 5 MG tablet, Take  by mouth Daily., Disp: , Rfl:     aspirin 81 MG tablet, Take 1 tablet by mouth Daily., Disp: , Rfl:     diphenoxylate-atropine (Lomotil) 2.5-0.025 MG per tablet, 1-2 pills po qid prn diarrhea, Disp: 1 tablet, Rfl: 0    dorzolamide (TRUSOPT) 2 % ophthalmic solution, Administer 1 drop to both eyes 2 (Two) Times a Day., Disp: , Rfl:     esomeprazole (nexIUM) 40 MG capsule, Take 1 capsule by mouth Every Morning Before Breakfast., Disp: 90 capsule, Rfl: 3    hydroCHLOROthiazide 12.5 MG tablet, Take 1 tablet by mouth Daily., Disp: 90 tablet, Rfl: 1    losartan (Cozaar) 100 MG tablet, Take 1 tablet by mouth Daily., Disp: 90 tablet, Rfl: 1    Multiple Vitamins-Minerals (MULTIVITAMIN PO), Take 1 tablet by mouth Daily., Disp: , Rfl:      nitroglycerin (Nitrostat) 0.4 MG SL tablet, Place one SL prn chest pain, may repeat q 5 minutes PRN up to 2 times, then call 911, Disp: 25 tablet, Rfl: 2    pentosan polysulfate (ELMIRON) 100 MG capsule, Take 1 capsule by mouth 2 (Two) Times a Day., Disp: 180 capsule, Rfl: 3    potassium chloride (KLOR-CON M10) 10 MEQ CR tablet, Take 1 tablet by mouth Daily., Disp: 1 tablet, Rfl: 0    simvastatin (ZOCOR) 20 MG tablet, Take 1 tablet by mouth Every Night., Disp: 90 tablet, Rfl: 3    SUNItinib (SUTENT) 37.5 MG capsule chemo capsule, Take 1 capsule by mouth Daily., Disp: , Rfl:     traMADol (ULTRAM) 50 MG tablet, Take 1 tablet by mouth Every 6 (Six) Hours As Needed for Moderate Pain., Disp: , Rfl:     brimonidine-timolol (Combigan) 0.2-0.5 % ophthalmic solution, 1 drop. (Patient not taking: Reported on 9/6/2024), Disp: , Rfl:     TRAVATAN Z 0.004 % solution ophthalmic solution, Administer 1 drop to both eyes Daily. (Patient not taking: Reported on 9/6/2024), Disp: , Rfl:     Allergies: Acetaminophen    Physical Exam  Constitutional:       General: She is not in acute distress.     Appearance: She is not toxic-appearing.   HENT:      Head: Normocephalic.      Right Ear: External ear normal.      Left Ear: External ear normal.      Nose: Nose normal.      Mouth/Throat:      Mouth: Mucous membranes are moist.      Pharynx: Oropharynx is clear.   Eyes:      Extraocular Movements: Extraocular movements intact.      Conjunctiva/sclera: Conjunctivae normal.      Pupils: Pupils are equal, round, and reactive to light.   Musculoskeletal:      Cervical back: Normal range of motion.   Neurological:      General: No focal deficit present.      Mental Status: She is alert and oriented to person, place, and time.      Cranial Nerves: No cranial nerve deficit.   Psychiatric:         Mood and Affect: Mood normal.         Behavior: Behavior normal.         Thought Content: Thought content normal.         Judgment: Judgment normal.                    Assessment and Plan   Diagnoses and all orders for this visit:    1. Mass of right breast, unspecified quadrant (Primary)  -     Mammo Diagnostic Digital Tomosynthesis Bilateral With CAD; Future  -     US Breast Bilateral Complete; Future  We will schedule bilateral diagnostic mammogram with ultrasounds if needed at her request.  2. Mass of axillary tail of left breast  -     Mammo Diagnostic Digital Tomosynthesis Bilateral With CAD; Future  -     US Breast Bilateral Complete; Future    Other orders  -     Pneumococcal Conjugate Vaccine 20-Valent (PCV20)            Follow Up   No follow-ups on file.  Patient was given instructions and counseling regarding her condition or for health maintenance advice. Please see specific information pulled into the AVS if appropriate.     Thong Landin MD

## 2024-10-31 DIAGNOSIS — N63.10 MASS OF RIGHT BREAST, UNSPECIFIED QUADRANT: ICD-10-CM

## 2024-10-31 DIAGNOSIS — N63.32 MASS OF AXILLARY TAIL OF LEFT BREAST: ICD-10-CM

## 2025-03-06 ENCOUNTER — TELEPHONE (OUTPATIENT)
Dept: FAMILY MEDICINE CLINIC | Facility: CLINIC | Age: OVER 89
End: 2025-03-06

## 2025-03-06 NOTE — TELEPHONE ENCOUNTER
Caller: Cristiane Harris    Relationship: Self    Best call back number: [phone]    What is the best time to reach you: ANYTIME     Who are you requesting to speak with (clinical staff, provider,  specific staff member): CLINICAL STAFF     PATIENT IS IN [facility] AND WANTING TO SPEAK WITH STAFF SINCE SHE CAN'T DO A TELEHEALTH. PATIENT IS WANTING A REFILL OF HER MEDICATION (WATER PILL). PATIENT DOESN'T KNOW WHO PUT HER ON THE MEDICATION. PLEASE CALL TO DISCUSS.

## 2025-03-06 NOTE — TELEPHONE ENCOUNTER
Caller: Cristiane Harris     Relationship: Self     Best call back number: [phone]     What is the best time to reach you: ANYTIME      Who are you requesting to speak with (clinical staff, provider,  specific staff member): CLINICAL STAFF      PATIENT IS IN [facility] AND WANTING TO SPEAK WITH STAFF SINCE SHE CAN'T DO A TELEHEALTH. PATIENT IS WANTING A REFILL OF HER MEDICATION (WATER PILL). PATIENT DOESN'T KNOW WHO PUT HER ON THE MEDICATION. PLEASE CALL TO DISCUSS.       Dr. Landin please advise

## 2025-03-11 NOTE — TELEPHONE ENCOUNTER
Caller: Cristiane Harris     Relationship: Self     Best call back number: [phone]     What is the best time to reach you: ANYTIME      Who are you requesting to speak with (clinical staff, provider,  specific staff member): CLINICAL STAFF      PATIENT IS IN [place] AND WANTING TO SPEAK WITH STAFF SINCE SHE CAN'T DO A TELEHEALTH. PATIENT IS WANTING A REFILL OF HER MEDICATION (WATER PILL). PATIENT DOESN'T KNOW WHO PUT HER ON THE MEDICATION. PLEASE CALL TO DISCUSS.     Dr. Landin I called Mrs. Harris she stated she had been on Triamtrene/Hctz 37.5 -25 one po daily written by Dr. Wilks due to low K  I advised that I did not have that in updated med list I did not see on med list at all I advised her to call Dr. Wilks and have her refill this   Medication since she was the one who wrote it  and montiored the labs. She stated that Dr. Wilks d/c ed this medication due to labs returning to normal but she wanted to stay on this medication  Pt wanted your advice on this matter Thank you

## 2025-03-18 NOTE — TELEPHONE ENCOUNTER
I cannot advise on this, do not have enough information since she has not been seen here since last year and I don't have any notes or labs or data from the doctor in [place]. If Dr Wilks is a PCP, she will have to follow his or her advice since he or she has seen her more recently and has labs and blood pressure readings on her. If Dr Wilks is not a PCP, then the patient is going to have to get established with a PCP in [place] to see when she is staying there, since she is there for so many months each year and her medicines seem to be requiring changes more frequently than she is able to come in to our office. Her next appointment with me is not until June.     Left message 03/18/2025

## 2025-03-19 NOTE — TELEPHONE ENCOUNTER
I  cannot advise on this, do not have enough information since she has not been seen here since last year and I don't have any notes or labs or data from the doctor in [place]. If Dr Wilks is a PCP, she will have to follow his or her advice since he or she has seen her more recently and has labs and blood pressure readings on her. If Dr Wilks is not a PCP, then the patient is going to have to get established with a PCP in [place] to see when she is staying there, since she is there for so many months each year and her medicines seem to be requiring changes more frequently than she is able to come in to our office. Her next appointment with me is not until June.        was unable to get ahold of Mrs. Harris spoke with her son Dennis on 03/19/2025 at 08:50 am he stated  had a telehealth   Appt with Dr. Wilks who is with [facility] today those records should be available for you to review this afternoon or tomorrow  ANTWON

## 2025-06-06 ENCOUNTER — TELEPHONE (OUTPATIENT)
Dept: FAMILY MEDICINE CLINIC | Facility: CLINIC | Age: OVER 89
End: 2025-06-06

## 2025-06-06 ENCOUNTER — OFFICE VISIT (OUTPATIENT)
Dept: FAMILY MEDICINE CLINIC | Facility: CLINIC | Age: OVER 89
End: 2025-06-06
Payer: MEDICARE

## 2025-06-06 VITALS
SYSTOLIC BLOOD PRESSURE: 120 MMHG | BODY MASS INDEX: 17.9 KG/M2 | DIASTOLIC BLOOD PRESSURE: 72 MMHG | HEIGHT: 62 IN | WEIGHT: 97.3 LBS

## 2025-06-06 DIAGNOSIS — N17.9 ACUTE KIDNEY INJURY: ICD-10-CM

## 2025-06-06 DIAGNOSIS — M15.0 PRIMARY OSTEOARTHRITIS INVOLVING MULTIPLE JOINTS: ICD-10-CM

## 2025-06-06 DIAGNOSIS — D64.9 ANEMIA, UNSPECIFIED TYPE: Chronic | ICD-10-CM

## 2025-06-06 DIAGNOSIS — I65.23 ATHEROSCLEROSIS OF BOTH CAROTID ARTERIES: ICD-10-CM

## 2025-06-06 DIAGNOSIS — M47.816 LUMBAR SPONDYLOSIS: ICD-10-CM

## 2025-06-06 DIAGNOSIS — I10 ESSENTIAL HYPERTENSION, BENIGN: Primary | ICD-10-CM

## 2025-06-06 DIAGNOSIS — E78.2 MIXED HYPERLIPIDEMIA: ICD-10-CM

## 2025-06-06 DIAGNOSIS — N30.00 ACUTE CYSTITIS WITHOUT HEMATURIA: ICD-10-CM

## 2025-06-06 DIAGNOSIS — I25.10 CORONARY ARTERY DISEASE INVOLVING NATIVE CORONARY ARTERY OF NATIVE HEART WITHOUT ANGINA PECTORIS: ICD-10-CM

## 2025-06-06 DIAGNOSIS — E03.9 ACQUIRED HYPOTHYROIDISM: ICD-10-CM

## 2025-06-06 DIAGNOSIS — E11.22 TYPE 2 DIABETES MELLITUS WITH STAGE 3B CHRONIC KIDNEY DISEASE, WITHOUT LONG-TERM CURRENT USE OF INSULIN: ICD-10-CM

## 2025-06-06 DIAGNOSIS — C78.7 SECONDARY MALIGNANCY OF LIVER: ICD-10-CM

## 2025-06-06 DIAGNOSIS — E55.9 VITAMIN D DEFICIENCY: ICD-10-CM

## 2025-06-06 DIAGNOSIS — E87.6 HYPOKALEMIA: ICD-10-CM

## 2025-06-06 DIAGNOSIS — N18.32 TYPE 2 DIABETES MELLITUS WITH STAGE 3B CHRONIC KIDNEY DISEASE, WITHOUT LONG-TERM CURRENT USE OF INSULIN: ICD-10-CM

## 2025-06-06 DIAGNOSIS — R53.83 OTHER FATIGUE: ICD-10-CM

## 2025-06-06 DIAGNOSIS — Z79.899 ENCOUNTER FOR LONG-TERM (CURRENT) USE OF MEDICATIONS: ICD-10-CM

## 2025-06-06 DIAGNOSIS — K21.9 GASTROESOPHAGEAL REFLUX DISEASE WITHOUT ESOPHAGITIS: ICD-10-CM

## 2025-06-06 RX ORDER — ESOMEPRAZOLE MAGNESIUM 40 MG/1
40 CAPSULE, DELAYED RELEASE ORAL
Qty: 90 CAPSULE | Refills: 3 | Status: SHIPPED | OUTPATIENT
Start: 2025-06-06

## 2025-06-06 RX ORDER — SIMVASTATIN 20 MG
20 TABLET ORAL NIGHTLY
Qty: 90 TABLET | Refills: 3 | Status: SHIPPED | OUTPATIENT
Start: 2025-06-06

## 2025-06-06 RX ORDER — AMLODIPINE BESYLATE 10 MG/1
10 TABLET ORAL DAILY
COMMUNITY
End: 2025-06-06 | Stop reason: SDUPTHER

## 2025-06-06 RX ORDER — AMLODIPINE BESYLATE 10 MG/1
10 TABLET ORAL DAILY
Qty: 90 TABLET | Refills: 3 | Status: SHIPPED | OUTPATIENT
Start: 2025-06-06

## 2025-06-06 RX ORDER — LOSARTAN POTASSIUM 100 MG/1
100 TABLET ORAL DAILY
Qty: 90 TABLET | Refills: 0 | Status: SHIPPED | OUTPATIENT
Start: 2025-06-06

## 2025-06-12 NOTE — PROGRESS NOTES
Chief Complaint  Hypertension and Discuss medication     Subjective      Cristiane Harris presents to [facility]  Hypertension  Associated symptoms: no blurred vision, no chest pain, no neck pain, no palpitations, no shortness of breath and no dizziness      Is here to discuss her hypertension management, along with other medications.  She brought in some labs that she just had been in the past couple of days by her oncologist, but there was not a complete report, and she said she had just had a metabolic panel done as well.  The patient's renal function has dropped substantially, as her GFR was 43 about a year ago, but on the test in June 4, 2025 the GFR had dropped to 25.  Her potassium was down to 2.9, so the oncologist increased her potassium dose, but recommended that she talk with me about her blood pressure management.  Once I was made aware of the drop in the GFR I told her that she absolutely should stop the hydrochlorothiazide 12.5 mg, and I do not know if that alone will be adequate, but she pointed out that she had been somewhat ill recently with poor oral intake and then underwent a scan, and so had not eaten or drank much for several days when the labs were done.  She is not having any symptoms of heart failure or pathologic edema, so we agreed that she would come back in 2 weeks for repeat lab work.  Her p.o. intake has improved in the last 24 to 48 hours.    The patient's CT scan has shown a stable hypodense 10 mm lesion in the liver for a while, and there is some consideration given to having radiation to that spot in the liver.  However, the patient has recently had some epigastric pain so she is scheduled for an EGD.  She denies any blood in the stool or melena, has not had any vomiting or significant dysphagia, but I did point out that her BUN was elevated as well and that can be from upper GI bleeding as well as from renal failure.  Her anemia has been fairly  "stable, with hemoglobin running 9.6 in March of this year, and 9.3 more recently.  However, that is a drop from last year.    The patient said she just had an eye exam with Dr. Garrido.  She does have some macular degeneration.    The patient wishes to continue having mammography, in spite of her advanced age.  We discussed the fact that routine mammography is not necessarily recommended past the age of 75, and since her mammogram last year was okay, it is very unlikely that doing a mammogram will improve her survival benefit, even if something abnormal is detected and treated.  Nonetheless, she is quite adamant she wants to have the mammogram done, and is willing to accept the risk and benefit ratio.  The patient does not wish to have any more bone density tests.  She did have a carotid Doppler that showed a 50 to 69% obstruction on the left in 2023, and she would like to have a follow-up carotid Doppler.  She has not had any TIA or CVA symptoms.    The patient does have a somewhat new complaint and that she has occasional pink papules that developed, usually no more than 3 to 4 mm in size, mostly on her upper back or chest, initially pruritic and then get somewhat sore and then they tend to resolve and go away.  There have never been any pustules or vesicles, and these usually happen as an isolated spot, or maybe 2, but they have been recurring for the last 2 to 3 months.  She denies any new environmental exposures, and does not seem to get lesions on her arms or legs or face.  Objective   Vital Signs:   Vitals:    06/06/25 1303   BP: 120/72   BP Location: Left arm   Patient Position: Sitting   Cuff Size: Adult   Weight: 44.1 kg (97 lb 4.8 oz)   Height: 157.5 cm (62\")      /72 (BP Location: Left arm, Patient Position: Sitting, Cuff Size: Adult)   Ht 157.5 cm (62\")   Wt 44.1 kg (97 lb 4.8 oz)   BMI 17.80 kg/m²     Body mass index is 17.8 kg/m².    Review of Systems   Constitutional:  Negative for chills, " fever and unexpected weight loss.   HENT:  Negative for congestion, ear discharge, ear pain, facial swelling, mouth sores, nosebleeds, rhinorrhea, sinus pressure, sore throat, swollen glands and trouble swallowing.    Eyes:  Negative for blurred vision, double vision, pain, redness and visual disturbance.   Respiratory:  Negative for cough, choking, chest tightness, shortness of breath and wheezing.    Cardiovascular:  Negative for chest pain, palpitations and leg swelling.        PND, orthopnea   Gastrointestinal:  Positive for abdominal pain. Negative for abdominal distention, blood in stool, constipation, diarrhea, nausea, vomiting and GERD.        Dysphagia, odynophagia   Endocrine: Negative for polydipsia, polyphagia and polyuria.   Genitourinary:  Negative for breast discharge, breast lump, breast pain, decreased urine volume, difficulty urinating, dysuria, frequency, hematuria, pelvic pressure, urgency and urinary incontinence.   Musculoskeletal:  Positive for back pain (Chronic, intermittent, unchanged). Negative for arthralgias (unusual/atypica), gait problem, joint swelling, myalgias and neck pain.   Skin:  Positive for skin lesions (worrisome/suspicious). Negative for rash and bruise.   Allergic/Immunologic: Negative for food allergies.   Neurological:  Negative for dizziness, tremors, seizures, syncope, weakness, light-headedness, numbness, headache and memory problem.   Hematological:  Negative for adenopathy. Does not bruise/bleed easily.   Psychiatric/Behavioral:  Negative for suicidal ideas and depressed mood. The patient is not nervous/anxious.        Past History:  Medical History: has a past medical history of Anemia, Arthritis, GERD (gastroesophageal reflux disease), GIST (gastrointestinal stromal tumor), non-malignant, Hyperlipemia, Hypertension, Metastatic disease (2023), Skin cancer, and Stenosis of carotid artery.   Surgical History: has a past surgical history that includes  Thromboendarterectomy (Right, 11/20/2012); Other surgical history; Carotid Endarterectomy (Left); Breast surgery (2011); Tumor removal; Whipple Procedure; Cataract extraction w/ intraocular lens implant; Joint replacement; Total knee arthroplasty (Right); and Esophagogastroduodenoscopy (N/A, 7/3/2018).   Family History: family history includes Aneurysm in her father; Cancer in her mother and sister; Coronary artery disease in her father; Diabetes in her father.   Social History: reports that she has never smoked. She has never been exposed to tobacco smoke. She has never used smokeless tobacco. She reports that she does not drink alcohol and does not use drugs.      Current Outpatient Medications:     amLODIPine (NORVASC) 10 MG tablet, Take 1 tablet by mouth Daily., Disp: 90 tablet, Rfl: 3    aspirin 81 MG tablet, Take 1 tablet by mouth Daily., Disp: , Rfl:     diphenoxylate-atropine (Lomotil) 2.5-0.025 MG per tablet, 1-2 pills po qid prn diarrhea, Disp: 1 tablet, Rfl: 0    dorzolamide (TRUSOPT) 2 % ophthalmic solution, Administer 1 drop to both eyes 2 (Two) Times a Day., Disp: , Rfl:     esomeprazole (nexIUM) 40 MG capsule, Take 1 capsule by mouth Every Morning Before Breakfast., Disp: 90 capsule, Rfl: 3    losartan (Cozaar) 100 MG tablet, Take 1 tablet by mouth Daily., Disp: 90 tablet, Rfl: 0    Multiple Vitamins-Minerals (MULTIVITAMIN PO), Take 1 tablet by mouth Daily., Disp: , Rfl:     nitroglycerin (Nitrostat) 0.4 MG SL tablet, Place one SL prn chest pain, may repeat q 5 minutes PRN up to 2 times, then call 911, Disp: 25 tablet, Rfl: 2    potassium chloride (KLOR-CON M10) 10 MEQ CR tablet, Take 1 tablet by mouth Daily. (Patient taking differently: Take 1 tablet by mouth Daily. 2 tablets once daily), Disp: 1 tablet, Rfl: 0    simvastatin (ZOCOR) 20 MG tablet, Take 1 tablet by mouth Every Night., Disp: 90 tablet, Rfl: 3    traMADol (ULTRAM) 50 MG tablet, Take 1 tablet by mouth Every 6 (Six) Hours As Needed for  Moderate Pain., Disp: , Rfl:     Allergies: Acetaminophen    Physical Exam  Constitutional:       General: She is not in acute distress.     Appearance: She is not ill-appearing, toxic-appearing or diaphoretic.   HENT:      Head: Normocephalic and atraumatic.      Right Ear: Ear canal and external ear normal.      Left Ear: Ear canal and external ear normal.      Nose: Nose normal.      Mouth/Throat:      Mouth: Mucous membranes are moist.      Pharynx: Oropharynx is clear.   Eyes:      General: No scleral icterus.     Extraocular Movements: Extraocular movements intact.      Conjunctiva/sclera: Conjunctivae normal.      Pupils: Pupils are equal, round, and reactive to light.   Neck:      Vascular: No carotid bruit.   Cardiovascular:      Rate and Rhythm: Normal rate and regular rhythm.      Pulses: Normal pulses.           Dorsalis pedis pulses are 2+ on the right side and 2+ on the left side.        Posterior tibial pulses are 2+ on the right side and 2+ on the left side.      Heart sounds: Normal heart sounds. No murmur heard.     No gallop.   Pulmonary:      Effort: Pulmonary effort is normal.      Breath sounds: Normal breath sounds. No wheezing, rhonchi or rales.   Chest:      Chest wall: No tenderness.   Abdominal:      General: Bowel sounds are normal. There is no distension.      Palpations: Abdomen is soft. There is no mass.      Tenderness: There is no abdominal tenderness. There is no guarding or rebound.   Musculoskeletal:         General: No swelling or deformity. Normal range of motion.      Cervical back: Normal range of motion. No rigidity.      Right lower leg: No edema.      Left lower leg: No edema.      Right foot: No deformity.      Left foot: No deformity.   Feet:      Right foot:      Protective Sensation: 5 sites tested.  5 sites sensed.      Skin integrity: Skin integrity normal.      Left foot:      Protective Sensation: 5 sites tested.  5 sites sensed.      Skin integrity: Skin integrity  normal.      Comments:      Lymphadenopathy:      Cervical: No cervical adenopathy.   Skin:     General: Skin is warm and dry.      Capillary Refill: Capillary refill takes less than 2 seconds.      Coloration: Skin is pale. Skin is not jaundiced.      Findings: Lesion (3 to 4 mm pink papule on the right upper back, no signs of infection) present. No bruising or erythema.   Neurological:      General: No focal deficit present.      Mental Status: She is alert and oriented to person, place, and time.      Cranial Nerves: No cranial nerve deficit, dysarthria or facial asymmetry.      Motor: No weakness, tremor or atrophy.      Coordination: Coordination normal.      Gait: Gait normal.   Psychiatric:         Attention and Perception: Attention and perception normal.         Mood and Affect: Mood and affect normal.         Speech: Speech normal.         Behavior: Behavior normal.         Thought Content: Thought content normal.         Cognition and Memory: Cognition and memory normal.         Judgment: Judgment normal.                   Assessment and Plan   Diagnoses and all orders for this visit:    1. Essential hypertension, benign (Primary)  Stop hydrochlorothiazide, monitor blood pressure, return in 2 weeks for BMP and magnesium, and if her renal function has not improved significantly we will cut back on her losartan as well and consider nephrology referral.  I did explain to the patient and her daughter that we usually refer to nephrology when the GFR is around 30, but her level was 43 at the end of last year, and in the upper 30s earlier this year, so we are hopefully will come back up.  2. Encounter for long-term (current) use of medications  -     CBC & Differential; Future  -     Comprehensive Metabolic Panel; Future  -     Magnesium; Future    3. Secondary malignancy of liver  Followed by oncology  4. Acquired hypothyroidism  -     TSH+Free T4; Future    5. Type 2 diabetes mellitus with stage 3b chronic  kidney disease, without long-term current use of insulin  -     Hemoglobin A1c; Future  -     POC Albumin/Creatinine Ratio Urine; Future  Her baseline is stage 3b, but recent test would put her in stage IV if it persists.  She has not required any treatment for diabetes and her A1c has remained excellent  6. Coronary artery disease involving native coronary artery of native heart without angina pectoris  -     Lipid Panel; Future    7. Atherosclerosis of both carotid arteries  -     Duplex Carotid Ultrasound CAR; Future    8. Mixed hyperlipidemia  -     Lipid Panel; Future    9. Primary osteoarthritis involving multiple joints    10. Lumbar spondylosis  Patient has tramadol to use as needed, but the last prescription was given over 2 years ago and she still has some, does not take this very often at all and does not need a refill currently  11. Gastroesophageal reflux disease without esophagitis    12. Hypokalemia  He will be a bit precarious replacing her low potassium in light of her acute kidney injury, so close follow-up on BMP is in order  13. Other fatigue  -     Folate; Future    14. Anemia, unspecified type    15. Vitamin D deficiency  -     Vitamin D,25-Hydroxy; Future    16. Acute kidney injury  -     POC Urinalysis Dipstick; Future      18. Acute cystitis without hematuria  -     Urine Culture - , Urine, Random Void; Future  Patient is not having acute UTI symptoms but acute cystitis needs to be ruled out as a cause of her acute kidney injury.  Patients her age often have acute UTI without knowing it, and she does have a long history of interstitial cystitis so the symptoms could be unnoticed by her, so urine culture will be sent  -     simvastatin (ZOCOR) 20 MG tablet; Take 1 tablet by mouth Every Night.  Dispense: 90 tablet; Refill: 3    Other orders  -     amLODIPine (NORVASC) 10 MG tablet; Take 1 tablet by mouth Daily.  Dispense: 90 tablet; Refill: 3  -     esomeprazole (nexIUM) 40 MG capsule; Take 1  capsule by mouth Every Morning Before Breakfast.  Dispense: 90 capsule; Refill: 3  -     losartan (Cozaar) 100 MG tablet; Take 1 tablet by mouth Daily.  Dispense: 90 tablet; Refill: 0  -     pentosan polysulfate (ELMIRON) 100 MG capsule; Take 1 capsule by mouth 1 (One) Time for 1 dose. One po qhs  Dispense: 90 capsule; Refill: 3            Follow Up   Return in about 2 months (around 8/6/2025) for Annual physical, Nurse - AWV Subsequent.  Patient was given instructions and counseling regarding her condition or for health maintenance advice. Please see specific information pulled into the AVS if appropriate.     Thong Landin MD

## 2025-06-20 ENCOUNTER — LAB (OUTPATIENT)
Dept: FAMILY MEDICINE CLINIC | Facility: CLINIC | Age: OVER 89
End: 2025-06-20
Payer: MEDICARE

## 2025-06-20 ENCOUNTER — TELEPHONE (OUTPATIENT)
Dept: FAMILY MEDICINE CLINIC | Facility: CLINIC | Age: OVER 89
End: 2025-06-20
Payer: MEDICARE

## 2025-06-20 DIAGNOSIS — M15.0 PRIMARY OSTEOARTHRITIS INVOLVING MULTIPLE JOINTS: Primary | ICD-10-CM

## 2025-06-20 DIAGNOSIS — M47.816 LUMBAR SPONDYLOSIS: ICD-10-CM

## 2025-06-20 RX ORDER — TRAMADOL HYDROCHLORIDE 50 MG/1
50 TABLET ORAL EVERY 8 HOURS PRN
Qty: 60 TABLET | Refills: 1 | Status: SHIPPED | OUTPATIENT
Start: 2025-06-20

## 2025-06-20 NOTE — TELEPHONE ENCOUNTER
Ms. Harris stopped by today and would like for Dr. Landin to send in some Tramadol 50 mg tablets because she is having 3 teeth pulled on June 24th.   She would like an amount of 60 tablets- taking 1 to 2 tablets every 8 hours as needed for pain.  The patient wrote all of this information down on a paper and handed it to me stating she would like for Dr. Landin to send this in for her.   She would like this to be sent to [facility].

## 2025-06-21 DIAGNOSIS — E87.5 HYPERKALEMIA: Primary | ICD-10-CM

## 2025-06-23 ENCOUNTER — TELEPHONE (OUTPATIENT)
Dept: FAMILY MEDICINE CLINIC | Facility: CLINIC | Age: OVER 89
End: 2025-06-23
Payer: MEDICARE

## 2025-06-25 DIAGNOSIS — I65.23 ATHEROSCLEROSIS OF BOTH CAROTID ARTERIES: ICD-10-CM

## 2025-07-01 ENCOUNTER — TELEPHONE (OUTPATIENT)
Dept: FAMILY MEDICINE CLINIC | Facility: CLINIC | Age: OVER 89
End: 2025-07-01
Payer: MEDICARE

## 2025-07-01 NOTE — TELEPHONE ENCOUNTER
Pt called wanting to know what the letter said that was sent to her. Read the letter to the patient. She now is requesting an appt  to speak to Dr. Landin. Place patient on hold and transferred her to the  to schedule.

## 2025-07-08 ENCOUNTER — OFFICE VISIT (OUTPATIENT)
Dept: FAMILY MEDICINE CLINIC | Facility: CLINIC | Age: OVER 89
End: 2025-07-08
Payer: MEDICARE

## 2025-07-08 VITALS
SYSTOLIC BLOOD PRESSURE: 110 MMHG | WEIGHT: 104.5 LBS | BODY MASS INDEX: 19.23 KG/M2 | DIASTOLIC BLOOD PRESSURE: 60 MMHG | OXYGEN SATURATION: 99 % | HEIGHT: 62 IN

## 2025-07-08 DIAGNOSIS — Z79.899 ENCOUNTER FOR LONG-TERM (CURRENT) USE OF MEDICATIONS: ICD-10-CM

## 2025-07-08 DIAGNOSIS — D50.0 IRON DEFICIENCY ANEMIA DUE TO CHRONIC BLOOD LOSS: ICD-10-CM

## 2025-07-08 DIAGNOSIS — I87.2 VENOUS INSUFFICIENCY (CHRONIC) (PERIPHERAL): ICD-10-CM

## 2025-07-08 DIAGNOSIS — E11.22 TYPE 2 DIABETES MELLITUS WITH STAGE 3B CHRONIC KIDNEY DISEASE, WITHOUT LONG-TERM CURRENT USE OF INSULIN: ICD-10-CM

## 2025-07-08 DIAGNOSIS — C78.7 SECONDARY MALIGNANCY OF LIVER: ICD-10-CM

## 2025-07-08 DIAGNOSIS — I10 ESSENTIAL HYPERTENSION, BENIGN: Primary | ICD-10-CM

## 2025-07-08 DIAGNOSIS — N18.32 TYPE 2 DIABETES MELLITUS WITH STAGE 3B CHRONIC KIDNEY DISEASE, WITHOUT LONG-TERM CURRENT USE OF INSULIN: ICD-10-CM

## 2025-07-08 RX ORDER — AMLODIPINE BESYLATE 5 MG/1
5 TABLET ORAL DAILY
Qty: 90 TABLET | Refills: 3 | Status: SHIPPED | OUTPATIENT
Start: 2025-07-08

## 2025-07-08 RX ORDER — TRAVOPROST OPHTHALMIC SOLUTION 0.04 MG/ML
1 SOLUTION OPHTHALMIC NIGHTLY
COMMUNITY

## 2025-07-08 RX ORDER — FUROSEMIDE 20 MG/1
20 TABLET ORAL DAILY PRN
Qty: 90 TABLET | Refills: 1 | Status: SHIPPED | OUTPATIENT
Start: 2025-07-08

## 2025-07-08 RX ORDER — PENTOSAN POLYSULFATE SODIUM 100 MG/1
100 CAPSULE, GELATIN COATED ORAL NIGHTLY
COMMUNITY

## 2025-07-12 ENCOUNTER — TELEPHONE (OUTPATIENT)
Dept: FAMILY MEDICINE CLINIC | Facility: CLINIC | Age: OVER 89
End: 2025-07-12
Payer: MEDICARE

## 2025-07-12 PROBLEM — I87.2 VENOUS INSUFFICIENCY (CHRONIC) (PERIPHERAL): Status: ACTIVE | Noted: 2025-07-12

## 2025-07-13 NOTE — PROGRESS NOTES
Chief Complaint  Hypertension, Neck Pain (Started last week ), and lucio feet and ankle swelling  (Would like to discuss Furosmide 20mg one daily  )    Subjective      Cristiane Harris presents to [facility]  Hypertension  Associated symptoms: neck pain    Associated symptoms: no blurred vision, no chest pain, no palpitations, no shortness of breath and no dizziness    Neck Pain   Pertinent negatives include no chest pain, fever, numbness, trouble swallowing, weakness or weight loss.     Patient is here for follow-up on acute renal failure and hypertension.  She recently had repeat labs and the results were reviewed with her, and thankfully her GFR has come up quite a bit.  She says her blood pressure is staying under good control.  She did see her cardiologist recently, and complained about a little bit of leg swelling, and he told her that she could use Lasix 20 mg daily as needed, but hopefully she would only need this occasionally and not need it very regularly.  The patient says that she would be more comfortable getting all of her prescriptions in my name, although I did explain that taking hydrochlorothiazide daily contributed to her recent drop in kidney function, and that the leg swelling should be controlled by other means is much as possible, including elevating the feet higher than the level of the heart for 30 minutes twice a day, compression socks or stockings, and salt avoidance.  As long as she only takes the Lasix occasionally and not regularly, the risk could be minimal, but if she finds that she is needing it any more than once or twice a week then she should have labs repeated in 4 weeks.  We also discussed the fact that amlodipine often causes leg swelling at the 10 mg dose, and since her blood pressure has been staying in the 110-120 over 60s, I think it would be wise to try cutting the dose down to 5 mg daily, as this may eliminate the leg swelling, and she  "will simply need to monitor her blood pressure.    She says she did wake up with a \"crick in her neck\" recently, but it has begun to improve.  She does not recall any injury, and has not had any numbness weakness or paresthesias in her arms or legs.    The patient's anemia had worsened a bit with recent labs, but she says she cannot get in for her EGD until about 4 weeks from now.  She has been advised that if she has any obvious bleeding then she should go to the emergency department.  Likewise, if she does seem to have a precipitous drop in her blood pressure resulting in syncope or presyncope for no obvious reason, she should seek attention immediately as she should assume that she has occult GI bleeding until proven otherwise.  Objective   Vital Signs:   Vitals:    07/08/25 1435   BP: 110/60   BP Location: Left arm   Patient Position: Sitting   Cuff Size: Adult   SpO2: 99%   Weight: 47.4 kg (104 lb 8 oz)   Height: 157.5 cm (62\")      /60 (BP Location: Left arm, Patient Position: Sitting, Cuff Size: Adult)   Ht 157.5 cm (62\")   Wt 47.4 kg (104 lb 8 oz)   SpO2 99%   BMI 19.11 kg/m²     Body mass index is 19.11 kg/m².    Review of Systems   Constitutional:  Positive for fatigue. Negative for chills, fever and unexpected weight loss.   HENT:  Negative for ear discharge, ear pain, mouth sores, nosebleeds, rhinorrhea, sinus pressure, sore throat, swollen glands, trouble swallowing and voice change.    Eyes:  Negative for blurred vision, double vision, pain, redness and visual disturbance.   Respiratory:  Negative for cough, choking, chest tightness, shortness of breath and wheezing.    Cardiovascular:  Positive for leg swelling. Negative for chest pain and palpitations.        PND, orthopnea   Gastrointestinal:  Negative for abdominal distention, abdominal pain, blood in stool, constipation, diarrhea, nausea, vomiting and GERD.        Dysphagia, odynophagia   Endocrine: Negative for polydipsia, polyphagia " and polyuria.   Genitourinary:  Negative for dysuria and hematuria.   Musculoskeletal:  Positive for neck pain. Negative for arthralgias (unusual/atypica), gait problem, joint swelling and myalgias.   Skin:  Negative for rash, skin lesions (worrisome/suspicious) and bruise.   Allergic/Immunologic: Negative for food allergies.   Neurological:  Negative for dizziness, tremors, seizures, syncope, weakness, numbness and headache.   Hematological:  Negative for adenopathy. Does not bruise/bleed easily.   Psychiatric/Behavioral:  Negative for suicidal ideas and depressed mood. The patient is not nervous/anxious.        Past History:  Medical History: has a past medical history of Anemia, Arthritis, GERD (gastroesophageal reflux disease), GIST (gastrointestinal stromal tumor), non-malignant, Hyperlipemia, Hypertension, Metastatic disease (2023), Skin cancer, and Stenosis of carotid artery.   Surgical History: has a past surgical history that includes Thromboendarterectomy (Right, 11/20/2012); Other surgical history; Carotid Endarterectomy (Left); Breast surgery (2011); Tumor removal; Whipple Procedure; Cataract extraction w/ intraocular lens implant; Joint replacement; Total knee arthroplasty (Right); and Esophagogastroduodenoscopy (N/A, 7/3/2018).   Family History: family history includes Aneurysm in her father; Cancer in her mother and sister; Coronary artery disease in her father; Diabetes in her father.   Social History: reports that she has never smoked. She has never been exposed to tobacco smoke. She has never used smokeless tobacco. She reports that she does not drink alcohol and does not use drugs.      Current Outpatient Medications:     aspirin 81 MG tablet, Take 1 tablet by mouth Daily., Disp: , Rfl:     diphenoxylate-atropine (Lomotil) 2.5-0.025 MG per tablet, 1-2 pills po qid prn diarrhea, Disp: 1 tablet, Rfl: 0    dorzolamide (TRUSOPT) 2 % ophthalmic solution, Administer 1 drop to both eyes 2 (Two) Times a  Day., Disp: , Rfl:     Elmiron 100 MG capsule, Take 1 capsule by mouth Every Night., Disp: , Rfl:     esomeprazole (nexIUM) 40 MG capsule, Take 1 capsule by mouth Every Morning Before Breakfast., Disp: 90 capsule, Rfl: 3    losartan (Cozaar) 100 MG tablet, Take 1 tablet by mouth Daily., Disp: 90 tablet, Rfl: 0    Multiple Vitamins-Minerals (MULTIVITAMIN PO), Take 1 tablet by mouth Daily., Disp: , Rfl:     nitroglycerin (Nitrostat) 0.4 MG SL tablet, Place one SL prn chest pain, may repeat q 5 minutes PRN up to 2 times, then call 911, Disp: 25 tablet, Rfl: 2    simvastatin (ZOCOR) 20 MG tablet, Take 1 tablet by mouth Every Night., Disp: 90 tablet, Rfl: 3    traMADol (ULTRAM) 50 MG tablet, Take 1 tablet by mouth Every 8 (Eight) Hours As Needed for Moderate Pain or Severe Pain., Disp: 60 tablet, Rfl: 1    travoprost, BAK free, (TRAVATAN) 0.004 % solution ophthalmic solution, Administer 1 drop to both eyes Every Night., Disp: , Rfl:     amLODIPine (NORVASC) 5 MG tablet, Take 1 tablet by mouth Daily., Disp: 90 tablet, Rfl: 3    furosemide (Lasix) 20 MG tablet, Take 1 tablet by mouth Daily As Needed (swelling/fluid retention)., Disp: 90 tablet, Rfl: 1    Allergies: Acetaminophen    Physical Exam  Constitutional:       General: She is not in acute distress.     Appearance: She is not toxic-appearing.   HENT:      Head: Normocephalic and atraumatic.      Right Ear: Ear canal and external ear normal.      Left Ear: Ear canal and external ear normal.      Nose: Nose normal.      Mouth/Throat:      Mouth: Mucous membranes are moist.      Pharynx: Oropharynx is clear.   Eyes:      General: No scleral icterus.     Extraocular Movements: Extraocular movements intact.      Conjunctiva/sclera: Conjunctivae normal.      Pupils: Pupils are equal, round, and reactive to light.   Neck:      Vascular: No carotid bruit.   Cardiovascular:      Rate and Rhythm: Normal rate and regular rhythm.      Pulses: Normal pulses.      Heart sounds:  Normal heart sounds.   Pulmonary:      Effort: Pulmonary effort is normal.      Breath sounds: Normal breath sounds.   Chest:      Chest wall: No tenderness.   Abdominal:      General: Bowel sounds are normal. There is no distension.      Palpations: Abdomen is soft.      Tenderness: There is no abdominal tenderness. There is no guarding or rebound.   Musculoskeletal:         General: No swelling or deformity. Normal range of motion.      Cervical back: Normal range of motion. No rigidity.      Right lower leg: Edema (Trace) present.      Left lower leg: Edema (Trace) present.   Lymphadenopathy:      Cervical: No cervical adenopathy.   Skin:     General: Skin is warm and dry.      Capillary Refill: Capillary refill takes less than 2 seconds.      Coloration: Skin is pale.      Findings: No erythema or rash.   Neurological:      General: No focal deficit present.      Mental Status: She is alert and oriented to person, place, and time.      Cranial Nerves: No cranial nerve deficit.      Motor: No weakness.      Coordination: Coordination normal.      Gait: Gait normal.   Psychiatric:         Mood and Affect: Mood normal.         Behavior: Behavior normal.         Thought Content: Thought content normal.         Judgment: Judgment normal.                   Assessment and Plan   Diagnoses and all orders for this visit:    1. Essential hypertension, benign (Primary)  See details in HPI, will try lowering amlodipine to 5 mg daily to illuminate leg swelling.  If blood pressure goes back up and she may resume the 10 mg dose and just use the Lasix as needed  2. Type 2 diabetes mellitus with stage 3b chronic kidney disease, without long-term current use of insulin  Well-controlled without medicines.  Her GFR has been in stage IIIb, but recent test actually had a GFR at 47-53, although that is actually better than her baseline  3. Encounter for long-term (current) use of medications    4. Venous insufficiency (chronic)  (peripheral)    5. Secondary malignancy of liver  Managed by oncology  6. Iron deficiency anemia due to chronic blood loss  Last hemoglobin 8.1, scheduled for EGD in a few weeks, must go to emergency room immediately for any signs of bleeding or precipitous drop in blood pressure  Other orders  -     amLODIPine (NORVASC) 5 MG tablet; Take 1 tablet by mouth Daily.  Dispense: 90 tablet; Refill: 3  -     furosemide (Lasix) 20 MG tablet; Take 1 tablet by mouth Daily As Needed (swelling/fluid retention).  Dispense: 90 tablet; Refill: 1            Follow Up   Return if symptoms worsen or fail to improve.  Patient was given instructions and counseling regarding her condition or for health maintenance advice. Please see specific information pulled into the AVS if appropriate.     Thong Landin MD

## (undated) DEVICE — CUFF,BP,DISP,1 TUBE,ADULT,HP: Brand: MEDLINE

## (undated) DEVICE — ENDOGATOR AUXILIARY WATER JET CONNECTOR: Brand: ENDOGATOR

## (undated) DEVICE — THE CHANNEL CLEANING BRUSH IS A NYLON FLEXI BRUSH ATTACHED TO A FLEXIBLE PLASTIC SHEATH DESIGNED TO SAFELY REMOVE DEBRIS FROM FLEXIBLE ENDOSCOPES.

## (undated) DEVICE — SENSR O2 OXIMAX FNGR A/ 18IN NONSTR

## (undated) DEVICE — Device: Brand: DEFENDO AIR/WATER/SUCTION AND BIOPSY VALVE

## (undated) DEVICE — DEV CLIP ENDO RESOLUTION360 CONTRL ROT 235CM: Type: IMPLANTABLE DEVICE | Site: STOMACH | Status: NON-FUNCTIONAL

## (undated) DEVICE — FRCP BX RADJAW4 NDL 2.8 240 STD OG

## (undated) DEVICE — TBG SMPL FLTR LINE NASL 02/C02 A/ BX/100

## (undated) DEVICE — CONMED SCOPE SAVER BITE BLOCK, 20X27 MM: Brand: SCOPE SAVER